# Patient Record
Sex: MALE | Race: WHITE | NOT HISPANIC OR LATINO | URBAN - METROPOLITAN AREA
[De-identification: names, ages, dates, MRNs, and addresses within clinical notes are randomized per-mention and may not be internally consistent; named-entity substitution may affect disease eponyms.]

---

## 2017-12-11 ENCOUNTER — EMERGENCY (EMERGENCY)
Facility: HOSPITAL | Age: 82
LOS: 0 days | Discharge: HOME | End: 2017-12-11
Admitting: INTERNAL MEDICINE

## 2017-12-11 DIAGNOSIS — I10 ESSENTIAL (PRIMARY) HYPERTENSION: ICD-10-CM

## 2017-12-11 DIAGNOSIS — I48.91 UNSPECIFIED ATRIAL FIBRILLATION: ICD-10-CM

## 2017-12-11 DIAGNOSIS — L03.90 CELLULITIS, UNSPECIFIED: ICD-10-CM

## 2017-12-11 DIAGNOSIS — F79 UNSPECIFIED INTELLECTUAL DISABILITIES: ICD-10-CM

## 2017-12-11 DIAGNOSIS — E03.9 HYPOTHYROIDISM, UNSPECIFIED: ICD-10-CM

## 2017-12-14 ENCOUNTER — EMERGENCY (EMERGENCY)
Facility: HOSPITAL | Age: 82
LOS: 0 days | Discharge: HOME | End: 2017-12-14
Admitting: INTERNAL MEDICINE

## 2017-12-14 DIAGNOSIS — F79 UNSPECIFIED INTELLECTUAL DISABILITIES: ICD-10-CM
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
## 2017-12-14 DIAGNOSIS — Z79.899 OTHER LONG TERM (CURRENT) DRUG THERAPY: ICD-10-CM
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
## 2017-12-14 DIAGNOSIS — I10 ESSENTIAL (PRIMARY) HYPERTENSION: ICD-10-CM

## 2017-12-14 DIAGNOSIS — Z79.01 LONG TERM (CURRENT) USE OF ANTICOAGULANTS: ICD-10-CM

## 2017-12-14 DIAGNOSIS — E03.9 HYPOTHYROIDISM, UNSPECIFIED: ICD-10-CM

## 2017-12-14 DIAGNOSIS — S62.184A: ICD-10-CM

## 2017-12-14 DIAGNOSIS — I48.91 UNSPECIFIED ATRIAL FIBRILLATION: ICD-10-CM

## 2017-12-14 DIAGNOSIS — L03.90 CELLULITIS, UNSPECIFIED: ICD-10-CM

## 2017-12-14 DIAGNOSIS — M25.531 PAIN IN RIGHT WRIST: ICD-10-CM
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
## 2017-12-14 DIAGNOSIS — Z88.5 ALLERGY STATUS TO NARCOTIC AGENT: ICD-10-CM

## 2017-12-29 PROBLEM — Z00.00 ENCOUNTER FOR PREVENTIVE HEALTH EXAMINATION: Status: ACTIVE | Noted: 2017-12-29

## 2019-12-19 ENCOUNTER — INPATIENT (INPATIENT)
Facility: HOSPITAL | Age: 84
LOS: 11 days | Discharge: NURSING, REHAB OR LONG-TERM CARE | End: 2019-12-31
Attending: HOSPITALIST | Admitting: HOSPITALIST
Payer: MEDICARE

## 2019-12-19 VITALS — HEART RATE: 33 BPM | TEMPERATURE: 98 F | OXYGEN SATURATION: 96 %

## 2019-12-19 LAB
ALBUMIN SERPL ELPH-MCNC: 4.2 G/DL — SIGNIFICANT CHANGE UP (ref 3.5–5.2)
ALP SERPL-CCNC: 102 U/L — SIGNIFICANT CHANGE UP (ref 30–115)
ALT FLD-CCNC: 27 U/L — SIGNIFICANT CHANGE UP (ref 0–41)
ANION GAP SERPL CALC-SCNC: 16 MMOL/L — HIGH (ref 7–14)
APTT BLD: 28.2 SEC — SIGNIFICANT CHANGE UP (ref 27–39.2)
AST SERPL-CCNC: 24 U/L — SIGNIFICANT CHANGE UP (ref 0–41)
BASOPHILS # BLD AUTO: 0.05 K/UL — SIGNIFICANT CHANGE UP (ref 0–0.2)
BASOPHILS NFR BLD AUTO: 0.5 % — SIGNIFICANT CHANGE UP (ref 0–1)
BILIRUB SERPL-MCNC: 0.6 MG/DL — SIGNIFICANT CHANGE UP (ref 0.2–1.2)
BLD GP AB SCN SERPL QL: SIGNIFICANT CHANGE UP
BUN SERPL-MCNC: 22 MG/DL — HIGH (ref 10–20)
CALCIUM SERPL-MCNC: 8.6 MG/DL — SIGNIFICANT CHANGE UP (ref 8.5–10.1)
CHLORIDE SERPL-SCNC: 104 MMOL/L — SIGNIFICANT CHANGE UP (ref 98–110)
CK MB CFR SERPL CALC: 5.8 NG/ML — SIGNIFICANT CHANGE UP (ref 0.6–6.3)
CK SERPL-CCNC: 151 U/L — SIGNIFICANT CHANGE UP (ref 0–225)
CK SERPL-CCNC: 158 U/L — SIGNIFICANT CHANGE UP (ref 0–225)
CO2 SERPL-SCNC: 22 MMOL/L — SIGNIFICANT CHANGE UP (ref 17–32)
CREAT SERPL-MCNC: 1.1 MG/DL — SIGNIFICANT CHANGE UP (ref 0.7–1.5)
EOSINOPHIL # BLD AUTO: 0.37 K/UL — SIGNIFICANT CHANGE UP (ref 0–0.7)
EOSINOPHIL NFR BLD AUTO: 3.7 % — SIGNIFICANT CHANGE UP (ref 0–8)
ETHANOL SERPL-MCNC: <10 MG/DL — SIGNIFICANT CHANGE UP
GLUCOSE BLDC GLUCOMTR-MCNC: 108 MG/DL — HIGH (ref 70–99)
GLUCOSE SERPL-MCNC: 151 MG/DL — HIGH (ref 70–99)
HCT VFR BLD CALC: 39.5 % — LOW (ref 42–52)
HGB BLD-MCNC: 12.9 G/DL — LOW (ref 14–18)
IMM GRANULOCYTES NFR BLD AUTO: 0.6 % — HIGH (ref 0.1–0.3)
INR BLD: 1.23 RATIO — SIGNIFICANT CHANGE UP (ref 0.65–1.3)
LACTATE SERPL-SCNC: 3.2 MMOL/L — HIGH (ref 0.7–2)
LIDOCAIN IGE QN: 16 U/L — SIGNIFICANT CHANGE UP (ref 7–60)
LYMPHOCYTES # BLD AUTO: 3.18 K/UL — SIGNIFICANT CHANGE UP (ref 1.2–3.4)
LYMPHOCYTES # BLD AUTO: 31.8 % — SIGNIFICANT CHANGE UP (ref 20.5–51.1)
MAGNESIUM SERPL-MCNC: 1.9 MG/DL — SIGNIFICANT CHANGE UP (ref 1.8–2.4)
MCHC RBC-ENTMCNC: 28.1 PG — SIGNIFICANT CHANGE UP (ref 27–31)
MCHC RBC-ENTMCNC: 32.7 G/DL — SIGNIFICANT CHANGE UP (ref 32–37)
MCV RBC AUTO: 86.1 FL — SIGNIFICANT CHANGE UP (ref 80–94)
MONOCYTES # BLD AUTO: 0.98 K/UL — HIGH (ref 0.1–0.6)
MONOCYTES NFR BLD AUTO: 9.8 % — HIGH (ref 1.7–9.3)
NEUTROPHILS # BLD AUTO: 5.35 K/UL — SIGNIFICANT CHANGE UP (ref 1.4–6.5)
NEUTROPHILS NFR BLD AUTO: 53.6 % — SIGNIFICANT CHANGE UP (ref 42.2–75.2)
NRBC # BLD: 0 /100 WBCS — SIGNIFICANT CHANGE UP (ref 0–0)
PLATELET # BLD AUTO: 214 K/UL — SIGNIFICANT CHANGE UP (ref 130–400)
POTASSIUM SERPL-MCNC: 3.6 MMOL/L — SIGNIFICANT CHANGE UP (ref 3.5–5)
POTASSIUM SERPL-SCNC: 3.6 MMOL/L — SIGNIFICANT CHANGE UP (ref 3.5–5)
PROT SERPL-MCNC: 6.9 G/DL — SIGNIFICANT CHANGE UP (ref 6–8)
PROTHROM AB SERPL-ACNC: 14.1 SEC — HIGH (ref 9.95–12.87)
RBC # BLD: 4.59 M/UL — LOW (ref 4.7–6.1)
RBC # FLD: 13.9 % — SIGNIFICANT CHANGE UP (ref 11.5–14.5)
SODIUM SERPL-SCNC: 142 MMOL/L — SIGNIFICANT CHANGE UP (ref 135–146)
TROPONIN T SERPL-MCNC: 0.02 NG/ML — HIGH
TROPONIN T SERPL-MCNC: 0.03 NG/ML — CRITICAL HIGH
WBC # BLD: 9.99 K/UL — SIGNIFICANT CHANGE UP (ref 4.8–10.8)
WBC # FLD AUTO: 9.99 K/UL — SIGNIFICANT CHANGE UP (ref 4.8–10.8)

## 2019-12-19 PROCEDURE — 12013 RPR F/E/E/N/L/M 2.6-5.0 CM: CPT | Mod: 59

## 2019-12-19 PROCEDURE — 93010 ELECTROCARDIOGRAM REPORT: CPT

## 2019-12-19 PROCEDURE — 70486 CT MAXILLOFACIAL W/O DYE: CPT | Mod: 26

## 2019-12-19 PROCEDURE — 72125 CT NECK SPINE W/O DYE: CPT | Mod: 26

## 2019-12-19 PROCEDURE — 71045 X-RAY EXAM CHEST 1 VIEW: CPT | Mod: 26

## 2019-12-19 PROCEDURE — 99291 CRITICAL CARE FIRST HOUR: CPT | Mod: 25

## 2019-12-19 PROCEDURE — 74177 CT ABD & PELVIS W/CONTRAST: CPT | Mod: 26

## 2019-12-19 PROCEDURE — 99221 1ST HOSP IP/OBS SF/LOW 40: CPT

## 2019-12-19 PROCEDURE — 72170 X-RAY EXAM OF PELVIS: CPT | Mod: 26

## 2019-12-19 PROCEDURE — 71260 CT THORAX DX C+: CPT | Mod: 26

## 2019-12-19 PROCEDURE — 36556 INSERT NON-TUNNEL CV CATH: CPT

## 2019-12-19 PROCEDURE — 99222 1ST HOSP IP/OBS MODERATE 55: CPT

## 2019-12-19 PROCEDURE — 70450 CT HEAD/BRAIN W/O DYE: CPT | Mod: 26

## 2019-12-19 RX ORDER — INFLUENZA VIRUS VACCINE 15; 15; 15; 15 UG/.5ML; UG/.5ML; UG/.5ML; UG/.5ML
0.5 SUSPENSION INTRAMUSCULAR ONCE
Refills: 0 | Status: DISCONTINUED | OUTPATIENT
Start: 2019-12-19 | End: 2019-12-31

## 2019-12-19 RX ORDER — PANTOPRAZOLE SODIUM 20 MG/1
40 TABLET, DELAYED RELEASE ORAL
Refills: 0 | Status: DISCONTINUED | OUTPATIENT
Start: 2019-12-19 | End: 2019-12-20

## 2019-12-19 RX ORDER — AMLODIPINE BESYLATE 2.5 MG/1
10 TABLET ORAL DAILY
Refills: 0 | Status: DISCONTINUED | OUTPATIENT
Start: 2019-12-19 | End: 2019-12-20

## 2019-12-19 RX ORDER — MORPHINE SULFATE 50 MG/1
4 CAPSULE, EXTENDED RELEASE ORAL ONCE
Refills: 0 | Status: DISCONTINUED | OUTPATIENT
Start: 2019-12-19 | End: 2019-12-19

## 2019-12-19 RX ORDER — CEFAZOLIN SODIUM 1 G
2000 VIAL (EA) INJECTION ONCE
Refills: 0 | Status: COMPLETED | OUTPATIENT
Start: 2019-12-19 | End: 2019-12-19

## 2019-12-19 RX ORDER — ACETAMINOPHEN 500 MG
650 TABLET ORAL EVERY 6 HOURS
Refills: 0 | Status: DISCONTINUED | OUTPATIENT
Start: 2019-12-19 | End: 2019-12-20

## 2019-12-19 RX ORDER — LEVOTHYROXINE SODIUM 125 MCG
25 TABLET ORAL DAILY
Refills: 0 | Status: DISCONTINUED | OUTPATIENT
Start: 2019-12-19 | End: 2019-12-20

## 2019-12-19 RX ORDER — CHLORHEXIDINE GLUCONATE 213 G/1000ML
1 SOLUTION TOPICAL
Refills: 0 | Status: DISCONTINUED | OUTPATIENT
Start: 2019-12-19 | End: 2019-12-20

## 2019-12-19 RX ADMIN — MORPHINE SULFATE 4 MILLIGRAM(S): 50 CAPSULE, EXTENDED RELEASE ORAL at 15:59

## 2019-12-19 RX ADMIN — Medication 100 MILLIGRAM(S): at 17:24

## 2019-12-19 NOTE — ED PROVIDER NOTE - PROGRESS NOTE DETAILS
BI: pt cleared from trauma perspective for CCU. Primary Dr. Menard. BI: Per cardio fellow, Dr. Frank, who was at bedside initially, admit to CCU and have EP see pt for PPM. Place EP consult. Signed out to [facility] ICU resident.

## 2019-12-19 NOTE — ED PROCEDURE NOTE - NS ED PROCEDURE ASSISTED BY
The procedure was performed independently
Supervision was available

## 2019-12-19 NOTE — H&P ADULT - NSHPPHYSICALEXAM_GEN_ALL_CORE
Vital Signs Last 24 Hrs  T(C): 36.4 (19 Dec 2019 13:46), Max: 36.4 (19 Dec 2019 13:46)  T(F): 97.6 (19 Dec 2019 13:46), Max: 97.6 (19 Dec 2019 13:46)  HR: 59 (19 Dec 2019 15:55) (26 - 100)  BP: 160/68 (19 Dec 2019 15:55) (120/60 - 169/84)  BP(mean): --  RR: 20 (19 Dec 2019 15:55) (18 - 20)  SpO2: 97% (19 Dec 2019 15:55) (96% - 100%)    POCT Blood Glucose.: 128 mg/dL (19 Dec 2019 14:10)    Physical Exam:    -     General :     -      HEENT:    -      Cardiac:    -      Pulm:    -      GI:    -      Musculoskeletal:    -      Neuro: Vital Signs Last 24 Hrs  T(C): 36.4 (19 Dec 2019 13:46), Max: 36.4 (19 Dec 2019 13:46)  T(F): 97.6 (19 Dec 2019 13:46), Max: 97.6 (19 Dec 2019 13:46)  HR: 59 (19 Dec 2019 15:55) (26 - 100)  BP: 160/68 (19 Dec 2019 15:55) (120/60 - 169/84)  BP(mean): --  RR: 20 (19 Dec 2019 15:55) (18 - 20)  SpO2: 97% (19 Dec 2019 15:55) (96% - 100%)    POCT Blood Glucose.: 128 mg/dL (19 Dec 2019 14:10)    Physical Exam:    -     General : alert, awake and in no acute distress    -      HEENT: depression over nasal bridge    -      Cardiac: RRR    -      Pulm: CTA B/L    -      GI: abdomen soft, non-tender    -      Musculoskeletal: no lower extremity edema    -      Neuro: no focal deficits

## 2019-12-19 NOTE — CONSULT NOTE ADULT - ASSESSMENT
83 YO M with PMH of HTN, hypothyroidism, h/o DVT, ?Paroxysmal A.Fib on Eliquis, hypercoagulable state, intellectual disability, brought in by EMS after an episode of syncope.    Intermittent Complete heart block  Paroxysmal A.Fib/Hypercoagulable state    -s/p transvenous PM placement in the ER  -currently back in NSR with intermittent RV pacing  -avoid all AV rajat blocking drugs  -obtain 2d echo, hold Eliquis  -NPO past midnight for possible PPM implantation tomorrow  -monitor in CCU  -will discuss with Attending 83 YO M with PMH of HTN, hypothyroidism, h/o DVT, ?Paroxysmal A.Fib on Eliquis, hypercoagulable state, intellectual disability, brought in by EMS after an episode of syncope.    Intermittent Complete heart block  Paroxysmal A.Fib/Hypercoagulable state    -s/p transvenous PM placement in the ER  -currently back in NSR with intermittent RV pacing  -avoid all AV rajat blocking drugs  -obtain 2d echo, hold Eliquis   PPM DDD scheduled for today  -monitor in CCU till  pt gets PPM  then transfer to telemetry  -

## 2019-12-19 NOTE — H&P ADULT - ASSESSMENT
A 84-year-old male with a PMH of HTN, DVT/atrial fibrillation? (on Eliquis), hypothyroidism, and intellectual disability who presented after a syncopal event today.    1. Complete heart block  - s/p TVP  - cardiology and EP consults pending  - check TSH and repeat cardiac enzymes  - keep NPO after midnight for possible procedure in AM    2. HTN  - continue Amlodipine    3. Hypothyroidism  - TSH from 9/2019 was 3  - check repeat TSH level  - continue Synthroid 25 mcg daily    4. History of DVT/A-Fib?  - unclear why patient is on Eliquis; please f/u with patient's outpatient PMD for further history  - will hold Eliquis for now in anticipation of possible PPM procedure    5. Right 7th rib fracture  - pain control as tolerated  - encourage use of incentive spirometry    6. Nasal bone fracture  - pain control as tolerated    7. Disposition  - admit to CCU A 84-year-old male with a PMH of HTN, DVT/atrial fibrillation? (on Eliquis), hypothyroidism, and intellectual disability who presented after a syncopal event today.    1. Complete heart block  - s/p TVP  - cardiology and EP consults pending  - check TSH and repeat cardiac enzymes  - case discussed with cardiology fellow; keep NPO after midnight for possible procedure in AM    2. HTN  - continue Amlodipine 10 mg daily    3. Hypothyroidism  - TSH from 9/2019 was 3  - check repeat TSH level  - continue Synthroid 25 mcg daily    4. History of DVT/A-Fib?  - unclear why patient is on Eliquis; please f/u with patient's outpatient PMD for further history  - will hold Eliquis for now in anticipation of possible PPM procedure    5. Right 7th rib fracture  - pain control as tolerated  - encourage use of incentive spirometry    6. Nasal bone fracture  - pain control as tolerated    7. DVT prophylaxis  - bilateral SCDs for now    8. Disposition  - admit to CCU

## 2019-12-19 NOTE — ED PROVIDER NOTE - CLINICAL SUMMARY MEDICAL DECISION MAKING FREE TEXT BOX
72yo M presents after syncope, found to be in 3rd degree heart block. S/p TVP placement in ED. Traumatic workup reveals nasal bone fx, overlying laceration, covered with ABx. Will require EP eval, PPM placement. Case d/w cardiology fellow, accepted to CCU.

## 2019-12-19 NOTE — ED PROVIDER NOTE - CRITICAL CARE PROVIDED
direct patient care (not related to procedure)/interpretation of diagnostic studies/consult w/ pt's family directly relating to pts condition/documentation/consultation with other physicians/additional history taking

## 2019-12-19 NOTE — ED ADULT NURSE NOTE - OBJECTIVE STATEMENT
Pt BIBA for witnessed syncopal episode at a [facility]. Pt has lac to bridge of nose and abrasion to right knee. Trauma alert called. Pt HR 33 upon arrival. Transvenous pacer in progress.

## 2019-12-19 NOTE — H&P ADULT - HISTORY OF PRESENT ILLNESS
The patient is a 84-year-old male The patient is a 84-year-old male with a PMH of HTN, DVT/atrial fibrillation? (on Eliquis), hypothyroidism, and intellectual disability who presented after a syncopal event today.  The patient is a poor historian so further history was obtained from his sister at bedside.  The patient was reportedly walking to the store when he lost consciousness.  He is unable to provide further details about his syncopal event.    In the ED, the patient was found to be bradycardic and with complete heart block on ECG.  A transvenous pacemaker was placed and his HR subsequently improved.

## 2019-12-19 NOTE — CONSULT NOTE ADULT - SUBJECTIVE AND OBJECTIVE BOX
TRAUMA ACTIVATION LEVEL:  TRAUMA ALERT    MECHANISM OF INJURY:      [] Blunt  	[] MVC	[X] Fall	[] Pedestrian Struck	[] Motorcycle   [] Assault   [] Bicycle collision  [] Sports injury     [] Penetrating  	[] Gun Shot Wound 		[] Stab Wound    GCS: 15 	E: 4	V: 5	M: 6    HPI:  71M, unknown PMHx, presents to ED s/p fall. Pt states he fainted, and fell face forward. Pt states he is on Eliquis. Pt presents w/ nasal bridge laceration, and dried blood on b/l hands. Otherwise, no HA, no CP, no SOB, no abdominal pain, no musculoskeletal pain.     PAST MEDICAL & SURGICAL HISTORY:      Allergies    No Known Allergies    Intolerances        Home Medications:  Eliquis     ROS: 10-system review is otherwise negative except HPI above.      Primary Survey:    A - airway intact  B - bilateral breath sounds and good chest rise  C - palpable pulses in all extremities  D - GCS 15 on arrival, BUCIO  Exposure obtained    Vital Signs Last 24 Hrs  T(C): 36.4 (19 Dec 2019 13:46), Max: 36.4 (19 Dec 2019 13:46)  T(F): 97.6 (19 Dec 2019 13:46), Max: 97.6 (19 Dec 2019 13:46)  HR: 26 (19 Dec 2019 14:22) (26 - 33)  BP: 120/60 (19 Dec 2019 14:12) (120/60 - 120/60)  BP(mean): --  RR: 20 (19 Dec 2019 14:22) (20 - 20)  SpO2: 100% (19 Dec 2019 14:22) (96% - 100%)    Secondary Survey:   General: NAD  HEENT: Nasal bridge laceration. EOMI, PEERLA. no scalp lacerations   Neck: Soft, midline trachea. no cspine tenderness  Chest: No chest wall tenderness. or subq  emphysema   Cardiac: S1, S2, RRR  Respiratory: Bilateral breath sounds, clear and equal bilaterally  Abdomen: Soft, non-distended, non-tender, no rebound,   Groin: Normal appearing, pelvis stable   Ext: palp radial b/l UE, b/l DP palp in Lower Extrem.   Back: no TTP, no palpable runoff/stepoff/deformity      FAST    Procedures:    LABS:  Labs:  CAPILLARY BLOOD GLUCOSE      POCT Blood Glucose.: 128 mg/dL (19 Dec 2019 14:10)              LFTs:         Coags:          RADIOLOGY & ADDITIONAL STUDIES:    PENDING     --------------------------------------------------------------------------------------- TRAUMA ACTIVATION LEVEL:  TRAUMA ALERT    MECHANISM OF INJURY:      [] Blunt  	[] MVC	[X] Fall	[] Pedestrian Struck	[] Motorcycle   [] Assault   [] Bicycle collision  [] Sports injury     [] Penetrating  	[] Gun Shot Wound 		[] Stab Wound    GCS: 15 	E: 4	V: 5	M: 6    HPI:  71M, unknown PMHx, presents to ED s/p fall on eliquis. Pt states he fainted, and fell face forward. Pt states he is on Eliquis. Pt presents w/ nasal bridge laceration, and dried blood on b/l hands. Otherwise, no HA, no CP, no SOB, no abdominal pain, no musculoskeletal pain. While in ED patient was profoundly bradycardic - pacer wires placed by ED at bedside    PAST MEDICAL & SURGICAL HISTORY:  Unknown - states "ask my sister when she arrives"    Allergies    No Known Allergies    Intolerances        Home Medications:  Eliquis     ROS: 10-system review is otherwise negative except HPI above.      Primary Survey:    A - airway intact  B - bilateral breath sounds and good chest rise  C - palpable pulses in all extremities  D - GCS 15 on arrival, BUCIO  Exposure obtained    Vital Signs Last 24 Hrs  T(C): 36.4 (19 Dec 2019 13:46), Max: 36.4 (19 Dec 2019 13:46)  T(F): 97.6 (19 Dec 2019 13:46), Max: 97.6 (19 Dec 2019 13:46)  HR: 26 (19 Dec 2019 14:22) (26 - 33)  BP: 120/60 (19 Dec 2019 14:12) (120/60 - 120/60)  BP(mean): --  RR: 20 (19 Dec 2019 14:22) (20 - 20)  SpO2: 100% (19 Dec 2019 14:22) (96% - 100%)    Secondary Survey:   General: NAD  HEENT: Nasal bridge laceration. EOMI, PEERLA. no scalp lacerations   Neck: Soft, midline trachea. no cspine tenderness  Chest: No chest wall tenderness. or subq  emphysema   Cardiac: S1, S2, RRR  Respiratory: Bilateral breath sounds, clear and equal bilaterally  Abdomen: Soft, non-distended, non-tender, no rebound,   Groin: Normal appearing, pelvis stable   Ext: palp radial b/l UE, b/l DP palp in Lower Extrem.   Back: no TTP, no palpable runoff/stepoff/deformity      FAST    Procedures:    LABS:             12.9   9.99  )-----------( 214      ( 12-19 @ 14:32 )             39.5                    142   |  104   |  22                 Ca: 8.6    BMP:   ----------------------------< 151    Mg: x     (12-19-19 @ 14:32)             3.6    |  22    | 1.1                Ph: x        LFT:     TPro: 6.9 / Alb: 4.2 / TBili: 0.6 / DBili: x / AST: 24 / ALT: 27 / AlkPhos: 102   (12-19-19 @ 14:32)          PT/INR - ( 19 Dec 2019 14:32 )   PT: 14.10 sec;   INR: 1.23 ratio         PTT - ( 19 Dec 2019 14:32 )  PTT:28.2 sec    CARDIAC MARKERS ( 19 Dec 2019 14:32 )  x     / 0.02 ng/mL / 158 U/L / x     / x          Radiology:  < from: CT Head No Cont (12.19.19 @ 15:40) >  Fracture of the nasal bones.    No evidence of acute maxillo-facial, orbital or mandibular fracture.    Parenchymal atrophy compatible with the patient's age  No evidence of acute intracranial hemorrhage or mass effect.    < end of copied text >      < from: CT Maxillofacial No Cont (12.19.19 @ 15:46) >  Fracture of the nasal bones.    No evidence of acute maxillo-facial, orbital or mandibular fracture.    Parenchymal atrophy compatible with the patient's age  No evidence of acute intracranial hemorrhage or mass effect.      < end of copied text >      < from: CT Cervical Spine No Cont (12.19.19 @ 15:44) >  Degenerative changes as described above. No evidence of fracture.    < end of copied text >      < from: CT Chest w/ IV Cont (12.19.19 @ 15:54) >    Minimally displaced fracture of the anterior right seventh rib, age indeterminate.    No evidence of solid injury in the thorax, abdomen or pelvis.    Right lower lobe atelectasis. However, underlying nodule cannot be excluded. Recommend follow-up CT in 3 months to document resolution.    < end of copied text >

## 2019-12-19 NOTE — CONSULT NOTE ADULT - ASSESSMENT
83 YO M with PMH of HTN, hypothyroidism, h/o DVT, ?Paroxysmal A.Fib on Eliquis, hypercoagulable state, intellectual disability, brought in by EMS after an episode of syncope.    Intermittent Complete heart block  Paroxysmal A.Fib/Hypercoagulable state    -s/p transvenous PM placement in the ER  -currently back in NSR with intermittent RV pacing  -avoid all AV rajat blocking drugs  -obtain 2d echo, hold Eliquis  -NPO past midnight for possible PPM implantation tomorrow  -monitor in CCU  -will discuss with Attending 83 YO M with PMH of HTN, hypothyroidism, h/o DVT, ?Paroxysmal A.Fib on Eliquis, hypercoagulable state, intellectual disability, brought in by EMS after an episode of syncope.    Intermittent Complete heart block  Paroxysmal A.Fib/Hypercoagulable state    -s/p transvenous PM placement in the ER  -currently back in NSR with intermittent RV pacing  -avoid all AV rajat blocking drugs, check tsh  -obtain 2d echo, hold Eliquis  -NPO past midnight for possible PPM implantation tomorrow  -monitor in CCU  -will discuss with Attending

## 2019-12-19 NOTE — CONSULT NOTE ADULT - ASSESSMENT
ASSESSMENT/PLAN:   71M, s/p syncope, +HT, +LOC, +Eliquis, nasal bridge laceration.   - Full PAN scan w/ Max/fac   - full set of labs   - reassess ASSESSMENT/PLAN:   71M, s/p syncope, +HT, +LOC, +Eliquis, nasal bridge laceration.       - Full PAN scan w/ Max/fac - nasal bone fracture and age indeterminate rib fx (no corresponding chest wall tenderness   - full set of labs with elevation in lacate and mild elevation in trops   - At this time medical issues outweigh burden of traumatic injury (bradycardia requiring emergent pacing vs nasal bone fracture), agree with ED plan for admission to medical service for workup    D/w Dr. Fitzgerald

## 2019-12-19 NOTE — ED PROCEDURE NOTE - CPROC ED INFORMED CONSENT1
This was an emergent procedure and consent was implied.
This was an emergent procedure and consent was implied.
Benefits, risks, and possible complications of procedure explained to patient/caregiver who verbalized understanding and gave verbal consent.

## 2019-12-19 NOTE — ED PROVIDER NOTE - OBJECTIVE STATEMENT
71 y.o M w/ unknown PMHx syncopized and fell in front of a deli, found down by pedestrians. Pt was initially altered but can now tell us that he felt dizzy prior to falling. Pt unable to convey any other history.

## 2019-12-19 NOTE — H&P ADULT - ATTENDING COMMENTS
I saw and examined the patient independently. I agree with above history, physical exam and plan of care which I have reviewed and edited where appropriate with following additions.    patient seen in CCU after PPM placement/ reports doing ok/ denies being in pain. asking for nurse help with bedpan for BM.     syncope episode due to complete heart block sp PPM placement:   patient had TVP placed in ED yesterday.   sp PPM placement today  continue telemonitoring in CCU.   TTE noted: normal EF with grade 1 diastolic dysfunction.     HTN:   BP on higher side  avoiding rajat blocking agents.  c/w norvasc.    h/o A-FIB / ?DVT:   on eliquis/ can restart if no further procedures planned .     nasal bone fractures:  denies pain for now  c/w tylenol prn.   ENT eval within 7 days once stable     dvt ppx has SCD's now.

## 2019-12-19 NOTE — ED PROVIDER NOTE - ATTENDING CONTRIBUTION TO CARE
70yo M with PMHx HTN, hypothyroid, on Eliquis for unknown reason, BIBEMS after fall at deli, found down by pedestrians. Pt states lightheaded prior to falling. EMS HR in 20s. Pt is denying pain. Unable to provide any other history/ROS.    Vital Signs: I have reviewed the initial vital signs.  Constitutional: NAD  HEAD: No signs of basilar skull fracture.  Integumentary: Laceration to bridge of nose.   EYES: No periorbial swelling/ecchymoses. PERRL, EOM intact.   ENT: MMM. No septal hematoma. No mastoid ecchymoses.  NECK: Supple, non-tender, no spinous tenderness to neck. No palpable shelves or step-offs.  BACK: No spinous tenderness. No palpable shelves or step-offs.  Cardiovascular: Bradycardic. Radial pulses and pedal pulses 2+ and symmetric. No pain to palpation to chest wall.  Respiratory: BS present b/l, ctabl, no wheezing or crackles, good air exchange, good resp effort and excursion, no accessory muscle use, no stridor.  Gastrointestinal: Soft, nd, nt no rebound tenderness or guarding, no cvat.  Musculoskeletal: No hip pain to palpation. No short leg. No internal or external rotation of LE.  Neurologic: AAOx2. GCS 15. Speech clear and coherent. Answering questions appropriately. Moving all extremities. No gross FND.

## 2019-12-19 NOTE — ED PROVIDER NOTE - PHYSICAL EXAMINATION
CONSTITUTIONAL: in NAD, GCS 15  SKIN: Warm dry, normal skin turgor, lac to bridge of nose.  HEAD: NC  EYES: EOMI, PERRLA, no scleral icterus, conjunctiva pink  ENT: no epistaxis or blood in the oropharynx  NECK: Supple; non tender. Full ROM. trachea midline.  CARD: leticia, no murmurs.  THORAX: no chest wall tenderness.  RESP: b/l breath sounds CTABL  ABD: soft, non-tender, non-distended, no rebound or guarding.  EXT: Full ROM, no bony tenderness, no pedal edema, no calf tenderness, stable pelvis, no spinal back tenderness. peripheral pulses intact and symmetrical.  NEURO: moving all extremities equally.   PSYCH: Cooperative, appropriate. A&O x2.

## 2019-12-19 NOTE — CONSULT NOTE ADULT - SUBJECTIVE AND OBJECTIVE BOX
Date of Admission: 12/19    CHIEF COMPLAINT: syncope    HISTORY OF PRESENT ILLNESS:   83 YO M with PMH of HTN, hypothyroidism, h/o DVT, ?Paroxysmal A.Fib on Eliquis, hypercoagulable state, intellectual disability, brought in by EMS after an episode of syncope. History was provided by the niece as well, who was at the bedside. Pt. as per the family was walking back from the grocery store when he syncopized.     EKG upon arrival in the ER showed complete heart block, with ventricular escape rhythm. TVP was placed by ER. Of note pt. had one prior episode of syncope about 1 year ago.    Pt. denies any chest pain, sob, palpitations, orthopnea, pnd.    PAST MEDICAL & SURGICAL HISTORY:  Hypothyroidism  Hypertension  A.Fib  No significant past surgical history      FAMILY HISTORY:  [x] no pertinent family history of premature cardiovascular disease in first degree relatives.  Mother:   Father:   Siblings:     SOCIAL HISTORY:    [x] Non-smoker  [ ] Smoker  [-] Alcohol    Allergies    No Known Allergies    Intolerances    	    REVIEW OF SYSTEMS:  CONSTITUTIONAL: No fever, weight loss, or fatigue  CARDIOLOGY: denies chest pain, shortness of breath   RESPIRATORY: denies shortness of breath, wheezing   NEUROLOGICAL: No weakness, no focal deficits to report.  ENDOCRINOLOGICAL: no recent change in diabetic medications.   GI: no BRBPR, no N,V,diarrhea.    PSYCHIATRY: normal mood and affect  HEENT: no nasal discharge, no ecchymosis  SKIN: no ecchymosis, no breakdown  MUSCULOSKELETAL: Full range of motion x4.     PHYSICAL EXAM:  T(C): 36.4 (12-19-19 @ 18:10), Max: 36.4 (12-19-19 @ 13:46)  HR: 74 (12-19-19 @ 18:10) (26 - 100)  BP: 141/73 (12-19-19 @ 18:10) (120/60 - 169/84)  RR: 16 (12-19-19 @ 18:10) (16 - 20)  SpO2: 97% (12-19-19 @ 18:10) (96% - 100%)  Wt(kg): --  I&O's Summary      General Appearance: well appearing, normal for age and gender. 	  Neck: normal JVP, no bruit.   Eyes: No xanthomalasia, Extra ocular muscles intact.   Cardiovascular: regular rate and rhythm S1 S2, No JVD, No murmurs, No edema  Respiratory: Lungs clear to auscultation	  Psychiatry: Alert and oriented x 3, Mood & affect appropriate  Gastrointestinal:  Soft, Non-tender  Skin/Integument: No rashes, No ecchymoses, No cyanosis. Laceration over the bridge of the nose  Neurologic: Non-focal  Musculoskeletal/ extremities: Normal range of motion, No clubbing, cyanosis or edema  Vascular: Peripheral pulses palpable 2+ bilaterally    LABS:	 	                          12.9   9.99  )-----------( 214      ( 19 Dec 2019 14:32 )             39.5     12-19    142  |  104  |  22<H>  ----------------------------<  151<H>  3.6   |  22  |  1.1    Ca    8.6      19 Dec 2019 14:32    TPro  6.9  /  Alb  4.2  /  TBili  0.6  /  DBili  x   /  AST  24  /  ALT  27  /  AlkPhos  102  12-19    CARDIAC MARKERS ( 19 Dec 2019 14:32 )  x     / 0.02 ng/mL / 158 U/L / x     / x          PT/INR - ( 19 Dec 2019 14:32 )   PT: 14.10 sec;   INR: 1.23 ratio         PTT - ( 19 Dec 2019 14:32 )  PTT:28.2 sec      TELEMETRY EVENTS: 	    none    ECG:  	  complete heart block with ventricular escape rhythm    RADIOLOGY:  CXR: no radiographic evidence of acute cardiopulmonary disease      PREVIOUS DIAGNOSTIC TESTING:    [ ] Echocardiogram:    [ ]  Catheterization:    [ ] Stress Test:  	  	    Home Medications:  amLODIPine 10 mg oral tablet: 1 tab(s) orally once a day (19 Dec 2019 18:44)  Eliquis:  (19 Dec 2019 18:44)  levothyroxine 25 mcg (0.025 mg) oral tablet: 1 tab(s) orally once a day (19 Dec 2019 18:44)    MEDICATIONS  (STANDING):  amLODIPine   Tablet 10 milliGRAM(s) Oral daily  chlorhexidine 4% Liquid 1 Application(s) Topical <User Schedule>  levothyroxine 25 MICROGram(s) Oral daily  pantoprazole    Tablet 40 milliGRAM(s) Oral before breakfast    MEDICATIONS  (PRN):  acetaminophen   Tablet .. 650 milliGRAM(s) Oral every 6 hours PRN Mild Pain (1 - 3)

## 2019-12-19 NOTE — H&P ADULT - NSHPLABSRESULTS_GEN_ALL_CORE
Labs:                        12.9   9.99  )-----------( 214      ( 19 Dec 2019 14:32 )             39.5             12-19    142  |  104  |  22<H>  ----------------------------<  151<H>  3.6   |  22  |  1.1    Ca    8.6      19 Dec 2019 14:32    TPro  6.9  /  Alb  4.2  /  TBili  0.6  /  DBili  x   /  AST  24  /  ALT  27  /  AlkPhos  102  12-19    LIVER FUNCTIONS - ( 19 Dec 2019 14:32 )  Alb: 4.2 g/dL / Pro: 6.9 g/dL / ALK PHOS: 102 U/L / ALT: 27 U/L / AST: 24 U/L / GGT: x         PT/INR - ( 19 Dec 2019 14:32 )   PT: 14.10 sec;   INR: 1.23 ratio     PTT - ( 19 Dec 2019 14:32 )  PTT:28.2 sec  CARDIAC MARKERS ( 19 Dec 2019 14:32 )  x     / 0.02 ng/mL / 158 U/L / x     / x Labs:                        12.9   9.99  )-----------( 214      ( 19 Dec 2019 14:32 )             39.5             12-19    142  |  104  |  22<H>  ----------------------------<  151<H>  3.6   |  22  |  1.1    Ca    8.6      19 Dec 2019 14:32    TPro  6.9  /  Alb  4.2  /  TBili  0.6  /  DBili  x   /  AST  24  /  ALT  27  /  AlkPhos  102  12-19    LIVER FUNCTIONS - ( 19 Dec 2019 14:32 )  Alb: 4.2 g/dL / Pro: 6.9 g/dL / ALK PHOS: 102 U/L / ALT: 27 U/L / AST: 24 U/L / GGT: x         PT/INR - ( 19 Dec 2019 14:32 )   PT: 14.10 sec;   INR: 1.23 ratio     PTT - ( 19 Dec 2019 14:32 )  PTT:28.2 sec  CARDIAC MARKERS ( 19 Dec 2019 14:32 )  x     / 0.02 ng/mL / 158 U/L / x     / x    < from: 12 Lead ECG (12.19.19 @ 13:51) >  Diagnosis Line Sinus rhythm with complete heart block  Non-specific intra-ventricular conduction delay  Abnormal ECG  < end of copied text >    < from: CT Head No Cont (12.19.19 @ 15:40) >  IMPRESSION:  Fracture of the nasal bones.  No evidence of acute maxillo-facial, orbital or mandibular fracture.  Parenchymal atrophy compatible with the patient's age  No evidence of acute intracranial hemorrhage or mass effect.  < end of copied text >

## 2019-12-19 NOTE — ED PROVIDER NOTE - CARE PLAN
Principal Discharge DX:	Complete heart block Principal Discharge DX:	Complete heart block  Secondary Diagnosis:	Nasal fracture  Secondary Diagnosis:	Fall

## 2019-12-19 NOTE — H&P ADULT - NSICDXPASTMEDICALHX_GEN_ALL_CORE_FT
PAST MEDICAL HISTORY:  No pertinent past medical history PAST MEDICAL HISTORY:  Hypertension     Hypothyroidism

## 2019-12-19 NOTE — ED PROCEDURE NOTE - CPROC ED TRANSVE PACE DETAIL1
The vein was accessed using an introducer. A pacing catheter was advanced.  The positive and negative electrodes were connected and demand pacing was initiated.  The rate and milliamp output were set.

## 2019-12-20 LAB
ALBUMIN SERPL ELPH-MCNC: 3.8 G/DL — SIGNIFICANT CHANGE UP (ref 3.5–5.2)
ALP SERPL-CCNC: 94 U/L — SIGNIFICANT CHANGE UP (ref 30–115)
ALT FLD-CCNC: 29 U/L — SIGNIFICANT CHANGE UP (ref 0–41)
ANION GAP SERPL CALC-SCNC: 16 MMOL/L — HIGH (ref 7–14)
AST SERPL-CCNC: 28 U/L — SIGNIFICANT CHANGE UP (ref 0–41)
BASOPHILS # BLD AUTO: 0.03 K/UL — SIGNIFICANT CHANGE UP (ref 0–0.2)
BASOPHILS NFR BLD AUTO: 0.4 % — SIGNIFICANT CHANGE UP (ref 0–1)
BILIRUB SERPL-MCNC: 0.7 MG/DL — SIGNIFICANT CHANGE UP (ref 0.2–1.2)
BUN SERPL-MCNC: 17 MG/DL — SIGNIFICANT CHANGE UP (ref 10–20)
CALCIUM SERPL-MCNC: 8.7 MG/DL — SIGNIFICANT CHANGE UP (ref 8.5–10.1)
CHLORIDE SERPL-SCNC: 103 MMOL/L — SIGNIFICANT CHANGE UP (ref 98–110)
CO2 SERPL-SCNC: 21 MMOL/L — SIGNIFICANT CHANGE UP (ref 17–32)
CREAT SERPL-MCNC: 0.8 MG/DL — SIGNIFICANT CHANGE UP (ref 0.7–1.5)
EOSINOPHIL # BLD AUTO: 0.26 K/UL — SIGNIFICANT CHANGE UP (ref 0–0.7)
EOSINOPHIL NFR BLD AUTO: 3.4 % — SIGNIFICANT CHANGE UP (ref 0–8)
GLUCOSE SERPL-MCNC: 111 MG/DL — HIGH (ref 70–99)
HCT VFR BLD CALC: 36.9 % — LOW (ref 42–52)
HGB BLD-MCNC: 12.1 G/DL — LOW (ref 14–18)
IMM GRANULOCYTES NFR BLD AUTO: 0.7 % — HIGH (ref 0.1–0.3)
LACTATE SERPL-SCNC: 1 MMOL/L — SIGNIFICANT CHANGE UP (ref 0.7–2)
LYMPHOCYTES # BLD AUTO: 1.45 K/UL — SIGNIFICANT CHANGE UP (ref 1.2–3.4)
LYMPHOCYTES # BLD AUTO: 19.1 % — LOW (ref 20.5–51.1)
MAGNESIUM SERPL-MCNC: 1.8 MG/DL — SIGNIFICANT CHANGE UP (ref 1.8–2.4)
MCHC RBC-ENTMCNC: 28 PG — SIGNIFICANT CHANGE UP (ref 27–31)
MCHC RBC-ENTMCNC: 32.8 G/DL — SIGNIFICANT CHANGE UP (ref 32–37)
MCV RBC AUTO: 85.4 FL — SIGNIFICANT CHANGE UP (ref 80–94)
MONOCYTES # BLD AUTO: 0.77 K/UL — HIGH (ref 0.1–0.6)
MONOCYTES NFR BLD AUTO: 10.1 % — HIGH (ref 1.7–9.3)
NEUTROPHILS # BLD AUTO: 5.04 K/UL — SIGNIFICANT CHANGE UP (ref 1.4–6.5)
NEUTROPHILS NFR BLD AUTO: 66.3 % — SIGNIFICANT CHANGE UP (ref 42.2–75.2)
NRBC # BLD: 0 /100 WBCS — SIGNIFICANT CHANGE UP (ref 0–0)
PLATELET # BLD AUTO: 168 K/UL — SIGNIFICANT CHANGE UP (ref 130–400)
POTASSIUM SERPL-MCNC: 4.2 MMOL/L — SIGNIFICANT CHANGE UP (ref 3.5–5)
POTASSIUM SERPL-SCNC: 4.2 MMOL/L — SIGNIFICANT CHANGE UP (ref 3.5–5)
PROT SERPL-MCNC: 6.6 G/DL — SIGNIFICANT CHANGE UP (ref 6–8)
RBC # BLD: 4.32 M/UL — LOW (ref 4.7–6.1)
RBC # FLD: 13.8 % — SIGNIFICANT CHANGE UP (ref 11.5–14.5)
SODIUM SERPL-SCNC: 140 MMOL/L — SIGNIFICANT CHANGE UP (ref 135–146)
TSH SERPL-MCNC: 2.4 UIU/ML — SIGNIFICANT CHANGE UP (ref 0.27–4.2)
WBC # BLD: 7.6 K/UL — SIGNIFICANT CHANGE UP (ref 4.8–10.8)
WBC # FLD AUTO: 7.6 K/UL — SIGNIFICANT CHANGE UP (ref 4.8–10.8)

## 2019-12-20 PROCEDURE — 93306 TTE W/DOPPLER COMPLETE: CPT | Mod: 26

## 2019-12-20 PROCEDURE — 71045 X-RAY EXAM CHEST 1 VIEW: CPT | Mod: 26

## 2019-12-20 PROCEDURE — 93289 INTERROG DEVICE EVAL HEART: CPT | Mod: 26

## 2019-12-20 PROCEDURE — 93010 ELECTROCARDIOGRAM REPORT: CPT

## 2019-12-20 PROCEDURE — 99222 1ST HOSP IP/OBS MODERATE 55: CPT

## 2019-12-20 PROCEDURE — 99232 SBSQ HOSP IP/OBS MODERATE 35: CPT | Mod: 25

## 2019-12-20 PROCEDURE — 99233 SBSQ HOSP IP/OBS HIGH 50: CPT

## 2019-12-20 PROCEDURE — 99223 1ST HOSP IP/OBS HIGH 75: CPT

## 2019-12-20 PROCEDURE — 33208 INSRT HEART PM ATRIAL & VENT: CPT | Mod: KX

## 2019-12-20 RX ORDER — HEPARIN SODIUM 5000 [USP'U]/ML
5000 INJECTION INTRAVENOUS; SUBCUTANEOUS EVERY 8 HOURS
Refills: 0 | Status: DISCONTINUED | OUTPATIENT
Start: 2019-12-21 | End: 2019-12-22

## 2019-12-20 RX ORDER — PANTOPRAZOLE SODIUM 20 MG/1
40 TABLET, DELAYED RELEASE ORAL
Refills: 0 | Status: DISCONTINUED | OUTPATIENT
Start: 2019-12-20 | End: 2019-12-31

## 2019-12-20 RX ORDER — CHLORHEXIDINE GLUCONATE 213 G/1000ML
1 SOLUTION TOPICAL
Refills: 0 | Status: DISCONTINUED | OUTPATIENT
Start: 2019-12-20 | End: 2019-12-31

## 2019-12-20 RX ORDER — CEFAZOLIN SODIUM 1 G
1000 VIAL (EA) INJECTION EVERY 8 HOURS
Refills: 0 | Status: COMPLETED | OUTPATIENT
Start: 2019-12-20 | End: 2019-12-21

## 2019-12-20 RX ORDER — AMLODIPINE BESYLATE 2.5 MG/1
10 TABLET ORAL DAILY
Refills: 0 | Status: DISCONTINUED | OUTPATIENT
Start: 2019-12-20 | End: 2019-12-31

## 2019-12-20 RX ORDER — ACETAMINOPHEN 500 MG
650 TABLET ORAL EVERY 6 HOURS
Refills: 0 | Status: DISCONTINUED | OUTPATIENT
Start: 2019-12-20 | End: 2019-12-29

## 2019-12-20 RX ORDER — LEVOTHYROXINE SODIUM 125 MCG
25 TABLET ORAL DAILY
Refills: 0 | Status: DISCONTINUED | OUTPATIENT
Start: 2019-12-20 | End: 2019-12-31

## 2019-12-20 RX ADMIN — Medication 25 MICROGRAM(S): at 05:15

## 2019-12-20 RX ADMIN — AMLODIPINE BESYLATE 10 MILLIGRAM(S): 2.5 TABLET ORAL at 15:34

## 2019-12-20 RX ADMIN — Medication 100 MILLIGRAM(S): at 17:05

## 2019-12-20 RX ADMIN — PANTOPRAZOLE SODIUM 40 MILLIGRAM(S): 20 TABLET, DELAYED RELEASE ORAL at 15:34

## 2019-12-20 RX ADMIN — AMLODIPINE BESYLATE 10 MILLIGRAM(S): 2.5 TABLET ORAL at 05:15

## 2019-12-20 RX ADMIN — Medication 25 MICROGRAM(S): at 15:34

## 2019-12-20 RX ADMIN — CHLORHEXIDINE GLUCONATE 1 APPLICATION(S): 213 SOLUTION TOPICAL at 05:15

## 2019-12-20 RX ADMIN — PANTOPRAZOLE SODIUM 40 MILLIGRAM(S): 20 TABLET, DELAYED RELEASE ORAL at 05:15

## 2019-12-20 NOTE — PRE-ANESTHESIA EVALUATION ADULT - NSANTHPMHFT_GEN_ALL_CORE
84 M with PMH of HTN (on amlodipine), paroxysmal Afib (on eliquis), hypercoagulable state (history of DVT), hypothyroidism (on levothyroxine), and intellectual disability, is brought to ED after a syncopal episode.  EKG showed complete heart block, and plan is for pacemaker placement.

## 2019-12-20 NOTE — CONSULT NOTE ADULT - SUBJECTIVE AND OBJECTIVE BOX
Surgeon: /Faizan/ Sally    Consult requesting by: Walter    HISTORY OF PRESENT ILLNESS:  83 YO M with PMH of HTN, hypothyroidism, h/o DVT, ?Paroxysmal A.Fib on Eliquis, hypercoagulable state, intellectual disability, brought in by EMS after an episode of syncope. History was provided by the niece as well, who was at the bedside. Pt. as per the family was walking back from the grocery store when he syncopized.     EKG upon arrival in the ER showed complete heart block, with ventricular escape rhythm. TVP was placed by ER. Of note pt. had one prior episode of syncope about 1 year ago. patient seen by electrophysiologist Dr. Watson who recommeds PPM for symptomatic intermittent heart block. CTS consulted for implant    PAST MEDICAL & SURGICAL HISTORY:  Hypothyroidism  Hypertension  Hypercoagulable state  atrial fibrillation  DVT  No significant past surgical history      MEDICATIONS  (STANDING):  amLODIPine   Tablet 10 milliGRAM(s) Oral daily  chlorhexidine 4% Liquid 1 Application(s) Topical <User Schedule>  influenza   Vaccine 0.5 milliLiter(s) IntraMuscular once  levothyroxine 25 MICROGram(s) Oral daily  pantoprazole    Tablet 40 milliGRAM(s) Oral before breakfast    MEDICATIONS  (PRN):  acetaminophen   Tablet .. 650 milliGRAM(s) Oral every 6 hours PRN Mild Pain (1 - 3)    Antiplatelet therapy:       stopped                    Last dose/amt:    Allergies    No Known Allergies    Intolerances        SOCIAL HISTORY:  no alcohol, drug or tobacco abuse  Occupation: retired  Lives with: family  Assisted device use: none    FAMILY HISTORY: non contributory       Review of Systems  CONSTITUTIONAL: No fever, weight loss, or fatigue  CARDIOLOGY: denies chest pain, shortness of breath   RESPIRATORY: denies shortness of breath, wheezing   NEUROLOGICAL: No weakness, no focal deficits to report.  ENDOCRINOLOGICAL: no recent change in diabetic medications.   GI: no BRBPR, no N,V,diarrhea.    PSYCHIATRY: normal mood and affect  HEENT: no nasal discharge, no ecchymosis  SKIN: no ecchymosis, no breakdown  MUSCULOSKELETAL: Full range of motion x4.                                                                                                                                                                                              PHYSICAL EXAM  Vital Signs Last 24 Hrs  T(C): 35.8 (20 Dec 2019 07:55), Max: 36.7 (19 Dec 2019 20:00)  T(F): 96.5 (20 Dec 2019 07:55), Max: 98 (19 Dec 2019 20:00)  HR: 62 (20 Dec 2019 10:00) (26 - 100)  BP: 121/66 (20 Dec 2019 10:00) (109/58 - 169/84)  BP(mean): 84 (20 Dec 2019 10:00) (74 - 112)  RR: 16 (20 Dec 2019 10:00) (16 - 23)  SpO2: 96% (20 Dec 2019 06:18) (95% - 100%)  Right arm bp: Left arm bp;    General-awake and alert in NAD  HEENT-PERRLA, EOMI, no nasal or ear drainage, oral mucosa moist no lesions, no JVD or goiter or nodes, good ROM  Chest-bilateral breath sounds, decreased bs at bases  Coronary- regular rate and rhythme, no significant murmur appreciated  Abd- soft nontender, nondistended  Extremities: no edema, full ROM,   Neuro: Alert and oriented, sensation and muscle tone intact all extremities                                                            LABS:                        12.1   7.60  )-----------( 168      ( 20 Dec 2019 04:07 )             36.9     12-20    140  |  103  |  17  ----------------------------<  111<H>  4.2   |  21  |  0.8    Ca    8.7      20 Dec 2019 04:07  Mg     1.8     12-20    TPro  6.6  /  Alb  3.8  /  TBili  0.7  /  DBili  x   /  AST  28  /  ALT  29  /  AlkPhos  94  12-20    PT/INR - ( 19 Dec 2019 14:32 )   PT: 14.10 sec;   INR: 1.23 ratio         PTT - ( 19 Dec 2019 14:32 )  PTT:28.2 sec    CARDIAC MARKERS ( 19 Dec 2019 21:09 )  x     / 0.03 ng/mL / 151 U/L / x     / 5.8 ng/mL  CARDIAC MARKERS ( 19 Dec 2019 14:32 )  x     / 0.02 ng/mL / 158 U/L / x     / x         12 Lead ECG (12.19.19 @ 13:51) >  Ventricular Rate 21 BPM    Atrial Rate 98 BPM    QRS Duration 170 ms    Q-T Interval 602 ms    QTC Calculation(Bezet) 355 ms    P Axis 75 degrees    R Axis 85 degrees    T Axis -72 degrees    Diagnosis Line Sinus rhythm with complete heart block  Non-specific intra-ventricular conduction delay          Assessment/ Plan:    83 yo male symptomatic for intermittent complete heart block will schedule for PPM today.  keep NPO Surgeon: /Faizan/ Sally    Consult requesting by: Walter    HISTORY OF PRESENT ILLNESS:  83 YO M with PMH of HTN, hypothyroidism, h/o DVT, ?Paroxysmal A.Fib on Eliquis, hypercoagulable state, intellectual disability, brought in by EMS after an episode of syncope. History was provided by the niece as well, who was at the bedside. Pt. as per the family was walking back from the grocery store when he syncopized.     EKG upon arrival in the ER showed complete heart block, with ventricular escape rhythm. TVP was placed by ER. Of note pt. had one prior episode of syncope about 1 year ago. patient seen by electrophysiologist Dr. Watson who recommeds PPM for symptomatic intermittent heart block. CTS consulted for implant    PAST MEDICAL & SURGICAL HISTORY:  Hypothyroidism  Hypertension  Hypercoagulable state  atrial fibrillation  DVT      MEDICATIONS  (STANDING):  amLODIPine   Tablet 10 milliGRAM(s) Oral daily  chlorhexidine 4% Liquid 1 Application(s) Topical <User Schedule>  influenza   Vaccine 0.5 milliLiter(s) IntraMuscular once  levothyroxine 25 MICROGram(s) Oral daily  pantoprazole    Tablet 40 milliGRAM(s) Oral before breakfast    MEDICATIONS  (PRN):  acetaminophen   Tablet .. 650 milliGRAM(s) Oral every 6 hours PRN Mild Pain (1 - 3)    Antiplatelet therapy:       stopped                    Last dose/amt:    Allergies    No Known Allergies    Intolerances        SOCIAL HISTORY:  no alcohol, drug or tobacco abuse  Occupation: retired  Lives with: family  Assisted device use: none    FAMILY HISTORY: non contributory       Review of Systems  CONSTITUTIONAL: No fever, weight loss, or fatigue  CARDIOLOGY: denies chest pain, shortness of breath   RESPIRATORY: denies shortness of breath, wheezing   NEUROLOGICAL: No weakness, no focal deficits to report.  ENDOCRINOLOGICAL: no recent change in diabetic medications.   GI: no BRBPR, no N,V,diarrhea.    PSYCHIATRY: normal mood and affect  HEENT: no nasal discharge, no ecchymosis  SKIN: no ecchymosis, no breakdown  MUSCULOSKELETAL: Full range of motion x4.                                                                                                                                                                                              PHYSICAL EXAM  Vital Signs Last 24 Hrs  T(C): 35.8 (20 Dec 2019 07:55), Max: 36.7 (19 Dec 2019 20:00)  T(F): 96.5 (20 Dec 2019 07:55), Max: 98 (19 Dec 2019 20:00)  HR: 62 (20 Dec 2019 10:00) (26 - 100)  BP: 121/66 (20 Dec 2019 10:00) (109/58 - 169/84)  BP(mean): 84 (20 Dec 2019 10:00) (74 - 112)  RR: 16 (20 Dec 2019 10:00) (16 - 23)  SpO2: 96% (20 Dec 2019 06:18) (95% - 100%)  Right arm bp: Left arm bp;    General-awake and alert in NAD  HEENT-PERRLA, EOMI, + laceration on bridge of nose with surrounding ecchymosis and tenderness, no JVD or goiter or nodes, good ROM  Chest-bilateral breath sounds, decreased bs at bases  Coronary- regular rate and rhythme, no significant murmur appreciated  Abd- soft nontender, nondistended, + lower midline surgical scar  Extremities: no edema, full ROM,   Neuro: Alert and oriented, sensation and muscle tone intact all extremities                                                          LABS:                        12.1   7.60  )-----------( 168      ( 20 Dec 2019 04:07 )             36.9     12-20    140  |  103  |  17  ----------------------------<  111<H>  4.2   |  21  |  0.8    Ca    8.7      20 Dec 2019 04:07  Mg     1.8     12-20    TPro  6.6  /  Alb  3.8  /  TBili  0.7  /  DBili  x   /  AST  28  /  ALT  29  /  AlkPhos  94  12-20    PT/INR - ( 19 Dec 2019 14:32 )   PT: 14.10 sec;   INR: 1.23 ratio         PTT - ( 19 Dec 2019 14:32 )  PTT:28.2 sec    CARDIAC MARKERS ( 19 Dec 2019 21:09 )  x     / 0.03 ng/mL / 151 U/L / x     / 5.8 ng/mL  CARDIAC MARKERS ( 19 Dec 2019 14:32 )  x     / 0.02 ng/mL / 158 U/L / x     / x         12 Lead ECG (12.19.19 @ 13:51) >  Ventricular Rate 21 BPM    Atrial Rate 98 BPM    QRS Duration 170 ms    Q-T Interval 602 ms    QTC Calculation(Bezet) 355 ms    P Axis 75 degrees    R Axis 85 degrees    T Axis -72 degrees    Diagnosis Line Sinus rhythm with complete heart block  Non-specific intra-ventricular conduction delay          Assessment/ Plan:    85 yo male symptomatic for intermittent complete heart block will schedule for PPM today.  keep NPO

## 2019-12-20 NOTE — CHART NOTE - NSCHARTNOTEFT_GEN_A_CORE
TERTIARY TRAUMA SURVEY  ------------------------------------------------------------------------------------------    Date/Time: 12-20-19 @ 11:15  Admit date:   Trauma activation:   Admit GCS:  	E-  4	V-  5	M-  6    HPI:  The patient is a 84-year-old male with a PMH of HTN, DVT/atrial fibrillation? (on Eliquis), hypothyroidism, and intellectual disability who presented after a syncopal event today.  The patient is a poor historian so further history was obtained from his sister at bedside.  The patient was reportedly walking to the store when he lost consciousness.  He is unable to provide further details about his syncopal event.    In the ED, the patient was found to be bradycardic and with complete heart block on ECG.  A transvenous pacemaker was placed and his HR subsequently improved. (19 Dec 2019 17:53)      INTERVAL EVENTS: ***    PAST MEDICAL & SURGICAL HISTORY:  Hypothyroidism  Hypertension  No significant past surgical history    FAMILY HISTORY:    SOCIAL HISTORY:     ALLERGIES: No Known Allergies      HOME MEDICATIONS:     CURRENT MEDICATIONS:   MEDICATIONS (STANDING): amLODIPine   Tablet 10 milliGRAM(s) Oral daily  influenza   Vaccine 0.5 milliLiter(s) IntraMuscular once  levothyroxine 25 MICROGram(s) Oral daily  pantoprazole    Tablet 40 milliGRAM(s) Oral before breakfast    MEDICATIONS (PRN):acetaminophen   Tablet .. 650 milliGRAM(s) Oral every 6 hours PRN Mild Pain (1 - 3)    ------------------------------------------------------------------------------------------    VITAL SIGNS  T(C): 35.8 (12-20-19 @ 11:01), Max: 36.7 (12-19-19 @ 20:00)  HR: 62 (12-20-19 @ 11:01) (26 - 100)  BP: 121/66 (12-20-19 @ 11:01) (109/58 - 169/84)  RR: 16 (12-20-19 @ 11:01) (16 - 23)  SpO2: 96% (12-20-19 @ 06:18) (95% - 100%)  CAPILLARY BLOOD GLUCOSE      POCT Blood Glucose.: 108 mg/dL (19 Dec 2019 22:17)  POCT Blood Glucose.: 128 mg/dL (19 Dec 2019 14:10)      INS/OUTS:    12-19 @ 07:01  -  12-20 @ 07:00  --------------------------------------------------------  IN:  Total IN: 0 mL    OUT:    Voided: 200 mL  Total OUT: 200 mL    Total NET: -200 mL      12-20 @ 07:01  -  12-20 @ 11:15  --------------------------------------------------------  IN:  Total IN: 0 mL    OUT:    Voided: 350 mL  Total OUT: 350 mL    Total NET: -350 mL        Drug Dosing Weight  Height (cm): 162.56 (20 Dec 2019 11:01)  Weight (kg): 76.4 (20 Dec 2019 11:01)  BMI (kg/m2): 28.9 (20 Dec 2019 11:01)  BSA (m2): 1.82 (20 Dec 2019 11:01)    PHYSICAL EXAM:    General: NAD  HEENT: Nasal bridge laceration. EOMI, PEERLA. no scalp lacerations   Neck: Soft, midline trachea. no cspine tenderness  Chest: No chest wall tenderness. or subq  emphysema   Cardiac: S1, S2, RRR  Respiratory: Bilateral breath sounds, clear and equal bilaterally  Abdomen: Soft, non-distended, non-tender, no rebound,   Groin: Normal appearing, pelvis stable   Ext: palp radial b/l UE, b/l DP palp in Lower Extrem.   Back: no TTP, no palpable runoff/stepoff/deformity    ------------------------------------------------------------------------------------------    LABS  CBC (12-20 @ 04:07)                              12.1<L>                         7.60    )----------------(  168        66.3  % Neutrophils, 19.1<L>% Lymphocytes, ANC: 5.04                                36.9<L>  CBC (12-19 @ 14:32)                              12.9<L>                         9.99    )----------------(  214        53.6  % Neutrophils, 31.8  % Lymphocytes, ANC: 5.35                                39.5<L>    BMP (12-20 @ 04:07)             140     |  103     |  17    		Ca++ --      Ca 8.7                ---------------------------------( 111<H>		Mg 1.8                4.2     |  21      |  0.8   			Ph --      BMP (12-19 @ 21:09)             --      |  --      |  --    		Ca++ --      Ca --                 ---------------------------------( --    		Mg 1.9                --      |  --      |  --    			Ph --        LFTs (12-20 @ 04:07)      TPro 6.6 / Alb 3.8 / TBili 0.7 / DBili -- / AST 28 / ALT 29 / AlkPhos 94  LFTs (12-19 @ 14:32)      TPro 6.9 / Alb 4.2 / TBili 0.6 / DBili -- / AST 24 / ALT 27 / AlkPhos 102    Coags (12-19 @ 14:32)  aPTT 28.2 / INR 1.23 / PT 14.10<H>    Cardiac Markers (12-19 @ 21:09)     HSTrop: -- / CKMB: -- / CK: 151  Cardiac Markers (12-19 @ 14:32)     HSTrop: -- / CKMB: -- / CK: 158    ABG (12-20 @ 04:07)      /  /  /  /  / %     Lactate:  1.0  ABG (12-19 @ 14:32)      /  /  /  /  / %     Lactate:  3.2<H>        MICROBIOLOGY      ------------------------------------------------------------------------------------------    RADIOLOGICAL FINDINGS REVIEW:  ***  < from: CT Head No Cont (12.19.19 @ 15:40) >  Fracture of the nasal bones.    No evidence of acute maxillo-facial, orbital or mandibular fracture.    Parenchymal atrophy compatible with the patient's age  No evidence of acute intracranial hemorrhage or mass effect.    < end of copied text >      < from: CT Maxillofacial No Cont (12.19.19 @ 15:46) >  Fracture of the nasal bones.    No evidence of acute maxillo-facial, orbital or mandibular fracture.    Parenchymal atrophy compatible with the patient's age  No evidence of acute intracranial hemorrhage or mass effect.      < end of copied text >      < from: CT Cervical Spine No Cont (12.19.19 @ 15:44) >  Degenerative changes as described above. No evidence of fracture.    < end of copied text >      < from: CT Chest w/ IV Cont (12.19.19 @ 15:54) >    Minimally displaced fracture of the anterior right seventh rib, age indeterminate.    No evidence of solid injury in the thorax, abdomen or pelvis.    Right lower lobe atelectasis. However, underlying nodule cannot be excluded. Recommend follow-up CT in 3 months to document resolution.    < end of copied text >      List Injuries Identified to Date:    Complete heart block      List Operative and Interventional Radiological Procedures:        Consults (Date):  ***  [] Neurosurgery   [] Orthopedic Surgery  [] Spine Surgery  [] Plastic Surgery  [] ENT  [] Urology  [] PM&R  [] Social Work  [x] Cardiology  [x] Electrophysiology TERTIARY TRAUMA SURVEY  ------------------------------------------------------------------------------------------    Date/Time: 12-20-19 @ 11:15  Admit date:   Trauma activation:   Admit GCS:  	E-  4	V-  5	M-  6    HPI:  The patient is a 84-year-old male with a PMH of HTN, DVT/atrial fibrillation? (on Eliquis), hypothyroidism, and intellectual disability who presented after a syncopal event today.  The patient is a poor historian so further history was obtained from his sister at bedside.  The patient was reportedly walking to the store when he lost consciousness.  He is unable to provide further details about his syncopal event.    In the ED, the patient was found to be bradycardic and with complete heart block on ECG.  A transvenous pacemaker was placed and his HR subsequently improved. (19 Dec 2019 17:53)      INTERVAL EVENTS: no injuries    PAST MEDICAL & SURGICAL HISTORY:  Hypothyroidism  Hypertension  No significant past surgical history    FAMILY HISTORY:    SOCIAL HISTORY:     ALLERGIES: No Known Allergies      HOME MEDICATIONS:     CURRENT MEDICATIONS:   MEDICATIONS (STANDING): amLODIPine   Tablet 10 milliGRAM(s) Oral daily  influenza   Vaccine 0.5 milliLiter(s) IntraMuscular once  levothyroxine 25 MICROGram(s) Oral daily  pantoprazole    Tablet 40 milliGRAM(s) Oral before breakfast    MEDICATIONS (PRN):acetaminophen   Tablet .. 650 milliGRAM(s) Oral every 6 hours PRN Mild Pain (1 - 3)    ------------------------------------------------------------------------------------------    VITAL SIGNS  T(C): 35.8 (12-20-19 @ 11:01), Max: 36.7 (12-19-19 @ 20:00)  HR: 62 (12-20-19 @ 11:01) (26 - 100)  BP: 121/66 (12-20-19 @ 11:01) (109/58 - 169/84)  RR: 16 (12-20-19 @ 11:01) (16 - 23)  SpO2: 96% (12-20-19 @ 06:18) (95% - 100%)  CAPILLARY BLOOD GLUCOSE      POCT Blood Glucose.: 108 mg/dL (19 Dec 2019 22:17)  POCT Blood Glucose.: 128 mg/dL (19 Dec 2019 14:10)      INS/OUTS:    12-19 @ 07:01  -  12-20 @ 07:00  --------------------------------------------------------  IN:  Total IN: 0 mL    OUT:    Voided: 200 mL  Total OUT: 200 mL    Total NET: -200 mL      12-20 @ 07:01  -  12-20 @ 11:15  --------------------------------------------------------  IN:  Total IN: 0 mL    OUT:    Voided: 350 mL  Total OUT: 350 mL    Total NET: -350 mL        Drug Dosing Weight  Height (cm): 162.56 (20 Dec 2019 11:01)  Weight (kg): 76.4 (20 Dec 2019 11:01)  BMI (kg/m2): 28.9 (20 Dec 2019 11:01)  BSA (m2): 1.82 (20 Dec 2019 11:01)    PHYSICAL EXAM:    General: NAD  HEENT: Nasal bridge laceration. EOMI, PEERLA. no scalp lacerations   Neck: Soft, midline trachea. no cspine tenderness  Chest: No chest wall tenderness. or subq  emphysema   Cardiac: S1, S2, RRR  Respiratory: Bilateral breath sounds, clear and equal bilaterally  Abdomen: Soft, non-distended, non-tender, no rebound,   Groin: Normal appearing, pelvis stable   Ext: palp radial b/l UE, b/l DP palp in Lower Extrem.   Back: no TTP, no palpable runoff/stepoff/deformity    ------------------------------------------------------------------------------------------    LABS  CBC (12-20 @ 04:07)                              12.1<L>                         7.60    )----------------(  168        66.3  % Neutrophils, 19.1<L>% Lymphocytes, ANC: 5.04                                36.9<L>  CBC (12-19 @ 14:32)                              12.9<L>                         9.99    )----------------(  214        53.6  % Neutrophils, 31.8  % Lymphocytes, ANC: 5.35                                39.5<L>    BMP (12-20 @ 04:07)             140     |  103     |  17    		Ca++ --      Ca 8.7                ---------------------------------( 111<H>		Mg 1.8                4.2     |  21      |  0.8   			Ph --      BMP (12-19 @ 21:09)             --      |  --      |  --    		Ca++ --      Ca --                 ---------------------------------( --    		Mg 1.9                --      |  --      |  --    			Ph --        LFTs (12-20 @ 04:07)      TPro 6.6 / Alb 3.8 / TBili 0.7 / DBili -- / AST 28 / ALT 29 / AlkPhos 94  LFTs (12-19 @ 14:32)      TPro 6.9 / Alb 4.2 / TBili 0.6 / DBili -- / AST 24 / ALT 27 / AlkPhos 102    Coags (12-19 @ 14:32)  aPTT 28.2 / INR 1.23 / PT 14.10<H>    Cardiac Markers (12-19 @ 21:09)     HSTrop: -- / CKMB: -- / CK: 151  Cardiac Markers (12-19 @ 14:32)     HSTrop: -- / CKMB: -- / CK: 158    ABG (12-20 @ 04:07)      /  /  /  /  / %     Lactate:  1.0  ABG (12-19 @ 14:32)      /  /  /  /  / %     Lactate:  3.2<H>        MICROBIOLOGY      ------------------------------------------------------------------------------------------    RADIOLOGICAL FINDINGS REVIEW:  ***  < from: CT Head No Cont (12.19.19 @ 15:40) >  Fracture of the nasal bones.    No evidence of acute maxillo-facial, orbital or mandibular fracture.    Parenchymal atrophy compatible with the patient's age  No evidence of acute intracranial hemorrhage or mass effect.    < end of copied text >      < from: CT Maxillofacial No Cont (12.19.19 @ 15:46) >  Fracture of the nasal bones.    No evidence of acute maxillo-facial, orbital or mandibular fracture.    Parenchymal atrophy compatible with the patient's age  No evidence of acute intracranial hemorrhage or mass effect.      < end of copied text >      < from: CT Cervical Spine No Cont (12.19.19 @ 15:44) >  Degenerative changes as described above. No evidence of fracture.    < end of copied text >      < from: CT Chest w/ IV Cont (12.19.19 @ 15:54) >    Minimally displaced fracture of the anterior right seventh rib, age indeterminate.    No evidence of solid injury in the thorax, abdomen or pelvis.    Right lower lobe atelectasis. However, underlying nodule cannot be excluded. Recommend follow-up CT in 3 months to document resolution.    < end of copied text >      List Injuries Identified to Date:    Complete heart block      List Operative and Interventional Radiological Procedures:        Consults (Date):  ***  [] Neurosurgery   [] Orthopedic Surgery  [] Spine Surgery  [] Plastic Surgery  [] ENT  [] Urology  [] PM&R  [] Social Work  [x] Cardiology  [x] Electrophysiology

## 2019-12-20 NOTE — CONSULT NOTE ADULT - ASSESSMENT
CTS [place]    Patient interviewed and examined.  Sister interviewed by telephone, as she is the HCP.  Patient referred for DDD PPM.  Jacyure explained in detail including risks, benefits and alternatives, and patient agreed to proceed with surgery today,

## 2019-12-20 NOTE — PROGRESS NOTE ADULT - SUBJECTIVE AND OBJECTIVE BOX
Patient is a 84y old  Male who presents with a chief complaint of Complete heart block (20 Dec 2019 10:50)    INTERVAL HPI/OVERNIGHT EVENTS:  ICU Vital Signs Last 24 Hrs  T(C): 35.8 (20 Dec 2019 11:01), Max: 36.7 (19 Dec 2019 20:00)  T(F): 96.5 (20 Dec 2019 07:55), Max: 98 (19 Dec 2019 20:00)  HR: 62 (20 Dec 2019 11:01) (26 - 100)  BP: 121/66 (20 Dec 2019 11:01) (109/58 - 169/84)  BP(mean): 84 (20 Dec 2019 11:01) (74 - 112)  RR: 16 (20 Dec 2019 11:01) (16 - 23)  SpO2: 96% (20 Dec 2019 06:18) (95% - 100%)    I&O's Summary    19 Dec 2019 07:01  -  20 Dec 2019 07:00  --------------------------------------------------------  IN: 0 mL / OUT: 200 mL / NET: -200 mL    20 Dec 2019 07:01  -  20 Dec 2019 11:11  --------------------------------------------------------  IN: 0 mL / OUT: 350 mL / NET: -350 mL          LABS:                        12.1   7.60  )-----------( 168      ( 20 Dec 2019 04:07 )             36.9     12-20    140  |  103  |  17  ----------------------------<  111<H>  4.2   |  21  |  0.8    Ca    8.7      20 Dec 2019 04:07  Mg     1.8     12-20    TPro  6.6  /  Alb  3.8  /  TBili  0.7  /  DBili  x   /  AST  28  /  ALT  29  /  AlkPhos  94  12-20    PT/INR - ( 19 Dec 2019 14:32 )   PT: 14.10 sec;   INR: 1.23 ratio         PTT - ( 19 Dec 2019 14:32 )  PTT:28.2 sec    CAPILLARY BLOOD GLUCOSE      POCT Blood Glucose.: 108 mg/dL (19 Dec 2019 22:17)  POCT Blood Glucose.: 128 mg/dL (19 Dec 2019 14:10)        RADIOLOGY & ADDITIONAL TESTS:    Consultant(s) Notes Reviewed:  [x ] YES  [ ] NO    MEDICATIONS  (STANDING):  amLODIPine   Tablet 10 milliGRAM(s) Oral daily  chlorhexidine 4% Liquid 1 Application(s) Topical <User Schedule>  influenza   Vaccine 0.5 milliLiter(s) IntraMuscular once  levothyroxine 25 MICROGram(s) Oral daily  pantoprazole    Tablet 40 milliGRAM(s) Oral before breakfast    MEDICATIONS  (PRN):  acetaminophen   Tablet .. 650 milliGRAM(s) Oral every 6 hours PRN Mild Pain (1 - 3)    Physical Exam:   CONSTITUTIONAL: NAD  SKIN: warm, dry  HEAD: Normocephalic; atraumatic.  EYES: no conjunctival injection. EOMI.   ENT: No nasal discharge; airway clear.  NECK: Supple; non tender.  CARD: S1, S2 normal; no murmurs, gallops, or rubs. Transvenous paced.  RESP: No wheezes, rales or rhonchi.  ABD: soft ntnd.   EXT: Normal ROM.  No LE edema.   NEURO: No FND.     Assessment & Plan   85 YO M with PMH of HTN, hypothyroidism, h/o DVT, ?Paroxysmal A.Fib on Eliquis, hypercoagulable state, intellectual disability, brought in by EMS after an episode of syncope.     #Complete heart block  -s/p transvenous PM placement in the ER  -currently back in NSR with intermittent RV pacing  -avoid all AV rajat blocking drugs  - F/u TSH  - F/u ECHO   - hold Eliquis  - NPO past midnight for possible PPM implantation 12/20     #Paroxysmal A.Fib/Hypercoagulable state Patient is a 84y old  Male who presents with a chief complaint of Complete heart block (20 Dec 2019 10:50)    INTERVAL HPI/OVERNIGHT EVENTS:  ICU Vital Signs Last 24 Hrs  T(C): 35.8 (20 Dec 2019 11:01), Max: 36.7 (19 Dec 2019 20:00)  T(F): 96.5 (20 Dec 2019 07:55), Max: 98 (19 Dec 2019 20:00)  HR: 62 (20 Dec 2019 11:01) (26 - 100)  BP: 121/66 (20 Dec 2019 11:01) (109/58 - 169/84)  BP(mean): 84 (20 Dec 2019 11:01) (74 - 112)  RR: 16 (20 Dec 2019 11:01) (16 - 23)  SpO2: 96% (20 Dec 2019 06:18) (95% - 100%)    I&O's Summary    19 Dec 2019 07:01  -  20 Dec 2019 07:00  --------------------------------------------------------  IN: 0 mL / OUT: 200 mL / NET: -200 mL    20 Dec 2019 07:01  -  20 Dec 2019 11:11  --------------------------------------------------------  IN: 0 mL / OUT: 350 mL / NET: -350 mL          LABS:                        12.1   7.60  )-----------( 168      ( 20 Dec 2019 04:07 )             36.9     12-20    140  |  103  |  17  ----------------------------<  111<H>  4.2   |  21  |  0.8    Ca    8.7      20 Dec 2019 04:07  Mg     1.8     12-20    TPro  6.6  /  Alb  3.8  /  TBili  0.7  /  DBili  x   /  AST  28  /  ALT  29  /  AlkPhos  94  12-20    PT/INR - ( 19 Dec 2019 14:32 )   PT: 14.10 sec;   INR: 1.23 ratio         PTT - ( 19 Dec 2019 14:32 )  PTT:28.2 sec    CAPILLARY BLOOD GLUCOSE      POCT Blood Glucose.: 108 mg/dL (19 Dec 2019 22:17)  POCT Blood Glucose.: 128 mg/dL (19 Dec 2019 14:10)        RADIOLOGY & ADDITIONAL TESTS:    Consultant(s) Notes Reviewed:  [x ] YES  [ ] NO    MEDICATIONS  (STANDING):  amLODIPine   Tablet 10 milliGRAM(s) Oral daily  chlorhexidine 4% Liquid 1 Application(s) Topical <User Schedule>  influenza   Vaccine 0.5 milliLiter(s) IntraMuscular once  levothyroxine 25 MICROGram(s) Oral daily  pantoprazole    Tablet 40 milliGRAM(s) Oral before breakfast    MEDICATIONS  (PRN):  acetaminophen   Tablet .. 650 milliGRAM(s) Oral every 6 hours PRN Mild Pain (1 - 3)    Physical Exam:   CONSTITUTIONAL: NAD  SKIN: warm, dry  HEAD: Normocephalic; atraumatic.  EYES: no conjunctival injection. EOMI.   ENT: No nasal discharge; airway clear.  NECK: Supple; non tender.  CARD: S1, S2 normal; no murmurs, gallops, or rubs. Transvenous paced.  RESP: No wheezes, rales or rhonchi.  ABD: soft ntnd.   EXT: Normal ROM.  No LE edema.   NEURO: No FND.     Assessment & Plan   85 YO M with PMH of HTN, hypothyroidism, h/o DVT, ?Paroxysmal A.Fib on Eliquis, hypercoagulable state, intellectual disability, brought in by EMS after an episode of syncope.     #Complete heart block  - s/p transvenous PM placement in the ER  - currently back in NSR with intermittent RV pacing  - avoid all AV rajat blocking drugs  - F/u TSH  - F/u ECHO   - hold Eliquis  - f/u EP, PPM implantation 12/20

## 2019-12-20 NOTE — PROGRESS NOTE ADULT - SUBJECTIVE AND OBJECTIVE BOX
Patient is a 84y old  Male who presents with a chief complaint of Complete heart block (20 Dec 2019 10:41)      Subjective:  Patient seen and examined at bedside.        Review Of Systems: No chest pain, shortness of breath, or palpitations            Medications:  acetaminophen   Tablet .. 650 milliGRAM(s) Oral every 6 hours PRN  amLODIPine   Tablet 10 milliGRAM(s) Oral daily  chlorhexidine 4% Liquid 1 Application(s) Topical <User Schedule>  influenza   Vaccine 0.5 milliLiter(s) IntraMuscular once  levothyroxine 25 MICROGram(s) Oral daily  pantoprazole    Tablet 40 milliGRAM(s) Oral before breakfast      PAST MEDICAL & SURGICAL HISTORY:  Hypothyroidism  Hypertension  No significant past surgical history      Objective:  Vitals:  T(F): 96.5 (12-20), Max: 98 (12-19)  HR: 62 (12-20) (26 - 100)  BP: 121/66 (12-20) (109/58 - 169/84)  RR: 16 (12-20)  SpO2: 96% (12-20)  I&O's Summary    19 Dec 2019 07:01  -  20 Dec 2019 07:00  --------------------------------------------------------  IN: 0 mL / OUT: 200 mL / NET: -200 mL    20 Dec 2019 07:01  -  20 Dec 2019 10:50  --------------------------------------------------------  IN: 0 mL / OUT: 350 mL / NET: -350 mL        Physical Exam:  Appearance: No acute distress; well appearing  Eyes: PERRL, EOMI, pink conjunctiva  HENT: nasal ridge trauma  Cardiovascular: RRR, S1, S2, no murmurs, rubs, or gallops; no edema; no JVD  Respiratory: Clear to auscultation bilaterally  Gastrointestinal: soft, non-tender, non-distended with normal bowel sounds  Musculoskeletal: No clubbing; no joint deformity   Neurologic: Non-focal  Lymphatic: No lymphadenopathy  Psychiatry: AAOx3, mood & affect appropriate  Skin: No rashes, ecchymoses, or cyanosis                          12.1   7.60  )-----------( 168      ( 20 Dec 2019 04:07 )             36.9     12-20    140  |  103  |  17  ----------------------------<  111<H>  4.2   |  21  |  0.8    Ca    8.7      20 Dec 2019 04:07  Mg     1.8     12-20    TPro  6.6  /  Alb  3.8  /  TBili  0.7  /  DBili  x   /  AST  28  /  ALT  29  /  AlkPhos  94  12-20    PT/INR - ( 19 Dec 2019 14:32 )   PT: 14.10 sec;   INR: 1.23 ratio         PTT - ( 19 Dec 2019 14:32 )  PTT:28.2 sec  CARDIAC MARKERS ( 19 Dec 2019 21:09 )  x     / 0.03 ng/mL / 151 U/L / x     / 5.8 ng/mL  CARDIAC MARKERS ( 19 Dec 2019 14:32 )  x     / 0.02 ng/mL / 158 U/L / x     / x                  New ECG(s): Personally reviewed      Interpretation of Telemetry: Sinus rhythm with intermittent pacing

## 2019-12-20 NOTE — PROGRESS NOTE ADULT - ASSESSMENT
Patient is a 84y old  Male who presents with a chief complaint of Complete heart block (20 Dec 2019 10:41)    Hypothyroidism  Hypertension  Complete heart block    Recommendations:   PPM today   Keep NPO  f/u echo Patient is a 84y old  Male who presents with a chief complaint of Complete heart block (20 Dec 2019 10:41)    Hypothyroidism  Hypertension  Complete heart block  with severe bradycardia at  escape rate of 30/min requiring temporary pacing e     Recommendations:   PPM today WILL SCHEDU;E FOR  P PM  DDD    Keep NPO  f/u echo

## 2019-12-21 LAB
ALBUMIN SERPL ELPH-MCNC: 4.1 G/DL — SIGNIFICANT CHANGE UP (ref 3.5–5.2)
ALP SERPL-CCNC: 101 U/L — SIGNIFICANT CHANGE UP (ref 30–115)
ALT FLD-CCNC: 20 U/L — SIGNIFICANT CHANGE UP (ref 0–41)
ANION GAP SERPL CALC-SCNC: 18 MMOL/L — HIGH (ref 7–14)
APTT BLD: 30.1 SEC — SIGNIFICANT CHANGE UP (ref 27–39.2)
AST SERPL-CCNC: 20 U/L — SIGNIFICANT CHANGE UP (ref 0–41)
BASOPHILS # BLD AUTO: 0.03 K/UL — SIGNIFICANT CHANGE UP (ref 0–0.2)
BASOPHILS NFR BLD AUTO: 0.3 % — SIGNIFICANT CHANGE UP (ref 0–1)
BILIRUB SERPL-MCNC: 0.7 MG/DL — SIGNIFICANT CHANGE UP (ref 0.2–1.2)
BUN SERPL-MCNC: 27 MG/DL — HIGH (ref 10–20)
CALCIUM SERPL-MCNC: 8.7 MG/DL — SIGNIFICANT CHANGE UP (ref 8.5–10.1)
CHLORIDE SERPL-SCNC: 100 MMOL/L — SIGNIFICANT CHANGE UP (ref 98–110)
CO2 SERPL-SCNC: 21 MMOL/L — SIGNIFICANT CHANGE UP (ref 17–32)
CREAT SERPL-MCNC: 2.3 MG/DL — HIGH (ref 0.7–1.5)
EOSINOPHIL # BLD AUTO: 0.05 K/UL — SIGNIFICANT CHANGE UP (ref 0–0.7)
EOSINOPHIL NFR BLD AUTO: 0.4 % — SIGNIFICANT CHANGE UP (ref 0–8)
GLUCOSE SERPL-MCNC: 135 MG/DL — HIGH (ref 70–99)
HCT VFR BLD CALC: 40.5 % — LOW (ref 42–52)
HGB BLD-MCNC: 13.7 G/DL — LOW (ref 14–18)
IMM GRANULOCYTES NFR BLD AUTO: 0.4 % — HIGH (ref 0.1–0.3)
INR BLD: 1.31 RATIO — HIGH (ref 0.65–1.3)
LYMPHOCYTES # BLD AUTO: 1.14 K/UL — LOW (ref 1.2–3.4)
LYMPHOCYTES # BLD AUTO: 10.2 % — LOW (ref 20.5–51.1)
MAGNESIUM SERPL-MCNC: 1.8 MG/DL — SIGNIFICANT CHANGE UP (ref 1.8–2.4)
MCHC RBC-ENTMCNC: 28.3 PG — SIGNIFICANT CHANGE UP (ref 27–31)
MCHC RBC-ENTMCNC: 33.8 G/DL — SIGNIFICANT CHANGE UP (ref 32–37)
MCV RBC AUTO: 83.7 FL — SIGNIFICANT CHANGE UP (ref 80–94)
MONOCYTES # BLD AUTO: 1.25 K/UL — HIGH (ref 0.1–0.6)
MONOCYTES NFR BLD AUTO: 11.1 % — HIGH (ref 1.7–9.3)
NEUTROPHILS # BLD AUTO: 8.72 K/UL — HIGH (ref 1.4–6.5)
NEUTROPHILS NFR BLD AUTO: 77.6 % — HIGH (ref 42.2–75.2)
NRBC # BLD: 0 /100 WBCS — SIGNIFICANT CHANGE UP (ref 0–0)
PHOSPHATE SERPL-MCNC: 4.8 MG/DL — SIGNIFICANT CHANGE UP (ref 2.1–4.9)
PLATELET # BLD AUTO: 204 K/UL — SIGNIFICANT CHANGE UP (ref 130–400)
POTASSIUM SERPL-MCNC: 4.3 MMOL/L — SIGNIFICANT CHANGE UP (ref 3.5–5)
POTASSIUM SERPL-SCNC: 4.3 MMOL/L — SIGNIFICANT CHANGE UP (ref 3.5–5)
PROT SERPL-MCNC: 7.2 G/DL — SIGNIFICANT CHANGE UP (ref 6–8)
PROTHROM AB SERPL-ACNC: 15 SEC — HIGH (ref 9.95–12.87)
RBC # BLD: 4.84 M/UL — SIGNIFICANT CHANGE UP (ref 4.7–6.1)
RBC # FLD: 13.6 % — SIGNIFICANT CHANGE UP (ref 11.5–14.5)
SODIUM SERPL-SCNC: 139 MMOL/L — SIGNIFICANT CHANGE UP (ref 135–146)
WBC # BLD: 11.23 K/UL — HIGH (ref 4.8–10.8)
WBC # FLD AUTO: 11.23 K/UL — HIGH (ref 4.8–10.8)

## 2019-12-21 PROCEDURE — 71045 X-RAY EXAM CHEST 1 VIEW: CPT | Mod: 26

## 2019-12-21 PROCEDURE — 93010 ELECTROCARDIOGRAM REPORT: CPT

## 2019-12-21 PROCEDURE — 93288 INTERROG EVL PM/LDLS PM IP: CPT | Mod: 26

## 2019-12-21 PROCEDURE — 99233 SBSQ HOSP IP/OBS HIGH 50: CPT

## 2019-12-21 RX ORDER — HYDROCHLOROTHIAZIDE 25 MG
25 TABLET ORAL DAILY
Refills: 0 | Status: DISCONTINUED | OUTPATIENT
Start: 2019-12-21 | End: 2019-12-23

## 2019-12-21 RX ORDER — ACETAMINOPHEN 500 MG
650 TABLET ORAL EVERY 6 HOURS
Refills: 0 | Status: DISCONTINUED | OUTPATIENT
Start: 2019-12-21 | End: 2019-12-31

## 2019-12-21 RX ADMIN — HEPARIN SODIUM 5000 UNIT(S): 5000 INJECTION INTRAVENOUS; SUBCUTANEOUS at 21:41

## 2019-12-21 RX ADMIN — CHLORHEXIDINE GLUCONATE 1 APPLICATION(S): 213 SOLUTION TOPICAL at 05:43

## 2019-12-21 RX ADMIN — Medication 650 MILLIGRAM(S): at 18:40

## 2019-12-21 RX ADMIN — Medication 100 MILLIGRAM(S): at 07:13

## 2019-12-21 RX ADMIN — PANTOPRAZOLE SODIUM 40 MILLIGRAM(S): 20 TABLET, DELAYED RELEASE ORAL at 05:42

## 2019-12-21 RX ADMIN — AMLODIPINE BESYLATE 10 MILLIGRAM(S): 2.5 TABLET ORAL at 05:43

## 2019-12-21 RX ADMIN — Medication 30 MILLILITER(S): at 16:00

## 2019-12-21 RX ADMIN — Medication 100 MILLIGRAM(S): at 00:13

## 2019-12-21 RX ADMIN — Medication 25 MICROGRAM(S): at 05:42

## 2019-12-21 RX ADMIN — Medication 650 MILLIGRAM(S): at 19:10

## 2019-12-21 RX ADMIN — HEPARIN SODIUM 5000 UNIT(S): 5000 INJECTION INTRAVENOUS; SUBCUTANEOUS at 14:02

## 2019-12-21 RX ADMIN — Medication 25 MILLIGRAM(S): at 17:30

## 2019-12-21 NOTE — PROGRESS NOTE ADULT - SUBJECTIVE AND OBJECTIVE BOX
Progress Note:  Provider Speciality                            Hospitalist      KARLY DO MRN-[id number] 84y Male     CHIEF PRESENTING COMPLAINT:  Patient is a 84y old  Male who presents with a chief complaint of Complete heart block (21 Dec 2019 12:40)        SUBJECTIVE:  Patient was seen and examined at bedside.   sitting up in chair/ co bloating earlier today/ had BM yesterday/ denies pain/ sister at bedside.   No significant overnight events reported.     HISTORY OF PRESENTING ILLNESS:  HPI:  The patient is a 84-year-old male with a PMH of HTN, DVT/atrial fibrillation? (on Eliquis), hypothyroidism, and intellectual disability who presented after a syncopal event today.  The patient is a poor historian so further history was obtained from his sister at bedside.  The patient was reportedly walking to the store when he lost consciousness.  He is unable to provide further details about his syncopal event.    In the ED, the patient was found to be bradycardic and with complete heart block on ECG.  A transvenous pacemaker was placed and his HR subsequently improved. (19 Dec 2019 17:53)        REVIEW OF SYSTEMS:    At least 10 systems were reviewed in ROS. All systems reviewed  are within normal limits except for the complaints as described in Subjective.    PAST MEDICAL & SURGICAL HISTORY:  PAST MEDICAL & SURGICAL HISTORY:  Hypothyroidism  Hypertension  No significant past surgical history          VITAL SIGNS:  Vital Signs Last 24 Hrs  T(C): 35.9 (21 Dec 2019 14:00), Max: 36.1 (20 Dec 2019 20:00)  T(F): 96.6 (21 Dec 2019 14:00), Max: 96.9 (20 Dec 2019 20:00)  HR: 87 (21 Dec 2019 14:00) (77 - 96)  BP: 165/75 (21 Dec 2019 14:00) (145/78 - 175/87)  BP(mean): 99 (21 Dec 2019 14:00) (91 - 117)  RR: 27 (21 Dec 2019 14:00) (18 - 27)  SpO2: 95% (21 Dec 2019 14:00) (95% - 98%)          PHYSICAL EXAMINATION:    General: Awake, alert, Not in acute distress  HEENT:   EOMI, NO JVD, nose bridge with laceration with stitches in place with dried up blood  Heart: S1+S2 audible, no murmur  Lungs: bilateral  fair air entry, no wheezing, no crepitations.  Abdomen: Soft, non-tender, mildly distended. +ve BS  CNS: AAO  , no focal deficits.  Extremities:  No edema            CONSULTS:  Consultant(s) Notes Reviewed by me.   Care Discussed with Consultants/Other Providers where required.        MEDICATIONS:  MEDICATIONS  (STANDING):  amLODIPine   Tablet 10 milliGRAM(s) Oral daily  chlorhexidine 4% Liquid 1 Application(s) Topical <User Schedule>  heparin  Injectable 5000 Unit(s) SubCutaneous every 8 hours  influenza   Vaccine 0.5 milliLiter(s) IntraMuscular once  levothyroxine 25 MICROGram(s) Oral daily  pantoprazole    Tablet 40 milliGRAM(s) Oral before breakfast    MEDICATIONS  (PRN):  acetaminophen   Tablet .. 650 milliGRAM(s) Oral every 6 hours PRN Mild Pain (1 - 3)  aluminum hydroxide/magnesium hydroxide/simethicone Suspension 30 milliLiter(s) Oral every 6 hours PRN Dyspepsia      [facility]/I & O's:                        13.7   11.23 )-----------( 204      ( 21 Dec 2019 05:21 )             40.5     12-21    139  |  100  |  27<H>  ----------------------------<  135<H>  4.3   |  21  |  2.3<H>    Ca    8.7      21 Dec 2019 05:21  Phos  4.8     12-21  Mg     1.8     12-21    TPro  7.2  /  Alb  4.1  /  TBili  0.7  /  DBili  x   /  AST  20  /  ALT  20  /  AlkPhos  101  12-21    PT/INR - ( 21 Dec 2019 05:21 )   PT: 15.00 sec;   INR: 1.31 ratio         PTT - ( 21 Dec 2019 05:21 )  PTT:30.1 sec    CAPILLARY BLOOD GLUCOSE                    12-20-19 @ 07:01  -  12-21-19 @ 07:00  --------------------------------------------------------  IN: 350 mL / OUT: 660 mL / NET: -310 mL              ASSESSMENT/Plan:    83 YO M with PMH of HTN, hypothyroidism, h/o DVT, ?Paroxysmal A.Fib on Eliquis, hypercoagulable state, intellectual disability, brought in by EMS after an episode of syncope.     syncope episode due to complete heart block sp PPM placement:   sp PPM placement.  CTS fu appreciated.   EP fu appreciated- suggest patient does not need telemonitoring anymore/ok to restart eliquis 48 hours after PPM placement.   as per EP, patient to wear sling to left arm to not move L arm above shoulder level as patient participates with PT to avoid leads displacement.  start eliquis tomorrow evening dose.   TTE noted: normal EF with grade 1 diastolic dysfunction.   can downgrade patient to regular floor as per EP.     HTN:   BP on higher side  c/w norvasc/ add hctz 25mg daily, first dose now.     h/o A-FIB / ?DVT:   restart eliquis tomorrow evening dose post PPM placement.    nasal bone fractures:  denies pain for now  c/w tylenol prn.   ENT evaluation     Right 7th rib fracture  does not complaint of pain/ tylenol prn.   encourage use of incentive spirometry    PT/ physiatry consult - unsteady with gait      Progress note handoff:   pending: improvement of BP/ PT/physiatry/ ENT eval   disposition: unknown at this time  discussion: plan of care with patient and sister at bedside/ satisfied.

## 2019-12-21 NOTE — PROGRESS NOTE ADULT - SUBJECTIVE AND OBJECTIVE BOX
HPI:  The patient is a 84-year-old male with a PMH of HTN, DVT/atrial fibrillation? (on Eliquis), hypothyroidism, and intellectual disability who presented after a syncopal event today.  The patient is a poor historian so further history was obtained from his sister at bedside.  The patient was reportedly walking to the store when he lost consciousness.  He is unable to provide further details about his syncopal event.    In the ED, the patient was found to be bradycardic and with complete heart block on ECG.  A transvenous pacemaker was placed and his HR subsequently improved. (19 Dec 2019 17:53). Patient now POD #1 from DC PPM ( [facility]), no immediate postop complications noted.                            13.7   11.23 )-----------( 204      ( 21 Dec 2019 05:21 )             40.5    12-21    139  |  100  |  27<H>  ----------------------------<  135<H>  4.3   |  21  |  2.3<H>    Ca    8.7      21 Dec 2019 05:21  Phos  4.8     12-21  Mg     1.8     12-21    TPro  7.2  /  Alb  4.1  /  TBili  0.7  /  DBili  x   /  AST  20  /  ALT  20  /  AlkPhos  101  12-21      PT/INR - ( 21 Dec 2019 05:21 )   PT: 15.00 sec;   INR: 1.31 ratio         PTT - ( 21 Dec 2019 05:21 )  PTT:30.1 sec CARDIAC MARKERS ( 19 Dec 2019 21:09 )  x     / 0.03 ng/mL / 151 U/L / x     / 5.8 ng/mL  CARDIAC MARKERS ( 19 Dec 2019 14:32 )  x     / 0.02 ng/mL / 158 U/L / x     / x          RADIOLOGY  < from: Xray Chest 1 View AP/PA (12.20.19 @ 13:18) >  INTERPRETATION:  Clinical History / Reason for exam: Cardiac dysrhythmia.    Comparison : Chest radiograph prior day.    Technique/Positioning: Frontal portable, low lung volumes.    Findings:    Support devices: Dual-chamber left-sided pacemaker    Cardiac/mediastinum/hilum: Heart is enlarged.    Lung parenchyma/Pleura: Central pulmonary enlargement.    Skeleton/soft tissues: Without difference.    Impression:      Cardiomegaly with pulmonary venous hypertension, mild.    Interval pacemaker placement.    < end of copied text >    MEDICATIONS  (STANDING):  amLODIPine   Tablet 10 milliGRAM(s) Oral daily  chlorhexidine 4% Liquid 1 Application(s) Topical <User Schedule>  heparin  Injectable 5000 Unit(s) SubCutaneous every 8 hours  influenza   Vaccine 0.5 milliLiter(s) IntraMuscular once  levothyroxine 25 MICROGram(s) Oral daily  pantoprazole    Tablet 40 milliGRAM(s) Oral before breakfast    MEDICATIONS  (PRN):  acetaminophen   Tablet .. 650 milliGRAM(s) Oral every 6 hours PRN Mild Pain (1 - 3)      Vital Signs Last 24 Hrs  T(C): 35.9 (21 Dec 2019 08:00), Max: 36.1 (20 Dec 2019 20:00)  T(F): 96.6 (21 Dec 2019 08:00), Max: 96.9 (20 Dec 2019 20:00)  HR: 90 (21 Dec 2019 10:00) (62 - 90)  BP: 160/73 (21 Dec 2019 10:00) (121/66 - 175/87)  BP(mean): 105 (21 Dec 2019 10:00) (84 - 120)  RR: 25 (21 Dec 2019 10:00) (16 - 27)  SpO2: 97% (21 Dec 2019 10:00) (96% - 98%)    PHYSICAL EXAM:      Constitutional: Elderly man, NAD  Respiratory: CTA b/l. No rales, wheezing or rhonchi auscultated  Cardiovascular: S1S2, RRR. No rubs, murmurs, or gallops  Chest: Surgical dressing c/d/i. No drainage or hematoma noted.  Gastrointestinal: Soft, non-tender, non-distended  Extremities: pulses palpable. No clubbing, cyanosis or edema  Neurological: AO x2 (name and place)

## 2019-12-21 NOTE — CONSULT NOTE ADULT - ASSESSMENT
Pt is an 84 y.o male admitted after syncopal episode, found to be in complete heart block in ED - called to eval for nasal bone fracture, no septal hematoma or symptoms.    ·	bacitracin to wound on nasal bridge  ·	no acute ENT intervention  ·	w/d with attng

## 2019-12-21 NOTE — PROGRESS NOTE ADULT - SUBJECTIVE AND OBJECTIVE BOX
OPERATIVE PROCEDURE(s):    s/p ppm            POD #  1                     SURGEON(s): TITUS Zavala    HPI:  The patient is a 84-year-old male with a PMH of HTN, DVT/atrial fibrillation? (on Eliquis), hypothyroidism, and intellectual disability who presented after a syncopal event today.  The patient is a poor historian so further history was obtained from his sister at bedside.  The patient was reportedly walking to the store when he lost consciousness.  He is unable to provide further details about his syncopal event.    In the ED, the patient was found to be bradycardic and with complete heart block on ECG.  A transvenous pacemaker was placed and his HR subsequently improved. (19 Dec 2019 17:53). Patient now POD #1 from DC PPM ( [facility]), no immediate postop complications noted.    SUBJECTIVE ASSESSMENT: 84y Male patient seen and examined at bedside. Patient denies any acute complaints at this time.     Vital Signs Last 24 Hrs  T(F): 96.3 (21 Dec 2019 12:00), Max: 96.9 (20 Dec 2019 20:00)  HR: 96 (21 Dec 2019 12:00) (66 - 96)  BP: 151/78 (21 Dec 2019 12:00) (145/78 - 175/87)  BP(mean): 91 (21 Dec 2019 12:00) (91 - 120)  RR: 23 (21 Dec 2019 12:00) (18 - 27)  SpO2: 96% (21 Dec 2019 12:00) (96% - 98%)      Physical Exam:  General: NAD; A&Ox3  Cardiac: S1/S2, RRR, no murmur, no rubs  Lungs: unlabored respirations, CTA b/l, no wheeze, no rales, no crackles  Abdomen: Soft/NT/ND; positive bowel sounds x 4  Incisions: Incisions clean/dry/intact  Extremities: No edema b/l lower extremities; good capillary refill; no cyanosis; palpable 1+ pedal pulses b/l        LABS:                        13.7<L>  11.23<H> )-----------( 204      ( 21 Dec 2019 05:21 )             40.5<L>                        12.1<L>  7.60  )-----------( 168      ( 20 Dec 2019 04:07 )             36.9<L>    12-21    139  |  100  |  27<H>  ----------------------------<  135<H>  4.3   |  21  |  2.3<H>  12-20    140  |  103  |  17  ----------------------------<  111<H>  4.2   |  21  |  0.8    Ca    8.7      21 Dec 2019 05:21  Phos  4.8     12-21  Mg     1.8     12-21    TPro  7.2 [6.0 - 8.0]  /  Alb  4.1 [3.5 - 5.2]  /  TBili  0.7 [0.2 - 1.2]  /  DBili  x   /  AST  20 [0 - 41]  /  ALT  20 [0 - 41]  /  AlkPhos  101 [30 - 115]  12-21    PT/INR - ( 21 Dec 2019 05:21 )   PT: ;   INR: 1.31 ratio       PT/INR - ( 19 Dec 2019 14:32 )   PT: ;   INR: 1.23 ratio       PTT - ( 21 Dec 2019 05:21 )  PTT:30.1 sec, PTT - ( 19 Dec 2019 14:32 )  PTT:28.2 sec      Allergies  No Known Allergies  Intolerances      MEDICATIONS  (STANDING):  amLODIPine   Tablet 10 milliGRAM(s) Oral daily  chlorhexidine 4% Liquid 1 Application(s) Topical <User Schedule>  heparin  Injectable 5000 Unit(s) SubCutaneous every 8 hours  influenza   Vaccine 0.5 milliLiter(s) IntraMuscular once  levothyroxine 25 MICROGram(s) Oral daily  pantoprazole    Tablet 40 milliGRAM(s) Oral before breakfast    MEDICATIONS  (PRN):  acetaminophen   Tablet .. 650 milliGRAM(s) Oral every 6 hours PRN Mild Pain (1 - 3)      Assessment/Plan:  The patient is a 84-year-old male with a PMH of HTN, DVT/atrial fibrillation? (on Eliquis), hypothyroidism, and intellectual disability who presented after a syncopal event today.  Patient was found to be bradycardic and with complete heart block on ECG.  A transvenous pacemaker was placed and his HR subsequently improved. (19 Dec 2019 17:53). Patient now POD #1 from DC PPM ( [facility]), no immediate postop complications noted.  - Case and plan discussed with CTS - Dr. Zavala  - Continue supportive care.   - Continue DVT/GI prophylaxis  - Incentive Spirometry 10 times an hour  - Continue to advance physical activity as tolerated and continue PT/OT as directed  - EP: Interrogation complete, device functioning well. No events a sper EP. Please f/u with Dr. Watson in 4-6 weeks.  - Post-op PPM: No Heavy lifting >10 lbs and do not raise your arm above shoulder level for 4-6 weeks. Keep occlusive dressing intact, ok to shower with occlusive dressing, No submerging in water for 1 month. Occlusive dressing will be removed in cardiac surgery clinic by Dr. Zavala.   - Please follow up with Dr. Zavala at [address] [place] [address], 2nd floor [address] in 1 week.

## 2019-12-21 NOTE — PROGRESS NOTE ADULT - ASSESSMENT
The patient is a 84-year-old male with a PMH of HTN, DVT/atrial fibrillation? (on Eliquis), hypothyroidism, and intellectual disability who presented after a syncopal event today.  Patient was found to be bradycardic and with complete heart block on ECG.  A transvenous pacemaker was placed and his HR subsequently improved. (19 Dec 2019 17:53). Patient now POD #1 from DC PPM ( [facility]), no immediate postop complications noted.    Plan:  - Interrogation complete, device functioning well. No events  - Ok to downgrade  - Please f/u with Dr. Watson in 4-6 weeks    Discharge Instructions:  - Wear sling for 6 weeks on LUE  - May resume Eliquis starting tomorrow evening  - No heavy lifting >5 lbs. for 4-6 weeks  - Do not raise your arm above shoulder level for 4-6 weeks.   - Wound care per CT surgery  - No submerging in water for 1 month.  - Leave steri-strips in place, they will fall off on their own.  - No driving for 2 weeks.

## 2019-12-21 NOTE — CONSULT NOTE ADULT - SUBJECTIVE AND OBJECTIVE BOX
Pt is an 84 y.o male admitted after syncopal episode, found to be in complete heart block in ED - called to eval for nasal bone fracture. Pt seen and examined at bedside. Pt denies any active bleeding from the nose, no difficulty breathing through the nose, no pain.     PAST MEDICAL & SURGICAL HISTORY:  Hypothyroidism  Hypertension  No significant past surgical history  MEDICATIONS  (STANDING):  amLODIPine   Tablet 10 milliGRAM(s) Oral daily  chlorhexidine 4% Liquid 1 Application(s) Topical <User Schedule>  heparin  Injectable 5000 Unit(s) SubCutaneous every 8 hours  hydrochlorothiazide 25 milliGRAM(s) Oral daily  influenza   Vaccine 0.5 milliLiter(s) IntraMuscular once  levothyroxine 25 MICROGram(s) Oral daily  pantoprazole    Tablet 40 milliGRAM(s) Oral before breakfast  Allergies    No Known Allergies    Intolerances    Vital Signs Last 24 Hrs  T(C): 35.9 (21 Dec 2019 14:00), Max: 36.1 (20 Dec 2019 20:00)  T(F): 96.6 (21 Dec 2019 14:00), Max: 96.9 (20 Dec 2019 20:00)  HR: 87 (21 Dec 2019 14:00) (82 - 96)  BP: 165/75 (21 Dec 2019 14:00) (145/78 - 175/87)  BP(mean): 99 (21 Dec 2019 14:00) (91 - 117)  RR: 27 (21 Dec 2019 14:00) (20 - 27)  SpO2: 95% (21 Dec 2019 14:00) (95% - 98%)    GEN: NAD, awake and alert.  HEENT: + bruising and laceration noted over bridge of the nose. + mild TTP over area. no septal hematoma noted B/L, no active bleeding or clots noted. oral mucosa pink, no erythema/edema to post pharynx, uvula midline. neck suppple.                          13.7   11.23 )-----------( 204      ( 21 Dec 2019 05:21 )             40.5       < from: CT Maxillofacial No Cont (12.19.19 @ 15:46) >  EXAM:  CT MAXILLOFACIAL        EXAM:  CT BRAIN          PROCEDURE DATE:  12/19/2019    INTERPRETATION:  CLINICAL HISTORY/REASON FOR EXAM: Trauma    Technique: Noncontrast CT scan of the head and maxillofacial region and orbits Contiguous unenhanced CT axial images of the head from the base to the vertex and of the maxillofacial region down to the level of the mandible were obtained. Reformatted coronal and sagittal images were obtained.    Comparison: None      FINDINGS / HEAD CT:    There is prominence of the ventricles, cortical sulci, and basal cisterns consistent with parenchymal atrophy compatible with the patient's age.    Gray-white differentiation is maintained.    Calcifications are noted in the region of the basal ganglia    Patchy areas of hypodensity in the periventricular and subcortical white matter, likely consistent with chronic microvascular ischemic disease.     No evidence of acute intracranial hemorrhage, territorial infarct, or significant space-occupying lesion.     Beam hardening artifact is noted overlying the brain stem and posterior fossa which is inherent to CT in this location. Aside from mild atrophy, the posterior fossa is otherwise unremarkable.    No CT evidence of acute calvarial fracture, intracranial hemorrhage, midline shift,   or mass effect.    The mastoids are well-aerated. The temporal bone structures are symmetric and normal in appearance with no evidence of clouding or fluid collection.    FINDINGS / MAXILLO-FACIAL / ORBITAL:     There is no evidence of acute fracture or dislocation. The walls and floor of the orbits, paranasal sinuses, zygomatic arches, maxilla and mandible are intact.    The globes and orbital contents are unremarkable.    Fractures are noted of the anterior aspect  and the right and left nasal bones. A soft tissue defect is seen ventral to the site of the fracture.    The paranasal sinuses demonstrate mild mucosal thickening within the maxillary antra and opacification of one of the superior left ethmoid air cells. The remainder of the paranasal sinuses are clear.    No soft tissue swelling is noted.    Degenerative changes of the cervical spine are noted    IMPRESSION:    Fracture of the nasal bones.    No evidence of acute maxillo-facial, orbital or mandibular fracture.    Parenchymal atrophy compatible with the patient's age  No evidence of acute intracranial hemorrhage or mass effect.      JEAN MARIE MRAR M.D., ATTENDING RADIOLOGIST

## 2019-12-21 NOTE — CHART NOTE - NSCHARTNOTEFT_GEN_A_CORE
The patient is a 84-year-old male with a PMH of HTN, DVT/atrial fibrillation? (on Eliquis), hypothyroidism, and intellectual disability who presented after a syncopal event today.  The patient is a poor historian so further history was obtained from his sister at bedside.  The patient was reportedly walking to the store when he lost consciousness.  He is unable to provide further details about his syncopal event.    In the ED, the patient was found to be bradycardic and with complete heart block on ECG.  A transvenous pacemaker was placed and his HR subsequently improved.    CCU events: Patient was followed up by EP, PPM was placed on 12/20. D/w attending Dr. Damon. Patient ready to be downgraded to medical floor, doesn't need telemetry monitoring as per EP. PT and physiatry consults placed. Left arm sling placed to immobilize arm to prevent interference with PPM as pt tends to move arm excessively. Will restart Eliquis tomorrow 12/22.   ENT c/s for possible nasal bone fracture.    T(C): 35.9 (12-21-19 @ 14:00), Max: 36.1 (12-20-19 @ 20:00)  HR: 87 (12-21-19 @ 14:00) (77 - 96)  BP: 165/75 (12-21-19 @ 14:00) (145/78 - 175/87)  RR: 27 (12-21-19 @ 14:00) (18 - 27)  SpO2: 95% (12-21-19 @ 14:00) (95% - 98%)    General: Awake, alert, Not in acute distress  HEENT:   EOMI, NO JVD, nose bridge with laceration with stitches in place with dried up blood  Heart: S1+S2 audible, no murmur  Lungs: bilateral  fair air entry, no wheezing, no crepitations.  Abdomen: Soft, non-tender, mildly distended. +ve BS  CNS: AAO  , no focal deficits.  Extremities:  No edema        (12-21 @ 05:21)                      13.7  11.23 )-----------( 204                 40.5    Neutrophils = 8.72 (77.6%)  Lymphocytes = 1.14 (10.2%)  Eosinophils = 0.05 (0.4%)  Basophils = 0.03 (0.3%)  Monocytes = 1.25 (11.1%)  Bands = --%    12-21    139  |  100  |  27<H>  ----------------------------<  135<H>  4.3   |  21  |  2.3<H>    Ca    8.7      21 Dec 2019 05:21  Phos  4.8     12-21  Mg     1.8     12-21    TPro  7.2  /  Alb  4.1  /  TBili  0.7  /  DBili  x   /  AST  20  /  ALT  20  /  AlkPhos  101  12-21    ( 21 Dec 2019 05:21 )   PT: 15.00 sec;   INR: 1.31 ratio;       PTT:30.1 sec  CARDIAC MARKERS ( 19 Dec 2019 21:09 )  Trop 0.03 ng/mL<HH> /  U/L / CKMB x       < from: Xray Chest 1 View AP/PA (12.20.19 @ 13:18) >    Impression:      Cardiomegaly with pulmonary venous hypertension, mild.    Interval pacemaker placement.    < end of copied text >    85 YO M with PMH of HTN, hypothyroidism, h/o DVT, ?Paroxysmal A.Fib on Eliquis, hypercoagulable state, intellectual disability, brought in by EMS after an episode of syncope.     #syncope episode due to complete heart block sp PPM placement:   sp PPM placement.  CTS fu appreciated.   EP fu appreciated- suggest patient does not need telemonitoring anymore/ok to restart eliquis 48 hours after PPM placement.   as per EP, patient to wear sling to left arm to not move L arm above shoulder level as patient participates with PT to avoid leads displacement.  start eliquis tomorrow evening dose.   TTE noted: normal EF with grade 1 diastolic dysfunction.   can downgrade patient to regular floor as per EP.     #HTN:   BP on higher side  c/w norvasc/ add hctz 25mg daily, first dose now.     #h/o A-FIB / ?DVT:   restart eliquis tomorrow evening dose post PPM placement.    #nasal bone fractures:  denies pain for now  c/w tylenol prn.   ENT evaluation     #Right 7th rib fracture  does not complaint of pain/ tylenol prn.   encourage use of incentive spirometry    #PT/ physiatry consult - unsteady with gait    Diet: DASH  Dispo: medical floor  Activity: IAT  Fullcode

## 2019-12-22 LAB
ALBUMIN SERPL ELPH-MCNC: 4 G/DL — SIGNIFICANT CHANGE UP (ref 3.5–5.2)
ALP SERPL-CCNC: 104 U/L — SIGNIFICANT CHANGE UP (ref 30–115)
ALT FLD-CCNC: <5 U/L — SIGNIFICANT CHANGE UP (ref 0–41)
ANION GAP SERPL CALC-SCNC: 22 MMOL/L — HIGH (ref 7–14)
AST SERPL-CCNC: 17 U/L — SIGNIFICANT CHANGE UP (ref 0–41)
BASOPHILS # BLD AUTO: 0.04 K/UL — SIGNIFICANT CHANGE UP (ref 0–0.2)
BASOPHILS NFR BLD AUTO: 0.2 % — SIGNIFICANT CHANGE UP (ref 0–1)
BILIRUB SERPL-MCNC: 0.7 MG/DL — SIGNIFICANT CHANGE UP (ref 0.2–1.2)
BUN SERPL-MCNC: 55 MG/DL — HIGH (ref 10–20)
CALCIUM SERPL-MCNC: 8.4 MG/DL — LOW (ref 8.5–10.1)
CHLORIDE SERPL-SCNC: 97 MMOL/L — LOW (ref 98–110)
CO2 SERPL-SCNC: 19 MMOL/L — SIGNIFICANT CHANGE UP (ref 17–32)
CREAT SERPL-MCNC: 4.2 MG/DL — CRITICAL HIGH (ref 0.7–1.5)
EOSINOPHIL # BLD AUTO: 0.03 K/UL — SIGNIFICANT CHANGE UP (ref 0–0.7)
EOSINOPHIL NFR BLD AUTO: 0.2 % — SIGNIFICANT CHANGE UP (ref 0–8)
GLUCOSE SERPL-MCNC: 132 MG/DL — HIGH (ref 70–99)
HCT VFR BLD CALC: 41.7 % — LOW (ref 42–52)
HGB BLD-MCNC: 13.8 G/DL — LOW (ref 14–18)
IMM GRANULOCYTES NFR BLD AUTO: 0.5 % — HIGH (ref 0.1–0.3)
LYMPHOCYTES # BLD AUTO: 1.35 K/UL — SIGNIFICANT CHANGE UP (ref 1.2–3.4)
LYMPHOCYTES # BLD AUTO: 8.1 % — LOW (ref 20.5–51.1)
MCHC RBC-ENTMCNC: 27.6 PG — SIGNIFICANT CHANGE UP (ref 27–31)
MCHC RBC-ENTMCNC: 33.1 G/DL — SIGNIFICANT CHANGE UP (ref 32–37)
MCV RBC AUTO: 83.4 FL — SIGNIFICANT CHANGE UP (ref 80–94)
MONOCYTES # BLD AUTO: 1.68 K/UL — HIGH (ref 0.1–0.6)
MONOCYTES NFR BLD AUTO: 10.1 % — HIGH (ref 1.7–9.3)
NEUTROPHILS # BLD AUTO: 13.44 K/UL — HIGH (ref 1.4–6.5)
NEUTROPHILS NFR BLD AUTO: 80.9 % — HIGH (ref 42.2–75.2)
NRBC # BLD: 0 /100 WBCS — SIGNIFICANT CHANGE UP (ref 0–0)
PLATELET # BLD AUTO: 205 K/UL — SIGNIFICANT CHANGE UP (ref 130–400)
POTASSIUM SERPL-MCNC: 4.3 MMOL/L — SIGNIFICANT CHANGE UP (ref 3.5–5)
POTASSIUM SERPL-SCNC: 4.3 MMOL/L — SIGNIFICANT CHANGE UP (ref 3.5–5)
PROT SERPL-MCNC: 7.5 G/DL — SIGNIFICANT CHANGE UP (ref 6–8)
RBC # BLD: 5 M/UL — SIGNIFICANT CHANGE UP (ref 4.7–6.1)
RBC # FLD: 14 % — SIGNIFICANT CHANGE UP (ref 11.5–14.5)
SODIUM SERPL-SCNC: 138 MMOL/L — SIGNIFICANT CHANGE UP (ref 135–146)
WBC # BLD: 16.63 K/UL — HIGH (ref 4.8–10.8)
WBC # FLD AUTO: 16.63 K/UL — HIGH (ref 4.8–10.8)

## 2019-12-22 PROCEDURE — 74019 RADEX ABDOMEN 2 VIEWS: CPT | Mod: 26

## 2019-12-22 PROCEDURE — 99233 SBSQ HOSP IP/OBS HIGH 50: CPT

## 2019-12-22 RX ORDER — APIXABAN 2.5 MG/1
2.5 TABLET, FILM COATED ORAL
Refills: 0 | Status: DISCONTINUED | OUTPATIENT
Start: 2019-12-22 | End: 2019-12-23

## 2019-12-22 RX ADMIN — Medication 25 MILLIGRAM(S): at 05:11

## 2019-12-22 RX ADMIN — Medication 25 MICROGRAM(S): at 05:11

## 2019-12-22 RX ADMIN — AMLODIPINE BESYLATE 10 MILLIGRAM(S): 2.5 TABLET ORAL at 05:11

## 2019-12-22 RX ADMIN — HEPARIN SODIUM 5000 UNIT(S): 5000 INJECTION INTRAVENOUS; SUBCUTANEOUS at 13:29

## 2019-12-22 RX ADMIN — HEPARIN SODIUM 5000 UNIT(S): 5000 INJECTION INTRAVENOUS; SUBCUTANEOUS at 05:11

## 2019-12-22 RX ADMIN — PANTOPRAZOLE SODIUM 40 MILLIGRAM(S): 20 TABLET, DELAYED RELEASE ORAL at 05:12

## 2019-12-22 RX ADMIN — APIXABAN 2.5 MILLIGRAM(S): 2.5 TABLET, FILM COATED ORAL at 17:30

## 2019-12-22 RX ADMIN — CHLORHEXIDINE GLUCONATE 1 APPLICATION(S): 213 SOLUTION TOPICAL at 05:10

## 2019-12-22 NOTE — PROGRESS NOTE ADULT - SUBJECTIVE AND OBJECTIVE BOX
Progress Note:  Provider Speciality                            Hospitalist      KARLY DO MRN-[id number] 84y Male     CHIEF PRESENTING COMPLAINT:  Patient is a 84y old  Male who presents with a chief complaint of Complete heart block (21 Dec 2019 12:40)        SUBJECTIVE:  Patient was seen and examined at bedside.   resting comfortably in bed/ denies any complaints/ abdomen is distended but unable to tell about BM or if passing gas.   No significant overnight events reported.     HISTORY OF PRESENTING ILLNESS:  HPI:  The patient is a 84-year-old male with a PMH of HTN, DVT/atrial fibrillation? (on Eliquis), hypothyroidism, and intellectual disability who presented after a syncopal event today.  The patient is a poor historian so further history was obtained from his sister at bedside.  The patient was reportedly walking to the store when he lost consciousness.  He is unable to provide further details about his syncopal event.    In the ED, the patient was found to be bradycardic and with complete heart block on ECG.  A transvenous pacemaker was placed and his HR subsequently improved. (19 Dec 2019 17:53)        REVIEW OF SYSTEMS:    At least 10 systems were reviewed in ROS. All systems reviewed  are within normal limits except for the complaints as described in Subjective.    PAST MEDICAL & SURGICAL HISTORY:  PAST MEDICAL & SURGICAL HISTORY:  Hypothyroidism  Hypertension  No significant past surgical history          VITAL SIGNS:  Vital Signs Last 24 Hrs  T(C): 36.4 (22 Dec 2019 13:50), Max: 38.8 (21 Dec 2019 18:27)  T(F): 97.6 (22 Dec 2019 13:50), Max: 101.9 (21 Dec 2019 18:27)  HR: 92 (22 Dec 2019 13:50) (86 - 94)  BP: 146/70 (22 Dec 2019 13:50) (126/67 - 169/83)  BP(mean): 106 (22 Dec 2019 05:00) (99 - 123)  RR: 18 (22 Dec 2019 13:50) (18 - 27)  SpO2: 93% (21 Dec 2019 20:17) (93% - 97%)          PHYSICAL EXAMINATION:    General: Awake, alert, Not in acute distress  HEENT:   EOMI, NO JVD, nose bridge with laceration with stitches in place with dried up blood  Heart: S1+S2 audible, no murmur  Lungs: bilateral  fair air entry, no wheezing, no crepitations.  Abdomen: Soft, non-tender,  distended. sluggish BS  CNS: AAO  , no focal deficits.  Extremities:  No edema            CONSULTS:  Consultant(s) Notes Reviewed by me.   Care Discussed with Consultants/Other Providers where required.        MEDICATIONS:  MEDICATIONS  (STANDING):  amLODIPine   Tablet 10 milliGRAM(s) Oral daily  chlorhexidine 4% Liquid 1 Application(s) Topical <User Schedule>  heparin  Injectable 5000 Unit(s) SubCutaneous every 8 hours  influenza   Vaccine 0.5 milliLiter(s) IntraMuscular once  levothyroxine 25 MICROGram(s) Oral daily  pantoprazole    Tablet 40 milliGRAM(s) Oral before breakfast    MEDICATIONS  (PRN):  acetaminophen   Tablet .. 650 milliGRAM(s) Oral every 6 hours PRN Mild Pain (1 - 3)  aluminum hydroxide/magnesium hydroxide/simethicone Suspension 30 milliLiter(s) Oral every 6 hours PRN Dyspepsia      LABOROTORY DATA/MICROBIOLOGY/I & O's:                        13.7   11.23 )-----------( 204      ( 21 Dec 2019 05:21 )             40.5     12-21    139  |  100  |  27<H>  ----------------------------<  135<H>  4.3   |  21  |  2.3<H>    Ca    8.7      21 Dec 2019 05:21  Phos  4.8     12-21  Mg     1.8     12-21    TPro  7.2  /  Alb  4.1  /  TBili  0.7  /  DBili  x   /  AST  20  /  ALT  20  /  AlkPhos  101  12-21    PT/INR - ( 21 Dec 2019 05:21 )   PT: 15.00 sec;   INR: 1.31 ratio         PTT - ( 21 Dec 2019 05:21 )  PTT:30.1 sec    CAPILLARY BLOOD GLUCOSE                    12-20-19 @ 07:01  -  12-21-19 @ 07:00  --------------------------------------------------------  IN: 350 mL / OUT: 660 mL / NET: -310 mL              ASSESSMENT/Plan:    83 YO M with PMH of HTN, hypothyroidism, h/o DVT, ?Paroxysmal A.Fib on Eliquis, hypercoagulable state, intellectual disability, brought in by EMS after an episode of syncope.     syncope episode due to complete heart block sp PPM placement:   sp PPM placement.  CTS signed off.    EP followed up and signed off.   as per EP, patient to wear sling to left arm to not move L arm above shoulder level as patient participates with PT to avoid leads displacement.  start eliquis tonight.   TTE noted: normal EF with grade 1 diastolic dysfunction.       HTN:   BP better controlled with HCTZ.  c/w norvasc/ HCTZ    h/o A-FIB / ?DVT:   restart eliquis tonight.     nasal bone fractures:  denies pain for now  c/w tylenol prn.   ENT evaluation appreciated- no intervention.    Right 7th rib fracture  does not complaint of pain/ tylenol prn.   encourage use of incentive spirometry    abdominal distension:   unclear report of BM  abdominal x ray for stool burden/ rule out obstruction.    PT/ physiatry consult - unsteady with gait      Progress note handoff:   pending: PT/physiatry  disposition: unknown at this time/ might require STR.  discussion: plan of care with patient / satisfied.

## 2019-12-22 NOTE — PROGRESS NOTE ADULT - ASSESSMENT
83 YO M with PMH of HTN, hypothyroidism, h/o DVT, ?Paroxysmal A.Fib on Eliquis, hypercoagulable state, intellectual disability, brought in by EMS after an episode of syncope.     #Syncope episode due to complete heart block  - S/p PPM placement 12/20  - Echo 12/20 LVEF 60%, Grade I Diastolic Dysfunction  - CTS following, no heavy lifting >10lbs or raising arm above shoulder level for 4-6 weeks, no submerging in water for 1 month, keep occlusive dressing intact- will be removed in cardiac surgery clinic by Dr. Zavala in 1 week  - EP following, no events on interrogation, follow up with Dr. Watson in 4-6 weeks    #HTN  - Continue Norvasc 10mg PO Daily  - Continue HCTZ 25mg PO Daily    #Afib  - Restart Eliquis tonight (48hr s/p PPM placement)    #Nasal bone fractures  #Right 7th rib fracture  - Per ENT, no acute intervention, continue bacitracin to wound on nasal bridge  - Continue incentive spirometry  - Continue Tylenol PRN pain    #Diet: DASH/TLC  #GI Prophylaxis: Protonix 40mg PO Daily  #DVT Prophylaxis: Eliquis 2.5mg BID  #Activity: Ambulate as tolerated  #Code Status: Full Code 83 YO M with PMH of HTN, hypothyroidism, h/o DVT, ?Paroxysmal A.Fib on Eliquis, hypercoagulable state, intellectual disability, brought in by EMS after an episode of syncope.     #Syncope episode due to complete heart block  - S/p PPM placement 12/20  - Echo 12/20 LVEF 60%, Grade I Diastolic Dysfunction  - CTS following, no heavy lifting >10lbs or raising arm above shoulder level for 4-6 weeks, no submerging in water for 1 month, keep occlusive dressing intact- will be removed in cardiac surgery clinic by Dr. Zavala in 1 week  - EP following, no events on interrogation, follow up with Dr. Watson in 4-6 weeks  - Follow up PT/Phys eval    #HTN  - Continue Norvasc 10mg PO Daily  - Continue HCTZ 25mg PO Daily    #Afib  - Restart Eliquis tonight (48hr s/p PPM placement)    #Nasal bone fractures  #Right 7th rib fracture  - Per ENT, no acute intervention, continue bacitracin to wound on nasal bridge  - Continue incentive spirometry  - Continue Tylenol PRN pain    #Diet: DASH/TLC  #GI Prophylaxis: Protonix 40mg PO Daily  #DVT Prophylaxis: Eliquis 2.5mg BID  #Activity: Ambulate as tolerated  #Code Status: Full Code

## 2019-12-22 NOTE — PROGRESS NOTE ADULT - SUBJECTIVE AND OBJECTIVE BOX
Patient Information:  KARLY DO / 84y / Male / MRN#:[id number]    Hospital Day: 3d    HPI:  The patient is a 84-year-old male with a PMH of HTN, DVT/atrial fibrillation? (on Eliquis), hypothyroidism, and intellectual disability who presented after a syncopal event today.  The patient is a poor historian so further history was obtained from his sister at bedside.  The patient was reportedly walking to the store when he lost consciousness.  He is unable to provide further details about his syncopal event.    In the ED, the patient was found to be bradycardic and with complete heart block on ECG.  A transvenous pacemaker was placed and his HR subsequently improved.    Interval History:  Patient seen and examined at bedside. No acute events overnight.    Past Medical History:  Hypothyroidism  Hypertension  No pertinent past medical history    Past Surgical History:  No significant past surgical history    Allergies:  No Known Allergies    Medications:  PRN:  acetaminophen   Tablet .. 650 milliGRAM(s) Oral every 6 hours PRN Mild Pain (1 - 3)  acetaminophen  Suppository .. 650 milliGRAM(s) Rectal every 6 hours PRN Temp greater or equal to 38.5C (101.3F)  aluminum hydroxide/magnesium hydroxide/simethicone Suspension 30 milliLiter(s) Oral every 6 hours PRN Dyspepsia    Standing:  amLODIPine   Tablet 10 milliGRAM(s) Oral daily  apixaban 2.5 milliGRAM(s) Oral two times a day  chlorhexidine 4% Liquid 1 Application(s) Topical <User Schedule>  hydrochlorothiazide 25 milliGRAM(s) Oral daily  influenza   Vaccine 0.5 milliLiter(s) IntraMuscular once  levothyroxine 25 MICROGram(s) Oral daily  pantoprazole    Tablet 40 milliGRAM(s) Oral before breakfast    Home:  amLODIPine 10 mg oral tablet: 1 tab(s) orally once a day  Eliquis:   levothyroxine 25 mcg (0.025 mg) oral tablet: 1 tab(s) orally once a day    Vitals:  T(C): 36.4, Max: 38.8 (12-21-19 @ 18:27)  T(F): 97.6, Max: 101.9 (12-21-19 @ 18:27)  HR: 92 (86 - 94)  BP: 146/70 (126/67 - 169/83)  RR: 18 (18 - 25)  SpO2: 93% (93% - 97%)    Physical Exam:  General: Awake, Alert. Not in acute distress.  Heart: Regular rate and rhythm; S1, S2; No murmurs.  Lungs: Clear to auscultation bilaterally.  Abdomen: Soft, nontender, nondistended.  Extremities: No edema in upper or lower extremities.  Neuro: AAOx3, No focal deficits.    Labs:  CBC (12-22 @ 06:22)                        Hgb: 13.8   WBC: 16.63 )-----------------( Plts: 205                              Hct: 41.7     Chem (12-22 @ 06:22)  Na: 138  |     Cl: 97     |  BUN: 55  -----------------------------------------< Gluc: 132    K: 4.3   |    HCO3: 19  |  Cr: 4.2    Ca 8.4 (12-22 @ 06:22)  Phos 4.8 (12-21 @ 05:21)  Mg 1.8 (12-21 @ 05:21)    LFTs (12-22 @ 06:22)  TPro 7.5  /  Alb 4.0  TBili 0.7  /  DBili     AST 17  /  ALT <5  /  AlkPhos 104    PT/INR (12-21 @ 05:21)  PT: 15.00; INR: 1.31   PTT: 30.1 Patient Information:  KARLY DO / 84y / Male / MRN#:[id number]    Hospital Day: 3d    HPI:  The patient is a 84-year-old male with a PMH of HTN, DVT/atrial fibrillation? (on Eliquis), hypothyroidism, and intellectual disability who presented after a syncopal event today.  The patient is a poor historian so further history was obtained from his sister at bedside.  The patient was reportedly walking to the store when he lost consciousness.  He is unable to provide further details about his syncopal event.    In the ED, the patient was found to be bradycardic and with complete heart block on ECG.  A transvenous pacemaker was placed and his HR subsequently improved.    Interval History:  Patient seen and examined at bedside. No acute events overnight. No complaints this morning.    Past Medical History:  Hypothyroidism  Hypertension  No pertinent past medical history    Past Surgical History:  No significant past surgical history    Allergies:  No Known Allergies    Medications:  PRN:  acetaminophen   Tablet .. 650 milliGRAM(s) Oral every 6 hours PRN Mild Pain (1 - 3)  acetaminophen  Suppository .. 650 milliGRAM(s) Rectal every 6 hours PRN Temp greater or equal to 38.5C (101.3F)  aluminum hydroxide/magnesium hydroxide/simethicone Suspension 30 milliLiter(s) Oral every 6 hours PRN Dyspepsia    Standing:  amLODIPine   Tablet 10 milliGRAM(s) Oral daily  apixaban 2.5 milliGRAM(s) Oral two times a day  chlorhexidine 4% Liquid 1 Application(s) Topical <User Schedule>  hydrochlorothiazide 25 milliGRAM(s) Oral daily  influenza   Vaccine 0.5 milliLiter(s) IntraMuscular once  levothyroxine 25 MICROGram(s) Oral daily  pantoprazole    Tablet 40 milliGRAM(s) Oral before breakfast    Home:  amLODIPine 10 mg oral tablet: 1 tab(s) orally once a day  Eliquis:   levothyroxine 25 mcg (0.025 mg) oral tablet: 1 tab(s) orally once a day    Vitals:  T(C): 36.4, Max: 38.8 (12-21-19 @ 18:27)  T(F): 97.6, Max: 101.9 (12-21-19 @ 18:27)  HR: 92 (86 - 94)  BP: 146/70 (126/67 - 169/83)  RR: 18 (18 - 25)  SpO2: 93% (93% - 97%)    Physical Exam:  General: Awake, Alert. Not in acute distress.  Heart: Regular rate and rhythm; S1, S2; No murmurs.  Lungs: Clear to auscultation bilaterally.  Abdomen: Soft, nontender, nondistended.  Extremities: No edema in upper or lower extremities.  Neuro: AAOx3, No focal deficits.    Labs:  CBC (12-22 @ 06:22)                        Hgb: 13.8   WBC: 16.63 )-----------------( Plts: 205                              Hct: 41.7     Chem (12-22 @ 06:22)  Na: 138  |     Cl: 97     |  BUN: 55  -----------------------------------------< Gluc: 132    K: 4.3   |    HCO3: 19  |  Cr: 4.2    Ca 8.4 (12-22 @ 06:22)  Phos 4.8 (12-21 @ 05:21)  Mg 1.8 (12-21 @ 05:21)    LFTs (12-22 @ 06:22)  TPro 7.5  /  Alb 4.0  TBili 0.7  /  DBili     AST 17  /  ALT <5  /  AlkPhos 104    PT/INR (12-21 @ 05:21)  PT: 15.00; INR: 1.31   PTT: 30.1

## 2019-12-23 LAB
ALBUMIN SERPL ELPH-MCNC: 3.3 G/DL — LOW (ref 3.5–5.2)
ALBUMIN SERPL ELPH-MCNC: 3.8 G/DL — SIGNIFICANT CHANGE UP (ref 3.5–5.2)
ALP SERPL-CCNC: 82 U/L — SIGNIFICANT CHANGE UP (ref 30–115)
ALP SERPL-CCNC: 94 U/L — SIGNIFICANT CHANGE UP (ref 30–115)
ALT FLD-CCNC: <5 U/L — SIGNIFICANT CHANGE UP (ref 0–41)
ALT FLD-CCNC: <5 U/L — SIGNIFICANT CHANGE UP (ref 0–41)
ANION GAP SERPL CALC-SCNC: 20 MMOL/L — HIGH (ref 7–14)
ANION GAP SERPL CALC-SCNC: 24 MMOL/L — HIGH (ref 7–14)
AST SERPL-CCNC: 11 U/L — SIGNIFICANT CHANGE UP (ref 0–41)
AST SERPL-CCNC: 14 U/L — SIGNIFICANT CHANGE UP (ref 0–41)
BASOPHILS # BLD AUTO: 0.04 K/UL — SIGNIFICANT CHANGE UP (ref 0–0.2)
BASOPHILS NFR BLD AUTO: 0.3 % — SIGNIFICANT CHANGE UP (ref 0–1)
BILIRUB SERPL-MCNC: 0.6 MG/DL — SIGNIFICANT CHANGE UP (ref 0.2–1.2)
BILIRUB SERPL-MCNC: 0.6 MG/DL — SIGNIFICANT CHANGE UP (ref 0.2–1.2)
BUN SERPL-MCNC: 88 MG/DL — CRITICAL HIGH (ref 10–20)
CALCIUM SERPL-MCNC: 7.9 MG/DL — LOW (ref 8.5–10.1)
CALCIUM SERPL-MCNC: 8.1 MG/DL — LOW (ref 8.5–10.1)
CHLORIDE SERPL-SCNC: 94 MMOL/L — LOW (ref 98–110)
CHLORIDE SERPL-SCNC: 94 MMOL/L — LOW (ref 98–110)
CO2 SERPL-SCNC: 17 MMOL/L — SIGNIFICANT CHANGE UP (ref 17–32)
CO2 SERPL-SCNC: 18 MMOL/L — SIGNIFICANT CHANGE UP (ref 17–32)
CREAT SERPL-MCNC: 3.5 MG/DL — HIGH (ref 0.7–1.5)
CREAT SERPL-MCNC: 5.5 MG/DL — CRITICAL HIGH (ref 0.7–1.5)
EOSINOPHIL # BLD AUTO: 0.02 K/UL — SIGNIFICANT CHANGE UP (ref 0–0.7)
EOSINOPHIL NFR BLD AUTO: 0.1 % — SIGNIFICANT CHANGE UP (ref 0–8)
GLUCOSE SERPL-MCNC: 115 MG/DL — HIGH (ref 70–99)
GLUCOSE SERPL-MCNC: 124 MG/DL — HIGH (ref 70–99)
HCT VFR BLD CALC: 39.6 % — LOW (ref 42–52)
HGB BLD-MCNC: 13.1 G/DL — LOW (ref 14–18)
IMM GRANULOCYTES NFR BLD AUTO: 0.9 % — HIGH (ref 0.1–0.3)
LYMPHOCYTES # BLD AUTO: 1.52 K/UL — SIGNIFICANT CHANGE UP (ref 1.2–3.4)
LYMPHOCYTES # BLD AUTO: 10.2 % — LOW (ref 20.5–51.1)
MAGNESIUM SERPL-MCNC: 2.5 MG/DL — HIGH (ref 1.8–2.4)
MCHC RBC-ENTMCNC: 27.7 PG — SIGNIFICANT CHANGE UP (ref 27–31)
MCHC RBC-ENTMCNC: 33.1 G/DL — SIGNIFICANT CHANGE UP (ref 32–37)
MCV RBC AUTO: 83.7 FL — SIGNIFICANT CHANGE UP (ref 80–94)
MONOCYTES # BLD AUTO: 1.74 K/UL — HIGH (ref 0.1–0.6)
MONOCYTES NFR BLD AUTO: 11.6 % — HIGH (ref 1.7–9.3)
NEUTROPHILS # BLD AUTO: 11.48 K/UL — HIGH (ref 1.4–6.5)
NEUTROPHILS NFR BLD AUTO: 76.9 % — HIGH (ref 42.2–75.2)
NRBC # BLD: 0 /100 WBCS — SIGNIFICANT CHANGE UP (ref 0–0)
PLATELET # BLD AUTO: 189 K/UL — SIGNIFICANT CHANGE UP (ref 130–400)
POTASSIUM SERPL-MCNC: 4.2 MMOL/L — SIGNIFICANT CHANGE UP (ref 3.5–5)
POTASSIUM SERPL-MCNC: 4.8 MMOL/L — SIGNIFICANT CHANGE UP (ref 3.5–5)
POTASSIUM SERPL-SCNC: 4.2 MMOL/L — SIGNIFICANT CHANGE UP (ref 3.5–5)
POTASSIUM SERPL-SCNC: 4.8 MMOL/L — SIGNIFICANT CHANGE UP (ref 3.5–5)
PROT SERPL-MCNC: 6.5 G/DL — SIGNIFICANT CHANGE UP (ref 6–8)
PROT SERPL-MCNC: 7.4 G/DL — SIGNIFICANT CHANGE UP (ref 6–8)
RBC # BLD: 4.73 M/UL — SIGNIFICANT CHANGE UP (ref 4.7–6.1)
RBC # FLD: 14 % — SIGNIFICANT CHANGE UP (ref 11.5–14.5)
SODIUM SERPL-SCNC: 132 MMOL/L — LOW (ref 135–146)
SODIUM SERPL-SCNC: 135 MMOL/L — SIGNIFICANT CHANGE UP (ref 135–146)
WBC # BLD: 14.94 K/UL — HIGH (ref 4.8–10.8)
WBC # FLD AUTO: 14.94 K/UL — HIGH (ref 4.8–10.8)

## 2019-12-23 PROCEDURE — 99223 1ST HOSP IP/OBS HIGH 75: CPT

## 2019-12-23 PROCEDURE — 74176 CT ABD & PELVIS W/O CONTRAST: CPT | Mod: 26

## 2019-12-23 PROCEDURE — 93970 EXTREMITY STUDY: CPT | Mod: 26

## 2019-12-23 PROCEDURE — 76770 US EXAM ABDO BACK WALL COMP: CPT | Mod: 26

## 2019-12-23 PROCEDURE — 99233 SBSQ HOSP IP/OBS HIGH 50: CPT

## 2019-12-23 RX ORDER — LACTULOSE 10 G/15ML
200 SOLUTION ORAL ONCE
Refills: 0 | Status: COMPLETED | OUTPATIENT
Start: 2019-12-23 | End: 2019-12-24

## 2019-12-23 RX ORDER — IOHEXOL 300 MG/ML
30 INJECTION, SOLUTION INTRAVENOUS ONCE
Refills: 0 | Status: COMPLETED | OUTPATIENT
Start: 2019-12-23 | End: 2019-12-23

## 2019-12-23 RX ORDER — SODIUM CHLORIDE 9 MG/ML
1000 INJECTION, SOLUTION INTRAVENOUS
Refills: 0 | Status: DISCONTINUED | OUTPATIENT
Start: 2019-12-23 | End: 2019-12-25

## 2019-12-23 RX ORDER — HEPARIN SODIUM 5000 [USP'U]/ML
5000 INJECTION INTRAVENOUS; SUBCUTANEOUS EVERY 8 HOURS
Refills: 0 | Status: DISCONTINUED | OUTPATIENT
Start: 2019-12-23 | End: 2019-12-25

## 2019-12-23 RX ADMIN — IOHEXOL 30 MILLILITER(S): 300 INJECTION, SOLUTION INTRAVENOUS at 18:29

## 2019-12-23 RX ADMIN — Medication 25 MILLIGRAM(S): at 05:04

## 2019-12-23 RX ADMIN — PANTOPRAZOLE SODIUM 40 MILLIGRAM(S): 20 TABLET, DELAYED RELEASE ORAL at 05:05

## 2019-12-23 RX ADMIN — Medication 25 MICROGRAM(S): at 05:04

## 2019-12-23 RX ADMIN — AMLODIPINE BESYLATE 10 MILLIGRAM(S): 2.5 TABLET ORAL at 05:04

## 2019-12-23 RX ADMIN — HEPARIN SODIUM 5000 UNIT(S): 5000 INJECTION INTRAVENOUS; SUBCUTANEOUS at 22:20

## 2019-12-23 RX ADMIN — APIXABAN 2.5 MILLIGRAM(S): 2.5 TABLET, FILM COATED ORAL at 05:04

## 2019-12-23 RX ADMIN — HEPARIN SODIUM 5000 UNIT(S): 5000 INJECTION INTRAVENOUS; SUBCUTANEOUS at 14:11

## 2019-12-23 RX ADMIN — CHLORHEXIDINE GLUCONATE 1 APPLICATION(S): 213 SOLUTION TOPICAL at 05:05

## 2019-12-23 NOTE — PROGRESS NOTE ADULT - SUBJECTIVE AND OBJECTIVE BOX
Patient Information:  KARLY DO / 84y / Male / MRN#:[id number]    Hospital Day: 4    HPI:  The patient is a 84-year-old male with a PMH of HTN, DVT/atrial fibrillation? (on Eliquis), hypothyroidism, and intellectual disability who presented after a syncopal event today.  The patient is a poor historian so further history was obtained from his sister at bedside.  The patient was reportedly walking to the store when he lost consciousness.  He is unable to provide further details about his syncopal event.    In the ED, the patient was found to be bradycardic and with complete heart block on ECG.  A transvenous pacemaker was placed and his HR subsequently improved.    Interval History:  Patient seen and examined at bedside. Xray of abdomen shows possibility of obstruction vs ileus    Past Medical History:  Hypothyroidism  Hypertension  No pertinent past medical history    Past Surgical History:  No significant past surgical history    Allergies:  No Known Allergies    Medications:  PRN:  acetaminophen   Tablet .. 650 milliGRAM(s) Oral every 6 hours PRN Mild Pain (1 - 3)  acetaminophen  Suppository .. 650 milliGRAM(s) Rectal every 6 hours PRN Temp greater or equal to 38.5C (101.3F)  aluminum hydroxide/magnesium hydroxide/simethicone Suspension 30 milliLiter(s) Oral every 6 hours PRN Dyspepsia    Standing:  MEDICATIONS  (STANDING):  amLODIPine   Tablet 10 milliGRAM(s) Oral daily  chlorhexidine 4% Liquid 1 Application(s) Topical <User Schedule>  heparin  Injectable 5000 Unit(s) SubCutaneous every 8 hours  influenza   Vaccine 0.5 milliLiter(s) IntraMuscular once  lactated ringers. 1000 milliLiter(s) (100 mL/Hr) IV Continuous <Continuous>  lactulose Retention Enema 200 Gram(s) Rectal once  levothyroxine 25 MICROGram(s) Oral daily  pantoprazole    Tablet 40 milliGRAM(s) Oral before breakfast    MEDICATIONS  (PRN):  acetaminophen   Tablet .. 650 milliGRAM(s) Oral every 6 hours PRN Mild Pain (1 - 3)  acetaminophen  Suppository .. 650 milliGRAM(s) Rectal every 6 hours PRN Temp greater or equal to 38.5C (101.3F)  aluminum hydroxide/magnesium hydroxide/simethicone Suspension 30 milliLiter(s) Oral every 6 hours PRN Dyspepsia      Home:  amLODIPine 10 mg oral tablet: 1 tab(s) orally once a day  Eliquis:   levothyroxine 25 mcg (0.025 mg) oral tablet: 1 tab(s) orally once a day    Vitals:  Vital Signs Last 24 Hrs  T(C): 36.8 (23 Dec 2019 13:08), Max: 37.4 (22 Dec 2019 20:33)  T(F): 98.3 (23 Dec 2019 13:08), Max: 99.4 (22 Dec 2019 20:33)  HR: 84 (23 Dec 2019 13:08) (84 - 87)  BP: 142/67 (23 Dec 2019 13:08) (142/67 - 147/70)  BP(mean): --  RR: 18 (23 Dec 2019 13:08) (18 - 18)  SpO2: 95% (23 Dec 2019 08:07) (95% - 95%)  Physical Exam:  General: Awake, Alert. Not in acute distress.  Heart: Regular rate and rhythm; S1, S2; No murmurs.  Lungs: Clear to auscultation bilaterally.  Abdomen: hard, distended, nontender, BS+  Extremities: No edema in upper or lower extremities., Right upper ext: dressing present (PPM placed)  Neuro: AAOx2-3 No focal deficits.    Labs:                          13.1   14.94 )-----------( 189      ( 23 Dec 2019 05:23 )             39.6     CBC (12-22 @ 06:22)                        Hgb: 13.8   WBC: 16.63 )-----------------( Plts: 205                              Hct: 41.7   12-23    135  |  94<L>  |  88<HH>  ----------------------------<  124<H>  4.8   |  17  |  5.5<HH>    Ca    8.1<L>      23 Dec 2019 05:23  Mg     2.5     12-23    TPro  7.4  /  Alb  3.8  /  TBili  0.6  /  DBili  x   /  AST  14  /  ALT  <5  /  AlkPhos  94  12-23        Chem (12-22 @ 06:22)  Na: 138  |     Cl: 97     |  BUN: 55  -----------------------------------------< Gluc: 132    K: 4.3   |    HCO3: 19  |  Cr: 4.2    Ca 8.4 (12-22 @ 06:22)  Phos 4.8 (12-21 @ 05:21)  Mg 1.8 (12-21 @ 05:21)    LFTs (12-22 @ 06:22)  TPro 7.5  /  Alb 4.0  TBili 0.7  /  DBili     AST 17  /  ALT <5  /  AlkPhos 104    PT/INR (12-21 @ 05:21)  PT: 15.00; INR: 1.31   PTT: 30.1

## 2019-12-23 NOTE — CONSULT NOTE ADULT - SUBJECTIVE AND OBJECTIVE BOX
NEPHROLOGY CONSULTATION NOTE    84-year-old male with a PMH of HTN, DVT/atrial fibrillation? (on Eliquis), hypothyroidism, and intellectual disability who presented after a syncopal event on 12/20  The patient is a poor historian so further history was obtained from his sister at bedside.  The patient was reportedly walking to the store when he lost consciousness.  He is unable to provide further details about his syncopal event.    In the ED, the patient was found to be bradycardic and with complete heart block on ECG.  A transvenous pacemaker was placed and his HR subsequently improved. (19 Dec 2019 17:53)  + nasal bone fx and right 7th rib fx    PAST MEDICAL & SURGICAL HISTORY:  Hypothyroidism  Hypertension  No significant past surgical history    Allergies:  No Known Allergies    Home Medications Reviewed  Hospital Medications:   MEDICATIONS  (STANDING):  amLODIPine   Tablet 10 milliGRAM(s) Oral daily  chlorhexidine 4% Liquid 1 Application(s) Topical <User Schedule>  heparin  Injectable 5000 Unit(s) SubCutaneous every 8 hours  influenza   Vaccine 0.5 milliLiter(s) IntraMuscular once  lactated ringers. 1000 milliLiter(s) (100 mL/Hr) IV Continuous <Continuous>  lactulose Retention Enema 200 Gram(s) Rectal once  levothyroxine 25 MICROGram(s) Oral daily  pantoprazole    Tablet 40 milliGRAM(s) Oral before breakfast      SOCIAL HISTORY:  Denies ETOH,Smoking,   FAMILY HISTORY:        REVIEW OF SYSTEMS:  CONSTITUTIONAL: No weakness, fevers or chills  EYES/ENT: No visual changes;  No vertigo or throat pain   NECK: No pain or stiffness  RESPIRATORY: No cough, wheezing, hemoptysis; No shortness of breath  CARDIOVASCULAR: No chest pain or palpitations.  GASTROINTESTINAL: No abdominal or epigastric pain. No nausea, vomiting, or hematemesis; No diarrhea or constipation. No melena or hematochezia.  GENITOURINARY: No dysuria, frequency, foamy urine, urinary urgency, incontinence or hematuria  NEUROLOGICAL: No numbness or weakness  SKIN: No itching, burning, rashes, or lesions   VASCULAR: No bilateral lower extremity edema.   All other review of systems is negative unless indicated above.    VITALS:  T(F): 98.3 (12-23-19 @ 13:08), Max: 99.4 (12-22-19 @ 20:33)  HR: 84 (12-23-19 @ 13:08)  BP: 142/67 (12-23-19 @ 13:08)  RR: 18 (12-23-19 @ 13:08)  SpO2: 95% (12-23-19 @ 08:07)    12-22 @ 07:01  -  12-23 @ 07:00  --------------------------------------------------------  IN: 0 mL / OUT: 350 mL / NET: -350 mL    12-23 @ 07:01  -  12-23 @ 15:17  --------------------------------------------------------  IN: 0 mL / OUT: 1 mL / NET: -1 mL          12-22-19 @ 07:01  -  12-23-19 @ 07:00  --------------------------------------------------------  IN: 0 mL / OUT: 250 mL / NET: -250 mL      I&O's Detail    22 Dec 2019 07:01  -  23 Dec 2019 07:00  --------------------------------------------------------  IN:  Total IN: 0 mL    OUT:    Incontinent per Condom Catheter: 250 mL    Voided: 100 mL  Total OUT: 350 mL    Total NET: -350 mL      23 Dec 2019 07:01  -  23 Dec 2019 15:17  --------------------------------------------------------  IN:  Total IN: 0 mL    OUT:    Stool: 1 mL  Total OUT: 1 mL    Total NET: -1 mL            PHYSICAL EXAM:  Constitutional: NAD  HEENT: anicteric sclera, oropharynx clear, MMM  Neck: No JVD  Respiratory: CTAB, no wheezes, rales or rhonchi  Cardiovascular: S1, S2, RRR  Gastrointestinal: BS+, soft, NT/ND  Extremities: No cyanosis or clubbing. No peripheral edema  Neurological: A/O x 3, no focal deficits  Psychiatric: Normal mood, normal affect  : No CVA tenderness. No manzano.   Skin: No rashes  Vascular Access:    LABS:  12-23    135  |  94<L>  |  88<HH>  ----------------------------<  124<H>  4.8   |  17  |  5.5<HH>    Ca    8.1<L>      23 Dec 2019 05:23  Mg     2.5     12-23    TPro  7.4  /  Alb  3.8  /  TBili  0.6  /  DBili      /  AST  14  /  ALT  <5  /  AlkPhos  94  12-23    Creatinine Trend: 5.5 <--, 4.2 <--, 2.3 <--, 0.8 <--, 1.1 <--                        13.1   14.94 )-----------( 189      ( 23 Dec 2019 05:23 )             39.6     Urine Studies:              RADIOLOGY & ADDITIONAL STUDIES: NEPHROLOGY CONSULTATION NOTE    84-year-old male with a PMH of HTN, DVT/atrial fibrillation? (on Eliquis), hypothyroidism, and intellectual disability who presented after a syncopal event on 12/20  The patient is a poor historian so further history was obtained from records.  The patient was reportedly walking to the store when he lost consciousness.  He is unable to provide further details about his syncopal event.    In the ED, the patient was found to be bradycardic and with complete heart block on ECG.  A transvenous pacemaker was placed and his HR subsequently improved. (19 Dec 2019 17:53)  + nasal bone fx and right 7th rib fx    when presented Cr was 1.1 was 0.8 on 12/20  underwent Ct w/ IV contrast and has been rapidly rising since has UOP but unclear how much  renal US w/ Mild fullness of the right renal collecting system. 370 cc pt didnt (couldnt?) urinate    no UA available     PAST MEDICAL & SURGICAL HISTORY:  Hypothyroidism  Hypertension  No significant past surgical history    Allergies:  No Known Allergies    Home Medications Reviewed  Hospital Medications:   MEDICATIONS  (STANDING):  amLODIPine   Tablet 10 milliGRAM(s) Oral daily  chlorhexidine 4% Liquid 1 Application(s) Topical <User Schedule>  heparin  Injectable 5000 Unit(s) SubCutaneous every 8 hours  influenza   Vaccine 0.5 milliLiter(s) IntraMuscular once  lactated ringers. 1000 milliLiter(s) (100 mL/Hr) IV Continuous <Continuous>  lactulose Retention Enema 200 Gram(s) Rectal once  levothyroxine 25 MICROGram(s) Oral daily  pantoprazole    Tablet 40 milliGRAM(s) Oral before breakfast      SOCIAL HISTORY:  Denies ETOH,Smoking,   FAMILY HISTORY:        REVIEW OF SYSTEMS:  unabel t obtain (pt confused )     VITALS:  T(F): 98.3 (12-23-19 @ 13:08), Max: 99.4 (12-22-19 @ 20:33)  HR: 84 (12-23-19 @ 13:08)  BP: 142/67 (12-23-19 @ 13:08)  RR: 18 (12-23-19 @ 13:08)  SpO2: 95% (12-23-19 @ 08:07)    12-22 @ 07:01  -  12-23 @ 07:00  --------------------------------------------------------  IN: 0 mL / OUT: 350 mL / NET: -350 mL    12-23 @ 07:01 - 12-23 @ 15:17  --------------------------------------------------------  IN: 0 mL / OUT: 1 mL / NET: -1 mL          12-22-19 @ 07:01  -  12-23-19 @ 07:00  --------------------------------------------------------  IN: 0 mL / OUT: 250 mL / NET: -250 mL      I&O's Detail    22 Dec 2019 07:01  -  23 Dec 2019 07:00  --------------------------------------------------------  IN:  Total IN: 0 mL    OUT:    Incontinent per Condom Catheter: 250 mL    Voided: 100 mL  Total OUT: 350 mL    Total NET: -350 mL      23 Dec 2019 07:01  -  23 Dec 2019 15:17  --------------------------------------------------------  IN:  Total IN: 0 mL    OUT:    Stool: 1 mL  Total OUT: 1 mL    Total NET: -1 mL            PHYSICAL EXAM:  Constitutional: NAD  HEENT: anicteric sclera, oropharynx clear, MMM  Neck: No JVD  Respiratory: CTAB, no wheezes, rales or rhonchi  Cardiovascular: S1, S2, RRR  Gastrointestinal: BS+, soft, NT/ND  Extremities: No cyanosis or clubbing. No peripheral edema  Neurological: Awake alert   Psychiatric: Normal mood, normal affect  : No CVA tenderness. No manzano.   Skin: No rashes  Vascular Access:    LABS:  12-23    135  |  94<L>  |  88<HH>  ----------------------------<  124<H>  4.8   |  17  |  5.5<HH>    Ca    8.1<L>      23 Dec 2019 05:23  Mg     2.5     12-23    TPro  7.4  /  Alb  3.8  /  TBili  0.6  /  DBili      /  AST  14  /  ALT  <5  /  AlkPhos  94  12-23    Creatinine Trend: 5.5 <--, 4.2 <--, 2.3 <--, 0.8 <--, 1.1 <--                        13.1   14.94 )-----------( 189      ( 23 Dec 2019 05:23 )             39.6     Urine Studies:              RADIOLOGY & ADDITIONAL STUDIES:

## 2019-12-23 NOTE — CONSULT NOTE ADULT - ASSESSMENT
IMPRESSION: Rehab of gait dysfunction    PRECAUTIONS: [x  ] Cardiac  [  ] Respiratory  [  ] Seizures [  ] Contact Isolation  [  ] Droplet Isolation  [  ] Other    Weight Bearing Status:     RECOMMENDATION:    Out of Bed to Chair     DVT/Decubiti Prophylaxis    REHAB PLAN:     [ x  ] Bedside P/T 3-5 times a week   [   ]   Bedside O/T  2-3 times a week             [   ] No Rehab Therapy Indicated                   [   ]  Speech Therapy   Conditioning/ROM                                    ADL  Bed Mobility                                               Conditioning/ROM  Transfers                                                     Bed Mobility  Sitting /Standing Balance                         Transfers                                        Gait Training                                               Sitting/Standing Balance  Stair Training [   ]Applicable                    Home equipment Eval                                                                        Splinting  [   ] Only      GOALS:   ADL   [   ]   Independent                    Transfers  [ x  ] Independent                          Ambulation  [ x  ] Independent     [  x  ] With device                            [   ]  CG                                                         [   ]  CG                                                                  [   ] CG                            [    ] Min A                                                   [   ] Min A                                                              [   ] Min  A          DISCHARGE PLAN:   [   ]  Good candidate for Intensive Rehabilitation/Hospital based                                             Will tolerate 3hrs Intensive Rehab Daily                                       [  x  ]  Short Term Rehab in Skilled Nursing Facility                           vs            [  x  ]  Home with Outpatient or VN services                                         [    ]  Possible Candidate for Intensive Hospital based Rehab

## 2019-12-23 NOTE — CONSULT NOTE ADULT - SUBJECTIVE AND OBJECTIVE BOX
HPI:  The patient is a 84-year-old male with a PMH of HTN, DVT/atrial fibrillation? (on Eliquis), hypothyroidism, and intellectual disability who presented after a syncopal event today.  The patient is a poor historian so further history was obtained from his sister at bedside.  The patient was reportedly walking to the store when he lost consciousness.  He is unable to provide further details about his syncopal event.    In the ED, the patient was found to be bradycardic and with complete heart block on ECG.  A transvenous pacemaker was placed and his HR subsequently improved. (19 Dec 2019 17:53)  + nasal bone fx and right 7th rib fx    PAST MEDICAL & SURGICAL HISTORY:  Hypothyroidism  Hypertension  No significant past surgical history      Hospital Course: s/p ppm    TODAY'S SUBJECTIVE & REVIEW OF SYMPTOMS:     Constitutional WNL   Cardio WNL   Resp WNL   GI WNL  Heme WNL  Endo WNL  Skin WNL  MSK Weakness  Neuro WNL  Cognitive WNL  Psych WNL      MEDICATIONS  (STANDING):  amLODIPine   Tablet 10 milliGRAM(s) Oral daily  apixaban 2.5 milliGRAM(s) Oral two times a day  chlorhexidine 4% Liquid 1 Application(s) Topical <User Schedule>  influenza   Vaccine 0.5 milliLiter(s) IntraMuscular once  levothyroxine 25 MICROGram(s) Oral daily  pantoprazole    Tablet 40 milliGRAM(s) Oral before breakfast    MEDICATIONS  (PRN):  acetaminophen   Tablet .. 650 milliGRAM(s) Oral every 6 hours PRN Mild Pain (1 - 3)  acetaminophen  Suppository .. 650 milliGRAM(s) Rectal every 6 hours PRN Temp greater or equal to 38.5C (101.3F)  aluminum hydroxide/magnesium hydroxide/simethicone Suspension 30 milliLiter(s) Oral every 6 hours PRN Dyspepsia      FAMILY HISTORY:      Allergies    No Known Allergies    Intolerances        SOCIAL HISTORY:    [  ] Etoh  [  ] Smoking  [  ] Substance abuse     Home Environment:  [x  ] Home Alone  [  ] Lives with Family  [  ] Home Health Aid    Dwelling:  [  ] Apartment  [ x ] Private House  [  ] Adult Home  [  ] Skilled Nursing Facility      [  ] Short Term  [  ] Long Term  [x  ] Stairs       Elevator [  ]    FUNCTIONAL STATUS PTA: (Check all that apply)  Ambulation: [ x  ]Independent    [  ] Dependent     [  ] Non-Ambulatory  Assistive Device: [  ] SA Cane  [  ]  Q Cane  [  ] Walker  [  ]  Wheelchair  ADL : [x  ] Independent  [  ]  Dependent       Vital Signs Last 24 Hrs  T(C): 37.2 (23 Dec 2019 04:59), Max: 37.4 (22 Dec 2019 20:33)  T(F): 98.9 (23 Dec 2019 04:59), Max: 99.4 (22 Dec 2019 20:33)  HR: 87 (23 Dec 2019 04:59) (85 - 92)  BP: 143/67 (23 Dec 2019 04:59) (143/67 - 147/70)  BP(mean): --  RR: 18 (23 Dec 2019 08:07) (18 - 18)  SpO2: 95% (23 Dec 2019 08:07) (95% - 95%)      PHYSICAL EXAM: Alert & awake  GENERAL: NAD  HEAD:  ecchymosis over nose  CHEST/LUNG: Clear   HEART: S1S2+  ABDOMEN: Soft, Nontender  EXTREMITIES:  no calf tenderness    NERVOUS SYSTEM:  Cranial Nerves 2-12 intact [  ] Abnormal  [  ]  ROM: WFL all extremities [  ]  Abnormal [ x ]limited lue  Motor Strength: WFL all extremities  [  ]  Abnormal [ x ]limited lue  Sensation: intact to light touch [x  ] Abnormal [  ]  Reflexes: Symmetric [  ]  Abnormal [  ]    FUNCTIONAL STATUS:  Bed Mobility: Independent [  ]  Supervision [  ]  Needs Assistance [x  ]  N/A [  ]  Transfers: Independent [  ]  Supervision [  ]  Needs Assistance [x  ]  N/A [  ]   Ambulation: Independent [  ]  Supervision [  ]  Needs Assistance [  ]  N/A [  ]  ADL: Independent [  ] Requires Assistance [  ] N/A [  ]      LABS:                        13.1   14.94 )-----------( 189      ( 23 Dec 2019 05:23 )             39.6     12-23    135  |  94<L>  |  88<HH>  ----------------------------<  124<H>  4.8   |  17  |  5.5<HH>    Ca    8.1<L>      23 Dec 2019 05:23  Mg     2.5     12-23    TPro  7.4  /  Alb  3.8  /  TBili  0.6  /  DBili  x   /  AST  14  /  ALT  <5  /  AlkPhos  94  12-23          RADIOLOGY & ADDITIONAL STUDIES:    Assesment:

## 2019-12-23 NOTE — PROGRESS NOTE ADULT - ASSESSMENT
83 YO M with PMH of HTN, hypothyroidism, h/o DVT, ?Paroxysmal A.Fib on Eliquis, hypercoagulable state, intellectual disability, brought in by EMS after an episode of syncope.     Pending/update: CT of abdomen, has manzano now, PEDRO needs to improve, if contiously worsen will need urgent dialysis    #) SBO vs ileus  -< from: Xray Abdomen 2 Views (12.22.19 @ 16:09) >  Few mildly dilated loops of small bowel, with paucity of distal bowel gas, is nonspecific, possibly reflecting bowel obstruction versus ileus. No evidence of free intraperitoneal air. Osseous structures are stable.  - NPO   -ordered Ct of abdomen with oral contrast to evaluate if its an obstruction or ileus secondary to anesthesia (recently got PPM placed)  - Surgery consulted > for possible surgical management    #) PEDRO  - creatinine increased from 0.8 to 5.5  - < from: US Kidney and Bladder (12.23.19 @ 12:08) >  Mild fullness of the right renal collecting system.  Prevoid volume of the urinary bladder was 370 stones. The patient did not want to urinate. Bladder walls are not thickened. There is no mass or debris.  - Nephrology recommended manzano catheter >   - PEDRO could be due to contrast or post-obstructive   - monitor BMP daily  - c/w LR  - in an event of PEDRO holding Eliquis, starting heparin drip goals 60-90, q6h    #Syncope episode due to complete heart block  - S/p PPM placement 12/20  - Echo 12/20 LVEF 60%, Grade I Diastolic Dysfunction  - CTS following, no heavy lifting >10lbs or raising arm above shoulder level for 4-6 weeks, no submerging in water for 1 month, keep occlusive dressing intact- will be removed in cardiac surgery clinic by Dr. Zavala in 1 week  - EP following, no events on interrogation, follow up with Dr. Watson in 4-6 weeks  - Follow up PT/Phys eval    #HTN  - Continue Norvasc 10mg PO Daily  - Continue HCTZ 25mg PO Daily    #Afib  - Restart Eliquis tonight (48hr s/p PPM placement)    #Nasal bone fractures  #Right 7th rib fracture  - Per ENT, no acute intervention, continue bacitracin to wound on nasal bridge  - Continue incentive spirometry  - Continue Tylenol PRN pain    #Diet:npo  #GI Prophylaxis: Protonix 40mg PO Daily  #DVT Prophylaxis: Eliquis 2.5mg BID  #Activity: Ambulate as tolerated  #Code Status: Full Code

## 2019-12-23 NOTE — PROGRESS NOTE ADULT - SUBJECTIVE AND OBJECTIVE BOX
Progress Note:  Provider Speciality                            Hospitalist      KARLY DO MRN-[id number] 84y Male     CHIEF PRESENTING COMPLAINT:  Patient is a 84y old  Male who presents with a chief complaint of Complete heart block (21 Dec 2019 12:40)        SUBJECTIVE:  Patient was seen and examined at bedside.   patient awake, alert but looks tired/ patient nephew at bedside concerned that patient is not at his baseline mentation and has not bee eating or drinking anything since yesterday. no report on BM/ asked PCA. unsure whether passing gas.   No significant overnight events reported.     HISTORY OF PRESENTING ILLNESS:  HPI:  The patient is a 84-year-old male with a PMH of HTN, DVT/atrial fibrillation? (on Eliquis), hypothyroidism, and intellectual disability who presented after a syncopal event today.  The patient is a poor historian so further history was obtained from his sister at bedside.  The patient was reportedly walking to the store when he lost consciousness.  He is unable to provide further details about his syncopal event.    In the ED, the patient was found to be bradycardic and with complete heart block on ECG.  A transvenous pacemaker was placed and his HR subsequently improved. (19 Dec 2019 17:53)        REVIEW OF SYSTEMS:    At least 10 systems were reviewed in ROS. All systems reviewed  are within normal limits except for the complaints as described in Subjective.    PAST MEDICAL & SURGICAL HISTORY:  PAST MEDICAL & SURGICAL HISTORY:  Hypothyroidism  Hypertension  No significant past surgical history          VITAL SIGNS:  Vital Signs Last 24 Hrs  T(C): 37.2 (23 Dec 2019 04:59), Max: 37.4 (22 Dec 2019 20:33)  T(F): 98.9 (23 Dec 2019 04:59), Max: 99.4 (22 Dec 2019 20:33)  HR: 87 (23 Dec 2019 04:59) (85 - 92)  BP: 143/67 (23 Dec 2019 04:59) (143/67 - 147/70)  BP(mean): --  RR: 18 (23 Dec 2019 08:07) (18 - 18)  SpO2: 95% (23 Dec 2019 08:07) (95% - 95%)          PHYSICAL EXAMINATION:    General: Awake, alert, Not in acute distress  HEENT:   EOMI, NO JVD, nose bridge with laceration with stitches in place with dried up blood  Heart: S1+S2 audible, no murmur  Lungs: bilateral  fair air entry, no wheezing, no crepitations.  Abdomen: Soft, non-tender,  distended. sluggish BS  CNS: AAO  , no focal deficits.  Extremities:  No edema            CONSULTS:  Consultant(s) Notes Reviewed by me.   Care Discussed with Consultants/Other Providers where required.        MEDICATIONS:  MEDICATIONS  (STANDING):  amLODIPine   Tablet 10 milliGRAM(s) Oral daily  chlorhexidine 4% Liquid 1 Application(s) Topical <User Schedule>  heparin  Injectable 5000 Unit(s) SubCutaneous every 8 hours  influenza   Vaccine 0.5 milliLiter(s) IntraMuscular once  levothyroxine 25 MICROGram(s) Oral daily  pantoprazole    Tablet 40 milliGRAM(s) Oral before breakfast    MEDICATIONS  (PRN):  acetaminophen   Tablet .. 650 milliGRAM(s) Oral every 6 hours PRN Mild Pain (1 - 3)  aluminum hydroxide/magnesium hydroxide/simethicone Suspension 30 milliLiter(s) Oral every 6 hours PRN Dyspepsia      LABOROTORY DATA/MICROBIOLOGY/I & O's:                        13.7   11.23 )-----------( 204      ( 21 Dec 2019 05:21 )             40.5     12-21    139  |  100  |  27<H>  ----------------------------<  135<H>  4.3   |  21  |  2.3<H>    Ca    8.7      21 Dec 2019 05:21  Phos  4.8     12-21  Mg     1.8     12-21    TPro  7.2  /  Alb  4.1  /  TBili  0.7  /  DBili  x   /  AST  20  /  ALT  20  /  AlkPhos  101  12-21    PT/INR - ( 21 Dec 2019 05:21 )   PT: 15.00 sec;   INR: 1.31 ratio         PTT - ( 21 Dec 2019 05:21 )  PTT:30.1 sec    CAPILLARY BLOOD GLUCOSE                    12-20-19 @ 07:01  -  12-21-19 @ 07:00  --------------------------------------------------------  IN: 350 mL / OUT: 660 mL / NET: -310 mL              ASSESSMENT/Plan:    85 YO M with PMH of HTN, hypothyroidism, h/o DVT, ?Paroxysmal A.Fib on Eliquis, hypercoagulable state, intellectual disability, brought in by EMS after an episode of syncope.     syncope episode due to complete heart block sp PPM placement:   sp PPM placement.  CTS signed off.    EP followed up and signed off.   as per EP, patient to wear sling to left arm to not move L arm above shoulder level as patient participates with PT to avoid leads displacement.  start eliquis tonight.   TTE noted: normal EF with grade 1 diastolic dysfunction.     Acute renal failure possibly pre-renal due to poor oral intake vs contrast induced injury:   renal function worsening   send urine lytes.   start IVF hydration .   hold eliquis.   check UA.   monitor I&O's.  tole nurse to replace condom cath and monitor I&O's.   Dced HCTZ  us renal   nephrology consult.     abdominal distension possible bowel obstruction vs ileus:   unclear report of BM  abdominal x ray noted: dilated bowel loops reflecting bowel obstruction vs ileus  keep NPO for now/ IVF hydration.  surgery consult    leukocytosis unclear etiology:   wbc trending down slowly.  check UA/UCX  CXR   abdominal xray  with abdominal distension.   send LA     HTN:   BP stable  c/w norvasc/dced  HCTZ    h/o ?A-FIB / ?DVT with h/o hypercoagulable state:    hold eliquis for ARF.  repeat LE doppler   heparin drip for AC for now.     nasal bone fractures:  denies pain for now  c/w tylenol prn.   ENT evaluation appreciated- no intervention.    Right 7th rib fracture  does not complaint of pain/ tylenol prn.   encourage use of incentive spirometry        PT/ physiatry consult       Progress note handoff:   pending: acute/ improvement of renal failure/ abdominal obstruction/nephro consult.  disposition: unknown at this time  discussion: plan of care with patient nephew at bedside / satisfied.

## 2019-12-23 NOTE — PHYSICAL THERAPY INITIAL EVALUATION ADULT - GENERAL OBSERVATIONS, REHAB EVAL
As per the family member, pt hasn't eaten anything since yesterday and very weak right now. Family member declined any PT for the pt at this Time. PT will f/u when appropriate.

## 2019-12-23 NOTE — CONSULT NOTE ADULT - ASSESSMENT
85 YO M with PMH of HTN, hypothyroidism, h/o DVT, ?Paroxysmal A.Fib on Eliquis, hypercoagulable state, intellectual disability, brought in by EMS after an episode of syncope.     syncope episode due to complete heart block sp PPM placement:     now w/ severe PEDRO    timing of PEDRO is certainly consistent w/ [place]. however it is most unusual for IV contrast to result in essentially complete renal failure in someone w/ relatively preserved renal function. Obstruction is a more common cause of sevre acute renal failure,  and US does show some fullness and large PVR (though no overt hydro)      No indication for HD as of yet, though at rate of rise will need HD in coming days   Agree w/ holding HCTZ  needs manzano, both to accurately measure UOP, and to ensure isn't obstructed   needs UA urine prot/cr ratio   cehck C3 C4 ARCELIA ANCA SPEP UPEP given severity of PEDRO rule out RPGN   discussed w/ house staff    will follow

## 2019-12-24 LAB
ALBUMIN SERPL ELPH-MCNC: 3.5 G/DL — SIGNIFICANT CHANGE UP (ref 3.5–5.2)
ALP SERPL-CCNC: 82 U/L — SIGNIFICANT CHANGE UP (ref 30–115)
ALT FLD-CCNC: <5 U/L — SIGNIFICANT CHANGE UP (ref 0–41)
ANION GAP SERPL CALC-SCNC: 15 MMOL/L — HIGH (ref 7–14)
ANION GAP SERPL CALC-SCNC: 16 MMOL/L — HIGH (ref 7–14)
ANION GAP SERPL CALC-SCNC: 16 MMOL/L — HIGH (ref 7–14)
APPEARANCE UR: ABNORMAL
AST SERPL-CCNC: 13 U/L — SIGNIFICANT CHANGE UP (ref 0–41)
BACTERIA # UR AUTO: NEGATIVE — SIGNIFICANT CHANGE UP
BASOPHILS # BLD AUTO: 0.04 K/UL — SIGNIFICANT CHANGE UP (ref 0–0.2)
BASOPHILS NFR BLD AUTO: 0.4 % — SIGNIFICANT CHANGE UP (ref 0–1)
BILIRUB SERPL-MCNC: 0.9 MG/DL — SIGNIFICANT CHANGE UP (ref 0.2–1.2)
BILIRUB UR-MCNC: NEGATIVE — SIGNIFICANT CHANGE UP
BUN SERPL-MCNC: 40 MG/DL — HIGH (ref 10–20)
BUN SERPL-MCNC: 42 MG/DL — HIGH (ref 10–20)
BUN SERPL-MCNC: 64 MG/DL — CRITICAL HIGH (ref 10–20)
BUN SERPL-MCNC: 77 MG/DL — CRITICAL HIGH (ref 10–20)
CALCIUM SERPL-MCNC: 8.2 MG/DL — LOW (ref 8.5–10.1)
CALCIUM SERPL-MCNC: 8.3 MG/DL — LOW (ref 8.5–10.1)
CALCIUM SERPL-MCNC: 8.4 MG/DL — LOW (ref 8.5–10.1)
CHLORIDE SERPL-SCNC: 96 MMOL/L — LOW (ref 98–110)
CHLORIDE SERPL-SCNC: 96 MMOL/L — LOW (ref 98–110)
CHLORIDE SERPL-SCNC: 99 MMOL/L — SIGNIFICANT CHANGE UP (ref 98–110)
CO2 SERPL-SCNC: 23 MMOL/L — SIGNIFICANT CHANGE UP (ref 17–32)
CO2 SERPL-SCNC: 23 MMOL/L — SIGNIFICANT CHANGE UP (ref 17–32)
CO2 SERPL-SCNC: 24 MMOL/L — SIGNIFICANT CHANGE UP (ref 17–32)
COLOR SPEC: YELLOW — SIGNIFICANT CHANGE UP
CREAT SERPL-MCNC: 1.1 MG/DL — SIGNIFICANT CHANGE UP (ref 0.7–1.5)
CREAT SERPL-MCNC: 1.2 MG/DL — SIGNIFICANT CHANGE UP (ref 0.7–1.5)
CREAT SERPL-MCNC: 2.1 MG/DL — HIGH (ref 0.7–1.5)
DIFF PNL FLD: ABNORMAL
EOSINOPHIL # BLD AUTO: 0.34 K/UL — SIGNIFICANT CHANGE UP (ref 0–0.7)
EOSINOPHIL NFR BLD AUTO: 3.4 % — SIGNIFICANT CHANGE UP (ref 0–8)
EPI CELLS # UR: 2 /HPF — SIGNIFICANT CHANGE UP (ref 0–5)
GLUCOSE SERPL-MCNC: 106 MG/DL — HIGH (ref 70–99)
GLUCOSE SERPL-MCNC: 124 MG/DL — HIGH (ref 70–99)
GLUCOSE SERPL-MCNC: 139 MG/DL — HIGH (ref 70–99)
GLUCOSE UR QL: NEGATIVE — SIGNIFICANT CHANGE UP
HCT VFR BLD CALC: 35.3 % — LOW (ref 42–52)
HGB BLD-MCNC: 12.1 G/DL — LOW (ref 14–18)
HYALINE CASTS # UR AUTO: 6 /LPF — SIGNIFICANT CHANGE UP (ref 0–7)
IMM GRANULOCYTES NFR BLD AUTO: 1.5 % — HIGH (ref 0.1–0.3)
KETONES UR-MCNC: NEGATIVE — SIGNIFICANT CHANGE UP
LEUKOCYTE ESTERASE UR-ACNC: ABNORMAL
LYMPHOCYTES # BLD AUTO: 1.44 K/UL — SIGNIFICANT CHANGE UP (ref 1.2–3.4)
LYMPHOCYTES # BLD AUTO: 14.4 % — LOW (ref 20.5–51.1)
MAGNESIUM SERPL-MCNC: 2.2 MG/DL — SIGNIFICANT CHANGE UP (ref 1.8–2.4)
MAGNESIUM SERPL-MCNC: 2.2 MG/DL — SIGNIFICANT CHANGE UP (ref 1.8–2.4)
MAGNESIUM SERPL-MCNC: 2.5 MG/DL — HIGH (ref 1.8–2.4)
MCHC RBC-ENTMCNC: 28.1 PG — SIGNIFICANT CHANGE UP (ref 27–31)
MCHC RBC-ENTMCNC: 34.3 G/DL — SIGNIFICANT CHANGE UP (ref 32–37)
MCV RBC AUTO: 81.9 FL — SIGNIFICANT CHANGE UP (ref 80–94)
MONOCYTES # BLD AUTO: 1.25 K/UL — HIGH (ref 0.1–0.6)
MONOCYTES NFR BLD AUTO: 12.5 % — HIGH (ref 1.7–9.3)
NEUTROPHILS # BLD AUTO: 6.78 K/UL — HIGH (ref 1.4–6.5)
NEUTROPHILS NFR BLD AUTO: 67.8 % — SIGNIFICANT CHANGE UP (ref 42.2–75.2)
NITRITE UR-MCNC: NEGATIVE — SIGNIFICANT CHANGE UP
NRBC # BLD: 0 /100 WBCS — SIGNIFICANT CHANGE UP (ref 0–0)
PH UR: 6 — SIGNIFICANT CHANGE UP (ref 5–8)
PLATELET # BLD AUTO: 199 K/UL — SIGNIFICANT CHANGE UP (ref 130–400)
POTASSIUM SERPL-MCNC: 3.9 MMOL/L — SIGNIFICANT CHANGE UP (ref 3.5–5)
POTASSIUM SERPL-MCNC: 4.1 MMOL/L — SIGNIFICANT CHANGE UP (ref 3.5–5)
POTASSIUM SERPL-MCNC: 4.3 MMOL/L — SIGNIFICANT CHANGE UP (ref 3.5–5)
POTASSIUM SERPL-SCNC: 3.9 MMOL/L — SIGNIFICANT CHANGE UP (ref 3.5–5)
POTASSIUM SERPL-SCNC: 4.1 MMOL/L — SIGNIFICANT CHANGE UP (ref 3.5–5)
POTASSIUM SERPL-SCNC: 4.3 MMOL/L — SIGNIFICANT CHANGE UP (ref 3.5–5)
PROT SERPL-MCNC: 6.7 G/DL — SIGNIFICANT CHANGE UP (ref 6–8)
PROT UR-MCNC: ABNORMAL
RBC # BLD: 4.31 M/UL — LOW (ref 4.7–6.1)
RBC # FLD: 13.9 % — SIGNIFICANT CHANGE UP (ref 11.5–14.5)
RBC CASTS # UR COMP ASSIST: 103 /HPF — HIGH (ref 0–4)
SODIUM SERPL-SCNC: 135 MMOL/L — SIGNIFICANT CHANGE UP (ref 135–146)
SODIUM SERPL-SCNC: 136 MMOL/L — SIGNIFICANT CHANGE UP (ref 135–146)
SODIUM SERPL-SCNC: 137 MMOL/L — SIGNIFICANT CHANGE UP (ref 135–146)
SP GR SPEC: 1.02 — SIGNIFICANT CHANGE UP (ref 1.01–1.02)
UROBILINOGEN FLD QL: SIGNIFICANT CHANGE UP
WBC # BLD: 10 K/UL — SIGNIFICANT CHANGE UP (ref 4.8–10.8)
WBC # FLD AUTO: 10 K/UL — SIGNIFICANT CHANGE UP (ref 4.8–10.8)
WBC UR QL: 18 /HPF — HIGH (ref 0–5)

## 2019-12-24 PROCEDURE — 99233 SBSQ HOSP IP/OBS HIGH 50: CPT

## 2019-12-24 RX ORDER — TAMSULOSIN HYDROCHLORIDE 0.4 MG/1
0.4 CAPSULE ORAL AT BEDTIME
Refills: 0 | Status: DISCONTINUED | OUTPATIENT
Start: 2019-12-24 | End: 2019-12-31

## 2019-12-24 RX ADMIN — CHLORHEXIDINE GLUCONATE 1 APPLICATION(S): 213 SOLUTION TOPICAL at 05:41

## 2019-12-24 RX ADMIN — SODIUM CHLORIDE 175 MILLILITER(S): 9 INJECTION, SOLUTION INTRAVENOUS at 21:18

## 2019-12-24 RX ADMIN — AMLODIPINE BESYLATE 10 MILLIGRAM(S): 2.5 TABLET ORAL at 05:40

## 2019-12-24 RX ADMIN — SODIUM CHLORIDE 175 MILLILITER(S): 9 INJECTION, SOLUTION INTRAVENOUS at 08:01

## 2019-12-24 RX ADMIN — HEPARIN SODIUM 5000 UNIT(S): 5000 INJECTION INTRAVENOUS; SUBCUTANEOUS at 05:40

## 2019-12-24 RX ADMIN — Medication 25 MICROGRAM(S): at 05:40

## 2019-12-24 RX ADMIN — HEPARIN SODIUM 5000 UNIT(S): 5000 INJECTION INTRAVENOUS; SUBCUTANEOUS at 21:11

## 2019-12-24 RX ADMIN — LACTULOSE 200 GRAM(S): 10 SOLUTION ORAL at 06:30

## 2019-12-24 RX ADMIN — PANTOPRAZOLE SODIUM 40 MILLIGRAM(S): 20 TABLET, DELAYED RELEASE ORAL at 05:40

## 2019-12-24 RX ADMIN — Medication 650 MILLIGRAM(S): at 21:41

## 2019-12-24 RX ADMIN — TAMSULOSIN HYDROCHLORIDE 0.4 MILLIGRAM(S): 0.4 CAPSULE ORAL at 21:11

## 2019-12-24 RX ADMIN — Medication 650 MILLIGRAM(S): at 21:11

## 2019-12-24 RX ADMIN — HEPARIN SODIUM 5000 UNIT(S): 5000 INJECTION INTRAVENOUS; SUBCUTANEOUS at 14:33

## 2019-12-24 NOTE — PHYSICAL THERAPY INITIAL EVALUATION ADULT - CRITERIA FOR SKILLED THERAPEUTIC INTERVENTIONS
functional limitations in following categories/risk reduction/prevention/rehab potential/predicted duration of therapy intervention/impairments found/therapy frequency

## 2019-12-24 NOTE — PROGRESS NOTE ADULT - SUBJECTIVE AND OBJECTIVE BOX
Patient Information:  KARLY DO / 84y / Male / MRN#:[id number]    Hospital Day: 5    HPI:  The patient is a 84-year-old male with a PMH of HTN, DVT/atrial fibrillation? (on Eliquis), hypothyroidism, and intellectual disability who presented after a syncopal event today.  The patient is a poor historian so further history was obtained from his sister at bedside.  The patient was reportedly walking to the store when he lost consciousness.  He is unable to provide further details about his syncopal event.    In the ED, the patient was found to be bradycardic and with complete heart block on ECG.  A transvenous pacemaker was placed and his HR subsequently improved.    Interval History:  Patient seen and examined at bedside. No overnight issues    Past Medical History:  Hypothyroidism  Hypertension  No pertinent past medical history    Past Surgical History:  No significant past surgical history    Allergies:  No Known Allergies    Medications:  PRN:  acetaminophen   Tablet .. 650 milliGRAM(s) Oral every 6 hours PRN Mild Pain (1 - 3)  acetaminophen  Suppository .. 650 milliGRAM(s) Rectal every 6 hours PRN Temp greater or equal to 38.5C (101.3F)  aluminum hydroxide/magnesium hydroxide/simethicone Suspension 30 milliLiter(s) Oral every 6 hours PRN Dyspepsia    Standing:  MEDICATIONS  (STANDING):  amLODIPine   Tablet 10 milliGRAM(s) Oral daily  chlorhexidine 4% Liquid 1 Application(s) Topical <User Schedule>  heparin  Injectable 5000 Unit(s) SubCutaneous every 8 hours  influenza   Vaccine 0.5 milliLiter(s) IntraMuscular once  lactated ringers. 1000 milliLiter(s) (100 mL/Hr) IV Continuous <Continuous>  lactulose Retention Enema 200 Gram(s) Rectal once  levothyroxine 25 MICROGram(s) Oral daily  pantoprazole    Tablet 40 milliGRAM(s) Oral before breakfast    MEDICATIONS  (PRN):  acetaminophen   Tablet .. 650 milliGRAM(s) Oral every 6 hours PRN Mild Pain (1 - 3)  acetaminophen  Suppository .. 650 milliGRAM(s) Rectal every 6 hours PRN Temp greater or equal to 38.5C (101.3F)  aluminum hydroxide/magnesium hydroxide/simethicone Suspension 30 milliLiter(s) Oral every 6 hours PRN Dyspepsia      Home:  amLODIPine 10 mg oral tablet: 1 tab(s) orally once a day  Eliquis:   levothyroxine 25 mcg (0.025 mg) oral tablet: 1 tab(s) orally once a day    Vitals:  Vital Signs Last 24 Hrs  T(C): 36.8 (23 Dec 2019 13:08), Max: 37.4 (22 Dec 2019 20:33)  T(F): 98.3 (23 Dec 2019 13:08), Max: 99.4 (22 Dec 2019 20:33)  HR: 84 (23 Dec 2019 13:08) (84 - 87)  BP: 142/67 (23 Dec 2019 13:08) (142/67 - 147/70)  BP(mean): --  RR: 18 (23 Dec 2019 13:08) (18 - 18)  SpO2: 95% (23 Dec 2019 08:07) (95% - 95%)  Physical Exam:  General: Awake, Alert. Not in acute distress.  Heart: Regular rate and rhythm; S1, S2; No murmurs.  Lungs: Clear to auscultation bilaterally.  Abdomen: hard, distended, nontender, BS+  Extremities: No edema in upper or lower extremities., Right upper ext: dressing present (PPM placed)  Neuro: AAOx2-3 No focal deficits.    Labs:                          12.1   10.00 )-----------( 199      ( 24 Dec 2019 06:05 )             35.3                           13.1   14.94 )-----------( 189      ( 23 Dec 2019 05:23 )             39.6     CBC (12-22 @ 06:22)                        Hgb: 13.8   WBC: 16.63 )-----------------( Plts: 205                              Hct: 41.7       12-24    136  |  96<L>  |  64<HH>  ----------------------------<  106<H>  4.1   |  24  |  2.1<H>    Ca    8.4<L>      24 Dec 2019 06:05  Mg     2.5     12-24    TPro  6.7  /  Alb  3.5  /  TBili  0.9  /  DBili  x   /  AST  13  /  ALT  <5  /  AlkPhos  82  12-24 12-23    135  |  94<L>  |  88<HH>  ----------------------------<  124<H>  4.8   |  17  |  5.5<HH>    Ca    8.1<L>      23 Dec 2019 05:23  Mg     2.5     12-23    TPro  7.4  /  Alb  3.8  /  TBili  0.6  /  DBili  x   /  AST  14  /  ALT  <5  /  AlkPhos  94  12-23        Chem (12-22 @ 06:22)  Na: 138  |     Cl: 97     |  BUN: 55  -----------------------------------------< Gluc: 132    K: 4.3   |    HCO3: 19  |  Cr: 4.2    Ca 8.4 (12-22 @ 06:22)  Phos 4.8 (12-21 @ 05:21)  Mg 1.8 (12-21 @ 05:21)    LFTs (12-22 @ 06:22)  TPro 7.5  /  Alb 4.0  TBili 0.7  /  DBili     AST 17  /  ALT <5  /  AlkPhos 104    PT/INR (12-21 @ 05:21)  PT: 15.00; INR: 1.31   PTT: 30.1

## 2019-12-24 NOTE — PROGRESS NOTE ADULT - SUBJECTIVE AND OBJECTIVE BOX
Progress Note:  Provider Speciality                            Hospitalist      KARLY DO MRN-[id number] 84y Male     CHIEF PRESENTING COMPLAINT:  Patient is a 84y old  Male who presents with a chief complaint of Complete heart block (21 Dec 2019 12:40)        SUBJECTIVE:  Patient was seen and examined at bedside.   patient is more awake, alert, talkative, reports he is doing better today after manzano cath placement yesterday.   having BM's as per resident  No significant overnight events reported.     HISTORY OF PRESENTING ILLNESS:  HPI:  The patient is a 84-year-old male with a PMH of HTN, DVT/atrial fibrillation? (on Eliquis), hypothyroidism, and intellectual disability who presented after a syncopal event today.  The patient is a poor historian so further history was obtained from his sister at bedside.  The patient was reportedly walking to the store when he lost consciousness.  He is unable to provide further details about his syncopal event.    In the ED, the patient was found to be bradycardic and with complete heart block on ECG.  A transvenous pacemaker was placed and his HR subsequently improved. (19 Dec 2019 17:53)        REVIEW OF SYSTEMS:    At least 10 systems were reviewed in ROS. All systems reviewed  are within normal limits except for the complaints as described in Subjective.    PAST MEDICAL & SURGICAL HISTORY:  PAST MEDICAL & SURGICAL HISTORY:  Hypothyroidism  Hypertension  No significant past surgical history          VITAL SIGNS:  Vital Signs Last 24 Hrs  T(C): 36.1 (24 Dec 2019 13:30), Max: 37.2 (23 Dec 2019 21:27)  T(F): 97 (24 Dec 2019 13:30), Max: 98.9 (23 Dec 2019 21:27)  HR: 79 (24 Dec 2019 13:30) (67 - 87)  BP: 144/67 (24 Dec 2019 13:30) (124/78 - 144/67)  BP(mean): --  RR: 18 (24 Dec 2019 13:30) (18 - 18)  SpO2: --        PHYSICAL EXAMINATION:    General: Awake, alert, Not in acute distress  HEENT:   EOMI, NO JVD, nose bridge with laceration with stitches in place with dried up blood  Heart: S1+S2 audible, no murmur  Lungs: bilateral  fair air entry, no wheezing, no crepitations.  Abdomen: Soft, non-tender,  distension -better, +ve BS  CNS: AAO  , no focal deficits.  : manzano cath with clear urine   Extremities:  No edema            CONSULTS:  Consultant(s) Notes Reviewed by me.   Care Discussed with Consultants/Other Providers where required.        MEDICATIONS:  MEDICATIONS  (STANDING):  amLODIPine   Tablet 10 milliGRAM(s) Oral daily  chlorhexidine 4% Liquid 1 Application(s) Topical <User Schedule>  heparin  Injectable 5000 Unit(s) SubCutaneous every 8 hours  influenza   Vaccine 0.5 milliLiter(s) IntraMuscular once  levothyroxine 25 MICROGram(s) Oral daily  pantoprazole    Tablet 40 milliGRAM(s) Oral before breakfast    MEDICATIONS  (PRN):  acetaminophen   Tablet .. 650 milliGRAM(s) Oral every 6 hours PRN Mild Pain (1 - 3)  aluminum hydroxide/magnesium hydroxide/simethicone Suspension 30 milliLiter(s) Oral every 6 hours PRN Dyspepsia      [facility] DATA/MICROBIOLOGY/I & O's:                        13.7   11.23 )-----------( 204      ( 21 Dec 2019 05:21 )             40.5     12-21    139  |  100  |  27<H>  ----------------------------<  135<H>  4.3   |  21  |  2.3<H>    Ca    8.7      21 Dec 2019 05:21  Phos  4.8     12-21  Mg     1.8     12-21    TPro  7.2  /  Alb  4.1  /  TBili  0.7  /  DBili  x   /  AST  20  /  ALT  20  /  AlkPhos  101  12-21    PT/INR - ( 21 Dec 2019 05:21 )   PT: 15.00 sec;   INR: 1.31 ratio         PTT - ( 21 Dec 2019 05:21 )  PTT:30.1 sec    CAPILLARY BLOOD GLUCOSE                    12-20-19 @ 07:01  -  12-21-19 @ 07:00  --------------------------------------------------------  IN: 350 mL / OUT: 660 mL / NET: -310 mL              ASSESSMENT/Plan:    85 YO M with PMH of HTN, hypothyroidism, h/o DVT, ?Paroxysmal A.Fib on Eliquis, hypercoagulable state, intellectual disability, brought in by EMS after an episode of syncope.     syncope episode due to complete heart block sp PPM placement:   sp PPM placement.  CTS signed off.    EP followed up and signed off.   as per EP, patient to wear sling to left arm to not move L arm above shoulder level as patient participates with PT to avoid leads displacement.  can restart eliquis as renal function is much better today.   TTE noted: normal EF with grade 1 diastolic dysfunction.     Acute renal failure possibly postobstructive and pre-renal due to poor oral intake vs contrast induced injury:   renal function improved significantly after manzano placement and with IVF hydration.  c/w IVF hydration for further improvement of renal function.   UA : not sent   continue holding HCTZ.   CT abdomen/pelvis noted - mild left hydroureter   c/w manzano catheter/ monitor I&O's.   Nephrology consult appreciated.     mild left hydroureter with abdominal distension/no abdominal bowel obstruction:    distension better after manzano palcement.   c/w manzano as per urology   start flomax   TOV after 3 days of flomax and patient more ambulatory.   repeat US renal in 48 hours as per urology to see resolution of hydroureter.       h/o ?A-FIB / ?DVT with h/o hypercoagulable state:    may resume eliquis /renal function is better.   repeat LE doppler shows no DVT.     cholelithiasis :   stable / no cholecystitis   fu surgery as OP for elective removal of GB    leukocytosis unclear etiology:   WBC improved significantly   afebrile.      HTN:   BP stable  c/w norvasc/dced  HCTZ       nasal bone fractures:  denies pain for now  c/w tylenol prn.   ENT evaluation appreciated- no intervention.    Right 7th rib fracture  does not complaint of pain/ tylenol prn.   encourage use of incentive spirometry        PT/ physiatry consult       Progress note handoff:   pending: further improvement of renal function/ PT / Physiatry   disposition: unknown at this time  discussion: plan of care with patient nephew at bedside / satisfied. Progress Note:  Provider Speciality                            Hospitalist      KARLY DO MRN-[id number] 84y Male     CHIEF PRESENTING COMPLAINT:  Patient is a 84y old  Male who presents with a chief complaint of Complete heart block (21 Dec 2019 12:40)        SUBJECTIVE:  Patient was seen and examined at bedside.   patient is more awake, alert, talkative, reports he is doing better today after manzano cath placement yesterday.   having BM's as per resident  No significant overnight events reported.     HISTORY OF PRESENTING ILLNESS:  HPI:  The patient is a 84-year-old male with a PMH of HTN, DVT/atrial fibrillation? (on Eliquis), hypothyroidism, and intellectual disability who presented after a syncopal event today.  The patient is a poor historian so further history was obtained from his sister at bedside.  The patient was reportedly walking to the store when he lost consciousness.  He is unable to provide further details about his syncopal event.    In the ED, the patient was found to be bradycardic and with complete heart block on ECG.  A transvenous pacemaker was placed and his HR subsequently improved. (19 Dec 2019 17:53)        REVIEW OF SYSTEMS:    At least 10 systems were reviewed in ROS. All systems reviewed  are within normal limits except for the complaints as described in Subjective.    PAST MEDICAL & SURGICAL HISTORY:  PAST MEDICAL & SURGICAL HISTORY:  Hypothyroidism  Hypertension  No significant past surgical history          VITAL SIGNS:  Vital Signs Last 24 Hrs  T(C): 36.1 (24 Dec 2019 13:30), Max: 37.2 (23 Dec 2019 21:27)  T(F): 97 (24 Dec 2019 13:30), Max: 98.9 (23 Dec 2019 21:27)  HR: 79 (24 Dec 2019 13:30) (67 - 87)  BP: 144/67 (24 Dec 2019 13:30) (124/78 - 144/67)  BP(mean): --  RR: 18 (24 Dec 2019 13:30) (18 - 18)  SpO2: --        PHYSICAL EXAMINATION:    General: Awake, alert, Not in acute distress  HEENT:   EOMI, NO JVD, nose bridge with laceration with stitches in place with dried up blood  Heart: S1+S2 audible, no murmur  Lungs: bilateral  fair air entry, no wheezing, no crepitations.  Abdomen: Soft, non-tender,  distension -better, +ve BS  CNS: AAO  , no focal deficits.  : manzano cath with clear urine   Extremities:  No edema            CONSULTS:  Consultant(s) Notes Reviewed by me.   Care Discussed with Consultants/Other Providers where required.        MEDICATIONS:  MEDICATIONS  (STANDING):  amLODIPine   Tablet 10 milliGRAM(s) Oral daily  chlorhexidine 4% Liquid 1 Application(s) Topical <User Schedule>  heparin  Injectable 5000 Unit(s) SubCutaneous every 8 hours  influenza   Vaccine 0.5 milliLiter(s) IntraMuscular once  levothyroxine 25 MICROGram(s) Oral daily  pantoprazole    Tablet 40 milliGRAM(s) Oral before breakfast    MEDICATIONS  (PRN):  acetaminophen   Tablet .. 650 milliGRAM(s) Oral every 6 hours PRN Mild Pain (1 - 3)  aluminum hydroxide/magnesium hydroxide/simethicone Suspension 30 milliLiter(s) Oral every 6 hours PRN Dyspepsia      [facility]/I & O's:                        13.7   11.23 )-----------( 204      ( 21 Dec 2019 05:21 )             40.5     12-21    139  |  100  |  27<H>  ----------------------------<  135<H>  4.3   |  21  |  2.3<H>    Ca    8.7      21 Dec 2019 05:21  Phos  4.8     12-21  Mg     1.8     12-21    TPro  7.2  /  Alb  4.1  /  TBili  0.7  /  DBili  x   /  AST  20  /  ALT  20  /  AlkPhos  101  12-21    PT/INR - ( 21 Dec 2019 05:21 )   PT: 15.00 sec;   INR: 1.31 ratio         PTT - ( 21 Dec 2019 05:21 )  PTT:30.1 sec    CAPILLARY BLOOD GLUCOSE                    12-20-19 @ 07:01  -  12-21-19 @ 07:00  --------------------------------------------------------  IN: 350 mL / OUT: 660 mL / NET: -310 mL              ASSESSMENT/Plan:    85 YO M with PMH of HTN, hypothyroidism, h/o DVT, ?Paroxysmal A.Fib on Eliquis, hypercoagulable state, intellectual disability, brought in by EMS after an episode of syncope.     syncope episode due to complete heart block sp PPM placement:   sp PPM placement.  CTS signed off.    EP followed up and signed off.   as per EP, patient to wear sling to left arm to not move L arm above shoulder level as patient participates with PT to avoid leads displacement.  can restart eliquis renally adjusted 2.5mg bid.   TTE noted: normal EF with grade 1 diastolic dysfunction.     Acute renal failure possibly postobstructive and pre-renal due to poor oral intake vs contrast induced injury:   renal function improved significantly after manzano placement and with IVF hydration.  c/w IVF hydration for further improvement of renal function.   UA : not sent   continue holding HCTZ.   CT abdomen/pelvis noted - mild left hydroureter   c/w manzano catheter/ monitor I&O's.   Nephrology consult appreciated.     mild left hydroureter with abdominal distension/no abdominal bowel obstruction:    distension better after manzano palcement.   c/w manzano as per urology   start flomax   TOV after 3 days of flomax and patient more ambulatory.   repeat US renal in 48 hours as per urology to see resolution of hydroureter.       h/o ?A-FIB / ?DVT with h/o hypercoagulable state:    restart  eliquis renally adjusted dose 2.5mg bid   repeat LE doppler shows no DVT.     cholelithiasis :   stable / no cholecystitis   fu surgery as OP for elective removal of GB    leukocytosis unclear etiology:   WBC improved significantly   afebrile.      HTN:   BP stable  c/w norvasc/dced  HCTZ       nasal bone fractures:  denies pain for now  c/w tylenol prn.   ENT evaluation appreciated- no intervention.    Right 7th rib fracture  does not complaint of pain/ tylenol prn.   encourage use of incentive spirometry        PT/ physiatry consult       Progress note handoff:   pending: further improvement of renal function/ PT / Physiatry   disposition: unknown at this time  discussion: plan of care with patient nephew at bedside / satisfied.

## 2019-12-24 NOTE — CONSULT NOTE ADULT - REASON FOR ADMISSION
Complete heart block

## 2019-12-24 NOTE — PHYSICAL THERAPY INITIAL EVALUATION ADULT - PRECAUTIONS/LIMITATIONS, REHAB EVAL
fall precautions/cardiac precautions cardiac precautions/L UE precauitiosn 2* to pacemaker placement/fall precautions

## 2019-12-24 NOTE — PHYSICAL THERAPY INITIAL EVALUATION ADULT - MANUAL MUSCLE TESTING RESULTS, REHAB EVAL
B UE's/ B LE's at least 3/5 R UE grossly WFL. L UE grossly WFL to 90* with respect to L UE precautions 2* to pacemaker placement/ B LE's at least 3/5

## 2019-12-24 NOTE — PHYSICAL THERAPY INITIAL EVALUATION ADULT - ORIENTATION, REHAB EVAL
not oriented to person, place, time or situation/unable to assess/pt was very evasive with answering questions

## 2019-12-24 NOTE — PROGRESS NOTE ADULT - SUBJECTIVE AND OBJECTIVE BOX
Nephrology progress note    Patient is seen and examined, events over the last 24 h noted .    Allergies:  No Known Allergies    Hospital Medications:   MEDICATIONS  (STANDING):  amLODIPine   Tablet 10 milliGRAM(s) Oral daily  chlorhexidine 4% Liquid 1 Application(s) Topical <User Schedule>  heparin  Injectable 5000 Unit(s) SubCutaneous every 8 hours  influenza   Vaccine 0.5 milliLiter(s) IntraMuscular once  lactated ringers. 1000 milliLiter(s) (175 mL/Hr) IV Continuous <Continuous>  levothyroxine 25 MICROGram(s) Oral daily  pantoprazole    Tablet 40 milliGRAM(s) Oral before breakfast  tamsulosin 0.4 milliGRAM(s) Oral at bedtime        VITALS:  T(F): 98.3 (12-24-19 @ 05:42), Max: 98.9 (12-23-19 @ 21:27)  HR: 67 (12-24-19 @ 05:42)  BP: 133/64 (12-24-19 @ 05:42)  RR: 18 (12-24-19 @ 05:42)  SpO2: --  Wt(kg): --    12-22 @ 07:01  -  12-23 @ 07:00  --------------------------------------------------------  IN: 0 mL / OUT: 350 mL / NET: -350 mL    12-23 @ 07:01  -  12-24 @ 07:00  --------------------------------------------------------  IN: 1000 mL / OUT: 4154 mL / NET: -3154 mL          PHYSICAL EXAM:  Constitutional: NAD  HEENT: anicteric sclera, oropharynx clear, MMM  Neck: No JVD  Respiratory: CTAB, no wheezes, rales or rhonchi  Cardiovascular: S1, S2, RRR  Gastrointestinal: BS+, soft, NT/ND  Extremities: No cyanosis or clubbing. No peripheral edema  :  No manzano.   Skin: No rashes    LABS:  12-24    136  |  96<L>  |  64<HH>  ----------------------------<  106<H>  4.1   |  24  |  2.1<H>    Ca    8.4<L>      24 Dec 2019 06:05  Mg     2.5     12-24    TPro  6.7  /  Alb  3.5  /  TBili  0.9  /  DBili      /  AST  13  /  ALT  <5  /  AlkPhos  82  12-24                          12.1   10.00 )-----------( 199      ( 24 Dec 2019 06:05 )             35.3       Urine Studies:      RADIOLOGY & ADDITIONAL STUDIES:

## 2019-12-24 NOTE — CONSULT NOTE ADULT - ATTENDING COMMENTS
I personally reviewed patient's CT images and discussed them with him.  I also explained the management of a nasal fracture and recommended an o/p f/u within a week.
ASSESSMENT/PLAN:   71M, s/p syncope, +HT, +LOC, +Eliquis, nasal bridge laceration.       - Full PAN scan w/ Max/fac - nasal bone fracture and age indeterminate rib fx (no corresponding chest wall tenderness   - full set of labs with elevation in lacate and mild elevation in trops   - At this time medical issues outweigh burden of traumatic injury (bradycardia requiring emergent pacing vs nasal bone fracture), agree with ED plan for admission to medical service for workup
agree with above
I personally saw and examined the patient, reviewed the chart and available data. I discussed the situation with the patient and his medical team as well as  staff. I also reviewed and/or amended the note as necessary.

## 2019-12-24 NOTE — CONSULT NOTE ADULT - SUBJECTIVE AND OBJECTIVE BOX
Patient is a 84y old  Male who presents with a chief complaint of Complete heart block (23 Dec 2019 15:17)      HPI:  The patient is a 84-year-old male with a PMH of HTN, DVT/atrial fibrillation? (on Eliquis), hypothyroidism, and intellectual disability who presented after a syncopal event today.  The patient is a poor historian so further history was obtained from his sister at bedside.  The patient was reportedly walking to the store when he lost consciousness.  He is unable to provide further details about his syncopal event.    In the ED, the patient was found to be bradycardic and with complete heart block on ECG.  A transvenous pacemaker was placed and his HR subsequently improved. (19 Dec 2019 17:53)    UROLOGY: Poor historian, medium to strong stream, frequency q 2 hrs, unsure of Nocturia #'s, + urgency with no incontinence, denies dysuria, hematuria, occasional hesitancy, no straining, voids in moderate amounts, no PV fullness or dribbling. Pt had Villavicencio placed for PEDRO with a PVR of 700 cc      PAST MEDICAL & SURGICAL HISTORY:  complete heart block s/p DDD pacemaker  Hypothyroidism  Hypertension  No significant past surgical history      REVIEW OF SYSTEMS:  [x] a 10 point review of systems was negative except where noted    [  ]  Due to altered mental status/intubation, subjective information was not able t be obtained from the patient.  History was obtained, to the extent possible, from review of the chart and collateral sources of information.      MEDICATIONS  (STANDING):  amLODIPine   Tablet 10 milliGRAM(s) Oral daily  chlorhexidine 4% Liquid 1 Application(s) Topical <User Schedule>  heparin  Injectable 5000 Unit(s) SubCutaneous every 8 hours  influenza   Vaccine 0.5 milliLiter(s) IntraMuscular once  lactated ringers. 1000 milliLiter(s) (175 mL/Hr) IV Continuous <Continuous>  levothyroxine 25 MICROGram(s) Oral daily  pantoprazole    Tablet 40 milliGRAM(s) Oral before breakfast    MEDICATIONS  (PRN):  acetaminophen   Tablet .. 650 milliGRAM(s) Oral every 6 hours PRN Mild Pain (1 - 3)  acetaminophen  Suppository .. 650 milliGRAM(s) Rectal every 6 hours PRN Temp greater or equal to 38.5C (101.3F)  aluminum hydroxide/magnesium hydroxide/simethicone Suspension 30 milliLiter(s) Oral every 6 hours PRN Dyspepsia      Allergies    No Known Allergies    SOCIAL HISTORY: No illicit drug use    FAMILY HISTORY: Nephew hx of prostate cancer s/p robotic prostatectomy      Vital Signs Last 24 Hrs  T(C): 36.8 (24 Dec 2019 05:42), Max: 37.2 (23 Dec 2019 21:27)  T(F): 98.3 (24 Dec 2019 05:42), Max: 98.9 (23 Dec 2019 21:27)  HR: 67 (24 Dec 2019 05:42) (67 - 87)  BP: 133/64 (24 Dec 2019 05:42) (124/78 - 142/67)  RR: 18 (24 Dec 2019 05:42) (18 - 18)    PHYSICAL EXAM:    Constitutional: NAD, well-developed  HEENT: bruise to bridge of nose   Back: No CVA tenderness  Respiratory: No accessory respiratory muscle use  Abd: Soft, NT/ND  no organomegally  no hernia  : NL M uncircumcised easily retractible no lesions testicles x 2 sym non tender no masses   QUIRINO: refused  Extremities: no edema  Neurological: A/O x 3  Psychiatric: Normal mood, normal affect  Skin: No rashes    I&O's Summary    23 Dec 2019 07:01  -  24 Dec 2019 07:00  --------------------------------------------------------  IN: 1000 mL / OUT: 4154 mL / NET: -3154 mL        LABS:                        12.1   10.00 )-----------( 199      ( 24 Dec 2019 06:05 )             35.3     12-24    136  |  96<L>  |  64<HH>  ----------------------------<  106<H>  4.1   |  24  |  2.1<H> down from 3.5 baseline 0.8    Ca    8.4<L>      24 Dec 2019 06:05  Mg     2.5     12-24    TPro  6.7  /  Alb  3.5  /  TBili  0.9  /  DBili  x   /  AST  13  /  ALT  <5  /  AlkPhos  82  12-24        RADIOLOGY & ADDITIONAL STUDIES:     CT Abdomen and Pelvis w/ Oral Cont (12.23.19 @ 23:26) >    EXAM:  CT ABDOMEN AND PELVIS OC            PROCEDURE DATE:  12/23/2019            INTERPRETATION:  CLINICAL STATEMENT: Pain. Evaluate for obstruction      TECHNIQUE: Contiguous axial CT images were obtained from the lower chest to the pubic symphysis without intravenous contrast.  Oral contrast was administered.  Reformatted images in the coronal and sagittal planes were acquired.    COMPARISON CT: December 19, 2019    OTHER STUDIES USED FOR CORRELATION: None.       FINDINGS:    LOWER CHEST: Cardiomegaly. Partially imaged pacer wires. Bibasilar subsegmental dependent atelectasis.    HEPATOBILIARY: Cholelithiasis. Unremarkable unenhanced liver.    SPLEEN: Unremarkable.    PANCREAS: Diffuse fatty atrophy.    ADRENAL GLANDS: Unremarkable.    KIDNEYS: New mild left hydroureter and periureteral fat stranding without evidence of an obstructing calculus. Unremarkable right kidney.    ABDOMINOPELVIC NODES: No lymphadenopathy.    PELVIC ORGANS: Urinary bladder decompressed with a Villavicencio catheter in place. Enlarged prostate gland.    PERITONEUM/MESENTERY/BOWEL: No bowel obstruction, ascites or pneumoperitoneum. Normal appendix. Sigmoid colonic anastomosis again noted.    BONES/SOFT TISSUES: Degenerative changes. Bilateral L5 pars defects with grade 1 anterolisthesis of L5 on S1.    OTHER: Scattered atherosclerotic vascular calcifications    IMPRESSION:   1.  Since December 19, 2019, new mild left hydroureter and periureteral fat stranding without evidence of an obstructing calculus. Findings may be secondary to a recently passed calculus versus an ascending urinary tract infection. Correlate with clinical and laboratory findings.    2.  No evidence of bowel obstruction.    3.  Cholelithiasis.                  HAILEE DIANE M.D., ATTENDING RADIOLOGIST  This document has been electronically signed. Dec 24 2019  8:30AM                < end of copied text > Patient is a 84y old  Male who presents with a chief complaint of Complete heart block (23 Dec 2019 15:17)      HPI:  The patient is a 84-year-old male with a PMH of HTN, DVT/atrial fibrillation? (on Eliquis), hypothyroidism, and intellectual disability who presented after a syncopal event today.  The patient is a poor historian so further history was obtained from his sister at bedside.  The patient was reportedly walking to the store when he lost consciousness.  He is unable to provide further details about his syncopal event.    In the ED, the patient was found to be bradycardic and with complete heart block on ECG.  A transvenous pacemaker was placed and his HR subsequently improved. (19 Dec 2019 17:53)    UROLOGY: Poor historian, medium to strong stream, frequency q 2 hrs, unsure of Nocturia #'s, + urgency with no incontinence, denies dysuria, hematuria, occasional hesitancy, no straining, voids in moderate amounts, no PV fullness or dribbling. Pt had Villavicencio placed for PEDRO with a PVR of 700 cc      PAST MEDICAL & SURGICAL HISTORY:  complete heart block s/p DDD pacemaker  Hypothyroidism  Hypertension  No significant past surgical history      REVIEW OF SYSTEMS:  [x] a 10 point review of systems was negative except where noted from the pts sister        MEDICATIONS  (STANDING):  amLODIPine   Tablet 10 milliGRAM(s) Oral daily  chlorhexidine 4% Liquid 1 Application(s) Topical <User Schedule>  heparin  Injectable 5000 Unit(s) SubCutaneous every 8 hours  influenza   Vaccine 0.5 milliLiter(s) IntraMuscular once  lactated ringers. 1000 milliLiter(s) (175 mL/Hr) IV Continuous <Continuous>  levothyroxine 25 MICROGram(s) Oral daily  pantoprazole    Tablet 40 milliGRAM(s) Oral before breakfast    MEDICATIONS  (PRN):  acetaminophen   Tablet .. 650 milliGRAM(s) Oral every 6 hours PRN Mild Pain (1 - 3)  acetaminophen  Suppository .. 650 milliGRAM(s) Rectal every 6 hours PRN Temp greater or equal to 38.5C (101.3F)  aluminum hydroxide/magnesium hydroxide/simethicone Suspension 30 milliLiter(s) Oral every 6 hours PRN Dyspepsia      Allergies    No Known Allergies    SOCIAL HISTORY: No illicit drug use    FAMILY HISTORY: Nephew hx of prostate cancer s/p robotic prostatectomy      Vital Signs Last 24 Hrs  T(C): 36.8 (24 Dec 2019 05:42), Max: 37.2 (23 Dec 2019 21:27)  T(F): 98.3 (24 Dec 2019 05:42), Max: 98.9 (23 Dec 2019 21:27)  HR: 67 (24 Dec 2019 05:42) (67 - 87)  BP: 133/64 (24 Dec 2019 05:42) (124/78 - 142/67)  RR: 18 (24 Dec 2019 05:42) (18 - 18)    PHYSICAL EXAM:    Constitutional: NAD, well-developed  HEENT: bruise to bridge of nose   Back: No CVA tenderness  Respiratory: No accessory respiratory muscle use  Abd: Soft, NT/ND  no organomegally  no hernia  : NL M uncircumcised easily retractible no lesions testicles x 2 sym non tender no masses   QUIRINO: refused  Extremities: no edema  Neurological: A/O x 3  Psychiatric: Normal mood, normal affect  Skin: No rashes    I&O's Summary    23 Dec 2019 07:01  -  24 Dec 2019 07:00  --------------------------------------------------------  IN: 1000 mL / OUT: 4154 mL / NET: -3154 mL        LABS:                        12.1   10.00 )-----------( 199      ( 24 Dec 2019 06:05 )             35.3     12-24    136  |  96<L>  |  64<HH>  ----------------------------<  106<H>  4.1   |  24  |  2.1<H> down from 3.5 baseline 0.8    Ca    8.4<L>      24 Dec 2019 06:05  Mg     2.5     12-24    TPro  6.7  /  Alb  3.5  /  TBili  0.9  /  DBili  x   /  AST  13  /  ALT  <5  /  AlkPhos  82  12-24        RADIOLOGY & ADDITIONAL STUDIES:     CT Abdomen and Pelvis w/ Oral Cont (12.23.19 @ 23:26) >    EXAM:  CT ABDOMEN AND PELVIS OC            PROCEDURE DATE:  12/23/2019            INTERPRETATION:  CLINICAL STATEMENT: Pain. Evaluate for obstruction      TECHNIQUE: Contiguous axial CT images were obtained from the lower chest to the pubic symphysis without intravenous contrast.  Oral contrast was administered.  Reformatted images in the coronal and sagittal planes were acquired.    COMPARISON CT: December 19, 2019    OTHER STUDIES USED FOR CORRELATION: None.       FINDINGS:    LOWER CHEST: Cardiomegaly. Partially imaged pacer wires. Bibasilar subsegmental dependent atelectasis.    HEPATOBILIARY: Cholelithiasis. Unremarkable unenhanced liver.    SPLEEN: Unremarkable.    PANCREAS: Diffuse fatty atrophy.    ADRENAL GLANDS: Unremarkable.    KIDNEYS: New mild left hydroureter and periureteral fat stranding without evidence of an obstructing calculus. Unremarkable right kidney.    ABDOMINOPELVIC NODES: No lymphadenopathy.    PELVIC ORGANS: Urinary bladder decompressed with a Villavicencio catheter in place. Enlarged prostate gland.    PERITONEUM/MESENTERY/BOWEL: No bowel obstruction, ascites or pneumoperitoneum. Normal appendix. Sigmoid colonic anastomosis again noted.    BONES/SOFT TISSUES: Degenerative changes. Bilateral L5 pars defects with grade 1 anterolisthesis of L5 on S1.    OTHER: Scattered atherosclerotic vascular calcifications    IMPRESSION:   1.  Since December 19, 2019, new mild left hydroureter and periureteral fat stranding without evidence of an obstructing calculus. Findings may be secondary to a recently passed calculus versus an ascending urinary tract infection. Correlate with clinical and laboratory findings.    2.  No evidence of bowel obstruction.    3.  Cholelithiasis.                  HAILEE DIANE M.D., ATTENDING RADIOLOGIST  This document has been electronically signed. Dec 24 2019  8:30AM                < end of copied text >

## 2019-12-24 NOTE — CONSULT NOTE ADULT - ASSESSMENT
Poor historian, medium to strong stream, frequency q 2 hrs, unsure of Nocturia #'s, + urgency with no incontinence, denies dysuria, hematuria, occasional hesitancy, no straining, voids in moderate amounts, no PV fullness or dribbling.  Pt had Villavicencio placed for PEDRO with a PVR of 700 cc    A) acute urinary retention with 700 cc PVR  PEDRO 2nd to retention  LUTS  s/p complete heart block s/p DDD pacemaker    P) Continue Villavicencio catheter 2nd to pt's over all mentation I don't think he will  tolerate CIC.  Start Flomax 0.4mg po daily if not contra indicated  increase ambulation, when fully ambulatory consider TOV after on Flomax for  3 days  renal sonogram to see if hydronephrosis resolves with drainage in 48 hrs  will d/w attending Poor historian, medium to strong stream, frequency q 2 hrs, unsure of Nocturia #'s, + urgency with no incontinence, denies dysuria, hematuria, occasional hesitancy, no straining, voids in moderate amounts, no PV fullness or dribbling.  Pt had Villavicencio placed for PEDRO with a PVR of 700 cc    A) acute urinary retention with 700 cc PVR  PEDRO 2nd to retention  LUTS  s/p complete heart block s/p DDD pacemaker  mild left hydro    P) Continue Villavicencio catheter 2nd as due to pt's over all mentation I don't think he will  tolerate CIC.  Start Flomax 0.4mg po daily if not contra indicated  increase ambulation, when fully ambulatory consider TOV after on Flomax for  3 days  renal sonogram to see if hydronephrosis resolves with drainage in 48 hrs  watch urine output and vitals as uo > 6 liters so far today and though ti is slowing it still averages +/- 200 cc hour

## 2019-12-24 NOTE — PHYSICAL THERAPY INITIAL EVALUATION ADULT - PLANNED THERAPY INTERVENTIONS, PT EVAL
strengthening/transfer training/balance training ROM/strengthening/transfer training/balance training

## 2019-12-24 NOTE — PROGRESS NOTE ADULT - ASSESSMENT
h/o Afib. recent heart block. syncope. decreased BP. PEDRO, secondary to above. Pt making urine. discussed with urology. renal function stable.

## 2019-12-24 NOTE — PROGRESS NOTE ADULT - ASSESSMENT
85 YO M with PMH of HTN, hypothyroidism, h/o DVT, ?Paroxysmal A.Fib on Eliquis, hypercoagulable state, intellectual disability, brought in by EMS after an episode of syncope.     Pending/update: CT of abdomen, has manzano now, PEDRO needs to improve, if contiously worsen will need urgent dialysis    #) SBO vs ileus  -< from: Xray Abdomen 2 Views (12.22.19 @ 16:09) >  Few mildly dilated loops of small bowel, with paucity of distal bowel gas, is nonspecific, possibly reflecting bowel obstruction versus ileus. No evidence of free intraperitoneal air. Osseous structures are stable.  - < from: CT Abdomen and Pelvis w/ Oral Cont (12.23.19 @ 23:26) >  IMPRESSION:   1.  Since December 19, 2019, new mild left hydroureter and periureteral fat stranding without evidence of an obstructing calculus. Findings may be secondary to a recently passed calculus versus an ascending urinary tract infection. Correlate with clinical and laboratory findings.  2.  No evidence of bowel obstruction.  3.  Cholelithiasis.  - c/w current management, patient had 2 good bowel movement, patient is much more alert and oriented, belly is less distended.    #) PEDRO >> imroving,   - creatinine originally trended up, but now trending down  - PEDRO secondary post-obstructive, which started improving after inserting manzano  - c/w IVF, rate increased to match the loss (75%of loss is being given)  - < from: US Kidney and Bladder (12.23.19 @ 12:08) >  Mild fullness of the right renal collecting system.  Prevoid volume of the urinary bladder was 370 stones. The patient did not want to urinate. Bladder walls are not thickened. There is no mass or debris.  - Nephrology recommended manzano catheter >   - PEDRO most likely post-obstructive   - monitor BMP daily  - in an event of PEDRO holding Eliquis, c/w heparin sc q8  - started flomax    #Syncope episode due to complete heart block  - S/p PPM placement 12/20  - Echo 12/20 LVEF 60%, Grade I Diastolic Dysfunction  - CTS following, no heavy lifting >10lbs or raising arm above shoulder level for 4-6 weeks, no submerging in water for 1 month, keep occlusive dressing intact- will be removed in cardiac surgery clinic by Dr. Zavala in 1 week  - EP following, no events on interrogation, follow up with Dr. Watson in 4-6 weeks  - Follow up PT/Phys eval    #HTN  - Continue Norvasc 10mg PO Daily  - Continue HCTZ 25mg PO Daily    #Afib  - will restart eliquis once kidney function improves    #Nasal bone fractures  #Right 7th rib fracture  - Per ENT, no acute intervention, continue bacitracin to wound on nasal bridge  - Continue incentive spirometry  - Continue Tylenol PRN pain    #Diet:npo  #GI Prophylaxis: Protonix 40mg PO Daily  #DVT Prophylaxis: holding eliquis, on heparin SC q8h  #Activity: Ambulate as tolerated  #Code Status: Full Code

## 2019-12-24 NOTE — PHYSICAL THERAPY INITIAL EVALUATION ADULT - GENERAL OBSERVATIONS, REHAB EVAL
Pt encountered semifowler in bed in NAD, + IV , +dressing L chest wall, manzano, agreeable to PT Eval with encouragement. 15:00-15:35.Pt encountered semifowler in bed in NAD, + IV , +dressing L chest wall, manzano, agreeable to PT Eval with encouragement.

## 2019-12-24 NOTE — PHYSICAL THERAPY INITIAL EVALUATION ADULT - PERTINENT HX OF CURRENT PROBLEM, REHAB EVAL
The patient is a 84-year-old male with a PMH of HTN, DVT/atrial fibrillation? (on Eliquis), hypothyroidism, and intellectual disability who presented after a syncopal event today, found to have complete heart block.

## 2019-12-25 LAB
ALBUMIN SERPL ELPH-MCNC: 3.2 G/DL — LOW (ref 3.5–5.2)
ALP SERPL-CCNC: 133 U/L — HIGH (ref 30–115)
ALT FLD-CCNC: 16 U/L — SIGNIFICANT CHANGE UP (ref 0–41)
ANION GAP SERPL CALC-SCNC: 16 MMOL/L — HIGH (ref 7–14)
AST SERPL-CCNC: 56 U/L — HIGH (ref 0–41)
BASOPHILS # BLD AUTO: 0.05 K/UL — SIGNIFICANT CHANGE UP (ref 0–0.2)
BASOPHILS NFR BLD AUTO: 0.7 % — SIGNIFICANT CHANGE UP (ref 0–1)
BILIRUB SERPL-MCNC: 0.6 MG/DL — SIGNIFICANT CHANGE UP (ref 0.2–1.2)
BUN SERPL-MCNC: 30 MG/DL — HIGH (ref 10–20)
CALCIUM SERPL-MCNC: 8.3 MG/DL — LOW (ref 8.5–10.1)
CHLORIDE SERPL-SCNC: 98 MMOL/L — SIGNIFICANT CHANGE UP (ref 98–110)
CO2 SERPL-SCNC: 24 MMOL/L — SIGNIFICANT CHANGE UP (ref 17–32)
CREAT SERPL-MCNC: 0.8 MG/DL — SIGNIFICANT CHANGE UP (ref 0.7–1.5)
EOSINOPHIL # BLD AUTO: 0.37 K/UL — SIGNIFICANT CHANGE UP (ref 0–0.7)
EOSINOPHIL NFR BLD AUTO: 5 % — SIGNIFICANT CHANGE UP (ref 0–8)
GLUCOSE SERPL-MCNC: 112 MG/DL — HIGH (ref 70–99)
HCT VFR BLD CALC: 33.8 % — LOW (ref 42–52)
HGB BLD-MCNC: 11.4 G/DL — LOW (ref 14–18)
IMM GRANULOCYTES NFR BLD AUTO: 2 % — HIGH (ref 0.1–0.3)
LYMPHOCYTES # BLD AUTO: 0.99 K/UL — LOW (ref 1.2–3.4)
LYMPHOCYTES # BLD AUTO: 13.3 % — LOW (ref 20.5–51.1)
MAGNESIUM SERPL-MCNC: 1.9 MG/DL — SIGNIFICANT CHANGE UP (ref 1.8–2.4)
MCHC RBC-ENTMCNC: 28.4 PG — SIGNIFICANT CHANGE UP (ref 27–31)
MCHC RBC-ENTMCNC: 33.7 G/DL — SIGNIFICANT CHANGE UP (ref 32–37)
MCV RBC AUTO: 84.1 FL — SIGNIFICANT CHANGE UP (ref 80–94)
MONOCYTES # BLD AUTO: 1.21 K/UL — HIGH (ref 0.1–0.6)
MONOCYTES NFR BLD AUTO: 16.3 % — HIGH (ref 1.7–9.3)
NEUTROPHILS # BLD AUTO: 4.67 K/UL — SIGNIFICANT CHANGE UP (ref 1.4–6.5)
NEUTROPHILS NFR BLD AUTO: 62.7 % — SIGNIFICANT CHANGE UP (ref 42.2–75.2)
NRBC # BLD: 0 /100 WBCS — SIGNIFICANT CHANGE UP (ref 0–0)
PLATELET # BLD AUTO: 188 K/UL — SIGNIFICANT CHANGE UP (ref 130–400)
POTASSIUM SERPL-MCNC: 3.9 MMOL/L — SIGNIFICANT CHANGE UP (ref 3.5–5)
POTASSIUM SERPL-SCNC: 3.9 MMOL/L — SIGNIFICANT CHANGE UP (ref 3.5–5)
PROT SERPL-MCNC: 5.9 G/DL — LOW (ref 6–8)
RBC # BLD: 4.02 M/UL — LOW (ref 4.7–6.1)
RBC # FLD: 14.1 % — SIGNIFICANT CHANGE UP (ref 11.5–14.5)
SODIUM SERPL-SCNC: 138 MMOL/L — SIGNIFICANT CHANGE UP (ref 135–146)
URATE SERPL-MCNC: 6 MG/DL — SIGNIFICANT CHANGE UP (ref 3.4–8.8)
WBC # BLD: 7.44 K/UL — SIGNIFICANT CHANGE UP (ref 4.8–10.8)
WBC # FLD AUTO: 7.44 K/UL — SIGNIFICANT CHANGE UP (ref 4.8–10.8)

## 2019-12-25 PROCEDURE — 99233 SBSQ HOSP IP/OBS HIGH 50: CPT

## 2019-12-25 PROCEDURE — 73600 X-RAY EXAM OF ANKLE: CPT | Mod: 26,LT

## 2019-12-25 RX ORDER — INDOMETHACIN 50 MG
25 CAPSULE ORAL ONCE
Refills: 0 | Status: COMPLETED | OUTPATIENT
Start: 2019-12-25 | End: 2019-12-25

## 2019-12-25 RX ORDER — APIXABAN 2.5 MG/1
5 TABLET, FILM COATED ORAL
Refills: 0 | Status: DISCONTINUED | OUTPATIENT
Start: 2019-12-25 | End: 2019-12-31

## 2019-12-25 RX ADMIN — Medication 650 MILLIGRAM(S): at 05:23

## 2019-12-25 RX ADMIN — Medication 25 MICROGRAM(S): at 05:25

## 2019-12-25 RX ADMIN — PANTOPRAZOLE SODIUM 40 MILLIGRAM(S): 20 TABLET, DELAYED RELEASE ORAL at 05:25

## 2019-12-25 RX ADMIN — APIXABAN 5 MILLIGRAM(S): 2.5 TABLET, FILM COATED ORAL at 17:02

## 2019-12-25 RX ADMIN — TAMSULOSIN HYDROCHLORIDE 0.4 MILLIGRAM(S): 0.4 CAPSULE ORAL at 21:16

## 2019-12-25 RX ADMIN — Medication 650 MILLIGRAM(S): at 05:53

## 2019-12-25 RX ADMIN — HEPARIN SODIUM 5000 UNIT(S): 5000 INJECTION INTRAVENOUS; SUBCUTANEOUS at 05:25

## 2019-12-25 RX ADMIN — SODIUM CHLORIDE 175 MILLILITER(S): 9 INJECTION, SOLUTION INTRAVENOUS at 02:47

## 2019-12-25 RX ADMIN — Medication 25 MILLIGRAM(S): at 12:24

## 2019-12-25 RX ADMIN — AMLODIPINE BESYLATE 10 MILLIGRAM(S): 2.5 TABLET ORAL at 05:25

## 2019-12-25 RX ADMIN — Medication 25 MILLIGRAM(S): at 12:54

## 2019-12-25 RX ADMIN — SODIUM CHLORIDE 175 MILLILITER(S): 9 INJECTION, SOLUTION INTRAVENOUS at 08:33

## 2019-12-25 NOTE — PROGRESS NOTE ADULT - SUBJECTIVE AND OBJECTIVE BOX
HIKARLY  [facility] T2-3A 015 A ([facility] T2-3A)            Patient was evaluated and examined  by bedside, c/o left ankle pain and swelling                REVIEW OF SYSTEMS:  please see pertinent positives mentioned above, all other 12 ROS negative      T(C): , Max: 37.3 (12-24-19 @ 20:36)  HR: 70 (12-25-19 @ 05:37)  BP: 146/67 (12-25-19 @ 05:37)  RR: 18 (12-24-19 @ 20:36)  SpO2: 95% (12-25-19 @ 05:37)  CAPILLARY BLOOD GLUCOSE          PHYSICAL EXAM:  General: NAD, AAOX3, patient is laying comfortably in bed  HEENT: AT, NC, Supple, NO JVD, NO CB  Lungs: CTA B/L, no wheezing, no rhonchi  CVS: normal S1, S2, RRR, NO M/G/R  Abdomen: soft, bowel sounds present, non-tender, mildly-distended  Extremities: left ankle edema and tenderness, no clubbing, no cyanosis, positive peripheral pulses b/l  Neuro: no acute focal neurological deficits, generalized body weakness , gait not tested  Skin: no rash, no ecchymosis      LAB  CBC  Date: 12-25-19 @ 06:48  Mean cell Faikfjzlwr70.4  Mean cell Hemoglobin Conc33.7  Mean cell Volum 84.1  Platelet count-Automate 188  RBC Count 4.02  Red Cell Distrib Width14.1  WBC Count7.44  % Albumin, Urine--  Hematocrit 33.8  Hemoglobin 11.4  CBC  Date: 12-24-19 @ 06:05  Mean cell Yzftteghzw70.1  Mean cell Hemoglobin Conc34.3  Mean cell Volum 81.9  Platelet count-Automate 199  RBC Count 4.31  Red Cell Distrib Width13.9  WBC Count10.00  % Albumin, Urine--  Hematocrit 35.3  Hemoglobin 12.1  CBC  Date: 12-23-19 @ 05:23  Mean cell Jxdyatxmci94.7  Mean cell Hemoglobin Conc33.1  Mean cell Volum 83.7  Platelet count-Automate 189  RBC Count 4.73  Red Cell Distrib Width14.0  WBC Count14.94  % Albumin, Urine--  Hematocrit 39.6  Hemoglobin 13.1  CBC  Date: 12-22-19 @ 06:22  Mean cell Jumzkjgcaz65.6  Mean cell Hemoglobin Conc33.1  Mean cell Volum 83.4  Platelet count-Automate 205  RBC Count 5.00  Red Cell Distrib Width14.0  WBC Count16.63  % Albumin, Urine--  Hematocrit 41.7  Hemoglobin 13.8  CBC  Date: 12-21-19 @ 05:21  Mean cell Ktzzwlokrw31.3  Mean cell Hemoglobin Conc33.8  Mean cell Volum 83.7  Platelet count-Automate 204  RBC Count 4.84  Red Cell Distrib Width13.6  WBC Count11.23  % Albumin, Urine--  Hematocrit 40.5  Hemoglobin 13.7  CBC  Date: 12-20-19 @ 04:07  Mean cell Xfgkepxioi36.0  Mean cell Hemoglobin Conc32.8  Mean cell Volum 85.4  Platelet count-Automate 168  RBC Count 4.32  Red Cell Distrib Width13.8  WBC Count7.60  % Albumin, Urine--  Hematocrit 36.9  Hemoglobin 12.1  CBC  Date: 12-19-19 @ 14:32  Mean cell Gyourebguw13.1  Mean cell Hemoglobin Conc32.7  Mean cell Volum 86.1  Platelet count-Automate 214  RBC Count 4.59  Red Cell Distrib Width13.9  WBC Count9.99  % Albumin, Urine--  Hematocrit 39.5  Hemoglobin 12.9    [facility]  12-25-19 @ 06:48  Blood Gas Arterial-Calcium,Ionized--  Blood Urea Nitrogen, Serum 30 mg/dL<H> [10 - 20]  Carbon Dioxide, Serum24 mmol/L [17 - 32]  Chloride, Serum98 mmol/L [98 - 110]  Creatinie, Serum0.8 mg/dL [0.7 - 1.5]  Glucose, Vyhes595 mg/dL<H> [70 - 99]  Potassium, Serum3.9 mmol/L [3.5 - 5.0]  Sodium, Serum 138 mmol/L [135 - 146]  [facility]  12-24-19 @ 22:43  Blood Gas Arterial-Calcium,Ionized--  Blood Urea Nitrogen, Serum 40 mg/dL<H> [10 - 20]  Carbon Dioxide, Serum23 mmol/L [17 - 32]  Chloride, Serum99 mmol/L [98 - 110]  Creatinie, Serum1.1 mg/dL [0.7 - 1.5]  Glucose, Nxfbs383 mg/dL<H> [70 - 99]  Potassium, Serum3.9 mmol/L [3.5 - 5.0]  Sodium, Serum 137 mmol/L [135 - 146]  [facility]  12-24-19 @ 18:36  Blood Gas Arterial-Calcium,Ionized--  Blood Urea Nitrogen, Serum 42 mg/dL<H> [10 - 20]  Carbon Dioxide, Serum23 mmol/L [17 - 32]  Chloride, Serum96 mmol/L<L> [98 - 110]  Creatinie, Serum1.2 mg/dL [0.7 - 1.5]  Glucose, Nazuq266 mg/dL<H> [70 - 99]  Potassium, Serum4.3 mmol/L [3.5 - 5.0]  Sodium, Serum 135 mmol/L [135 - 146]  [facility]  12-24-19 @ 06:05  Blood Gas Arterial-Calcium,Ionized--  Blood Urea Nitrogen, Serum 64 mg/dL<HH> [10 - 20] [Critical value:]  Carbon Dioxide, Serum24 mmol/L [17 - 32]  Chloride, Serum96 mmol/L<L> [98 - 110]  Creatinie, Serum2.1 mg/dL<H> [0.7 - 1.5]  Glucose, Hqxoi680 mg/dL<H> [70 - 99]  Potassium, Serum4.1 mmol/L [3.5 - 5.0]  Sodium, Serum 136 mmol/L [135 - 146]  [facility]  12-23-19 @ 22:04  Blood Gas Arterial-Calcium,Ionized--  Blood Urea Nitrogen, Serum 77 mg/dL<HH> [10 - 20] [Critical value:]  Carbon Dioxide, Serum18 mmol/L [17 - 32]  Chloride, Serum94 mmol/L<L> [98 - 110]  Creatinie, Serum3.5 mg/dL<H> [0.7 - 1.5]  Glucose, Heccw312 mg/dL<H> [70 - 99]  Potassium, Serum4.2 mmol/L [3.5 - 5.0]  Sodium, Serum 132 mmol/L<L> [135 - 146]  [facility]  12-23-19 @ 05:23  Blood Gas Arterial-Calcium,Ionized--  Blood Urea Nitrogen, Serum 88 mg/dL<HH> [10 - 20] [Critical value:]  Carbon Dioxide, Serum17 mmol/L [17 - 32]  Chloride, Serum94 mmol/L<L> [98 - 110]  Creatinie, Serum5.5 mg/dL<HH> [0.7 - 1.5] [Critical value:]  Glucose, Fzcfg289 mg/dL<H> [70 - 99]  Potassium, Serum4.8 mmol/L [3.5 - 5.0]  Sodium, Serum 135 mmol/L [135 - 146]  [facility]  12-22-19 @ 06:22  Blood Gas Arterial-Calcium,Ionized--  Blood Urea Nitrogen, Serum 55 mg/dL<H> [10 - 20]  Carbon Dioxide, Serum19 mmol/L [17 - 32]  Chloride, Serum97 mmol/L<L> [98 - 110]  Creatinie, Serum4.2 mg/dL<HH> [0.7 - 1.5] [Critical value:]  Glucose, Tlyqx047 mg/dL<H> [70 - 99]  Potassium, Serum4.3 mmol/L [3.5 - 5.0]  Sodium, Serum 138 mmol/L [135 - 146]  [facility]  12-21-19 @ 05:21  Blood Gas Arterial-Calcium,Ionized--  Blood Urea Nitrogen, Serum 27 mg/dL<H> [10 - 20]  Carbon Dioxide, Serum21 mmol/L [17 - 32]  Chloride, Tfskh979 mmol/L [98 - 110]  Creatinie, Serum2.3 mg/dL<H> [0.7 - 1.5]  Glucose, Uyuyl393 mg/dL<H> [70 - 99]  Potassium, Serum4.3 mmol/L [3.5 - 5.0]  Sodium, Serum 139 mmol/L [135 - 146]            Medications:  acetaminophen   Tablet .. 650 milliGRAM(s) Oral every 6 hours PRN  acetaminophen  Suppository .. 650 milliGRAM(s) Rectal every 6 hours PRN  aluminum hydroxide/magnesium hydroxide/simethicone Suspension 30 milliLiter(s) Oral every 6 hours PRN  amLODIPine   Tablet 10 milliGRAM(s) Oral daily  apixaban 5 milliGRAM(s) Oral two times a day  chlorhexidine 4% Liquid 1 Application(s) Topical <User Schedule>  indomethacin 25 milliGRAM(s) Oral once  influenza   Vaccine 0.5 milliLiter(s) IntraMuscular once  levothyroxine 25 MICROGram(s) Oral daily  pantoprazole    Tablet 40 milliGRAM(s) Oral before breakfast  tamsulosin 0.4 milliGRAM(s) Oral at bedtime        Assessment and Plan:  83 YO M with PMH of HTN, hypothyroidism, h/o DVT, ?Paroxysmal A.Fib on Eliquis, hypercoagulable state, intellectual disability, brought in by EMS after an episode of syncope.     syncope episode due to complete heart block sp PPM placement:   sp PPM placement.  CTS signed off.    EP followed up and signed off.   as per EP, patient to wear sling to left arm to not move L arm above shoulder level as patient participates with PT to avoid leads displacement.  on 12/25/19 patient was restarted on  eliquis tx.  TTE noted: normal EF with grade 1 diastolic dysfunction.     Acute renal failure possibly postobstructive and pre-renal due to poor oral intake vs contrast induced injury:   renal function normalized. d/c IVF  continue holding HCTZ for next 2 days.  CT abdomen/pelvis noted - mild left hydroureter   Nephrology consult appreciated.     mild left hydroureter with abdominal distension/no abdominal bowel obstruction:    distension better after manzano palcement.    as per urology   started on  flomax   TOV after 3 days of flomax and patient more ambulatory( tomorrow)    Acute left Ankle pain and edema- ? gout vs. pseudogout flare- obtain 3 view of left ankle x ray, give one dose of indomethacin, f/up uric acid level         h/o ?A-FIB / ?DVT with h/o hypercoagulable state:    restarted on   eliquis 5 mg bid   repeat LE doppler shows no DVT.     cholelithiasis :   stable / no cholecystitis   fu surgery as OP for elective removal of GB    leukocytosis unclear etiology:   WBC improved significantly   afebrile.      HTN:   BP stable  c/w norvasc/dced  HCTZ       nasal bone fractures:  denies pain for now  c/w tylenol prn.   ENT evaluation appreciated- no intervention.    Right 7th rib fracture  does not complaint of pain/ tylenol prn.   encourage use of incentive spirometry        PT/ physiatry eval and tx.     #Progress Note Handoff: Pending X rays of left ankle for new onset left ankle pain eval, restarted on eliquis today, TOV tomorrow.   Family discussion: yes Disposition: possible STR in 24 hours

## 2019-12-25 NOTE — PROGRESS NOTE ADULT - SUBJECTIVE AND OBJECTIVE BOX
83 y/o male with urinary retention. Developed post obstructive diuresis s/p manzano cath placement. Seen and examined at bedside doing well without any complaints. Improved current UO at 70ml/hr. Renal function improved.     PAST MEDICAL & SURGICAL HISTORY:  Hypothyroidism  Hypertension  No significant past surgical history    REVIEW OF SYSTEMS:    CONSTITUTIONAL:  No fevers or chills  HEENT: No visual changes  ENDO: No sweating  NECK: No pain or stiffness  MUSCULOSKELETAL: No back pain, no joint pain  RESPIRATORY: No shortness of breath  CARDIOVASCULAR: No chest pain  GASTROINTESTINAL: No abdominal or epigastric pain. No nausea, vomiting,  No diarrhea or constipation.   NEUROLOGICAL: No mental status changes  PSYCH: No depression, no mood changes  SKIN: No itching    MEDICATIONS  (STANDING):  amLODIPine   Tablet 10 milliGRAM(s) Oral daily  chlorhexidine 4% Liquid 1 Application(s) Topical <User Schedule>  heparin  Injectable 5000 Unit(s) SubCutaneous every 8 hours  influenza   Vaccine 0.5 milliLiter(s) IntraMuscular once  lactated ringers. 1000 milliLiter(s) (175 mL/Hr) IV Continuous <Continuous>  levothyroxine 25 MICROGram(s) Oral daily  pantoprazole    Tablet 40 milliGRAM(s) Oral before breakfast  tamsulosin 0.4 milliGRAM(s) Oral at bedtime    MEDICATIONS  (PRN):  acetaminophen   Tablet .. 650 milliGRAM(s) Oral every 6 hours PRN Mild Pain (1 - 3)  acetaminophen  Suppository .. 650 milliGRAM(s) Rectal every 6 hours PRN Temp greater or equal to 38.5C (101.3F)  aluminum hydroxide/magnesium hydroxide/simethicone Suspension 30 milliLiter(s) Oral every 6 hours PRN Dyspepsia    Allergies: No Known Allergies    Vital Signs Last 24 Hrs  T(C): 37.3 (24 Dec 2019 20:36), Max: 37.3 (24 Dec 2019 20:36)  T(F): 99.2 (24 Dec 2019 20:36), Max: 99.2 (24 Dec 2019 20:36)  HR: 70 (25 Dec 2019 05:37) (70 - 79)  BP: 146/67 (25 Dec 2019 05:37) (116/62 - 146/67)  BP(mean): --  RR: 18 (24 Dec 2019 20:36) (18 - 18)  SpO2: 95% (25 Dec 2019 05:37) (95% - 95%)    PHYSICAL EXAM:    Constitutional: NAD, well-developed, awake/alert  HEENT: EOMI  Neck: no pain  Back: No CVA tenderness B/L  Respiratory: No accessory respiratory muscle use  Abd: Soft, NT/ND, bladder non palpable, no suprapubic tenderness  : manzano cath draining clear yellow urine  Extremities: no edema  Neurological: A/O x 3  Psychiatric: Normal mood, normal affect      I&O's Summary    24 Dec 2019 07:01  -  25 Dec 2019 07:00  --------------------------------------------------------  IN: 2650 mL / OUT: 4000 mL / NET: -1350 mL        LABS:                        11.4   7.44  )-----------( 188      ( 25 Dec 2019 06:48 )             33.8         138  |  98  |  30<H>  ----------------------------<  112<H>  3.9   |  24  |  0.8    Ca    8.3<L>      25 Dec 2019 06:48  Mg     1.9         TPro  5.9<L>  /  Alb  3.2<L>  /  TBili  0.6  /  DBili  x   /  AST  56<H>  /  ALT  16  /  AlkPhos  133<H>  -      Urinalysis Basic - ( 24 Dec 2019 09:06 )    Color: Yellow / Appearance: Turbid / S.016 / pH: x  Gluc: x / Ketone: Negative  / Bili: Negative / Urobili: <2 mg/dL   Blood: x / Protein: 30 mg/dL / Nitrite: Negative   Leuk Esterase: Small / RBC: 103 /HPF / WBC 18 /HPF   Sq Epi: x / Non Sq Epi: 2 /HPF / Bacteria: Negative

## 2019-12-25 NOTE — PROGRESS NOTE ADULT - ASSESSMENT
85 y/o male with urinary retention and post obstructive diuresis  - Cont monitoring UO q4  - Continue alpha blockers  - Can decrease or d/c IV fluids if not medically warranted, encourage oral fluid supplementation  - TOV, if fails CIC q6-8 hrs for PVR >300cc  - Renal function improved to baseline  - Recall  prn  - Discussed with Dr. Whiteside

## 2019-12-25 NOTE — PROGRESS NOTE ADULT - SUBJECTIVE AND OBJECTIVE BOX
[facility] FOLLOW UP NOTE  --------------------------------------------------------------------------------  Chief Complaint:    24 hour events/subjective:        PAST HISTORY  --------------------------------------------------------------------------------  No significant changes to PMH, PSH, FHx, SHx, unless otherwise noted    ALLERGIES & MEDICATIONS  --------------------------------------------------------------------------------  Allergies    No Known Allergies    Intolerances      Standing Inpatient Medications  amLODIPine   Tablet 10 milliGRAM(s) Oral daily  apixaban 5 milliGRAM(s) Oral two times a day  chlorhexidine 4% Liquid 1 Application(s) Topical <User Schedule>  indomethacin 25 milliGRAM(s) Oral once  influenza   Vaccine 0.5 milliLiter(s) IntraMuscular once  levothyroxine 25 MICROGram(s) Oral daily  pantoprazole    Tablet 40 milliGRAM(s) Oral before breakfast  tamsulosin 0.4 milliGRAM(s) Oral at bedtime    PRN Inpatient Medications  acetaminophen   Tablet .. 650 milliGRAM(s) Oral every 6 hours PRN  acetaminophen  Suppository .. 650 milliGRAM(s) Rectal every 6 hours PRN  aluminum hydroxide/magnesium hydroxide/simethicone Suspension 30 milliLiter(s) Oral every 6 hours PRN      REVIEW OF SYSTEMS  --------------------------------------------------------------------------------  Gen: No weight changes, fatigue, fevers/chills, weakness  Skin: No rashes  Head/Eyes/Ears/Mouth: No headache; Normal hearing; Normal vision w/o blurriness; No sinus pain/discomfort, sore throat  Respiratory: No dyspnea, cough, wheezing, hemoptysis  CV: No chest pain, PND, orthopnea  GI: No abdominal pain, diarrhea, constipation, nausea, vomiting, melena, hematochezia  : No increased frequency, dysuria, hematuria, nocturia  MSK: No joint pain/swelling; no back pain; no edema  Neuro: No dizziness/lightheadedness, weakness, seizures, numbness, tingling  Heme: No easy bruising or bleeding  Endo: No heat/cold intolerance  Psych: No significant nervousness, anxiety, stress, depression    All other systems were reviewed and are negative, except as noted.    VITALS/PHYSICAL EXAM  --------------------------------------------------------------------------------  T(C): 37.3 (12-24-19 @ 20:36), Max: 37.3 (12-24-19 @ 20:36)  HR: 70 (12-25-19 @ 05:37) (70 - 79)  BP: 146/67 (12-25-19 @ 05:37) (116/62 - 146/67)  RR: 18 (12-24-19 @ 20:36) (18 - 18)  SpO2: 95% (12-25-19 @ 05:37) (95% - 95%)  Wt(kg): --        12-24-19 @ 07:01  -  12-25-19 @ 07:00  --------------------------------------------------------  IN: 2650 mL / OUT: 4000 mL / NET: -1350 mL    12-25-19 @ 07:01  -  12-25-19 @ 10:39  --------------------------------------------------------  IN: 500 mL / OUT: 1350 mL / NET: -850 mL      Physical Exam:  	Gen: NAD, well-appearing  	HEENT: PERRL, supple neck, clear oropharynx  	Pulm: CTA B/L  	CV: RRR, S1S2; no rub  	Back: No spinal or CVA tenderness; no sacral edema  	Abd: +BS, soft, nontender/nondistended  	: No suprapubic tenderness  	UE: Warm, FROM, no clubbing, intact strength; no edema; no asterixis  	LE: Warm, FROM, no clubbing, intact strength; no edema  	Neuro: No focal deficits, intact gait  	Psych: Normal affect and mood  	Skin: Warm, without rashes  	Vascular access:    LABS/STUDIES  --------------------------------------------------------------------------------              11.4   7.44  >-----------<  188      [12-25-19 @ 06:48]              33.8     138  |  98  |  30  ----------------------------<  112      [12-25-19 @ 06:48]  3.9   |  24  |  0.8        Ca     8.3     [12-25-19 @ 06:48]      Mg     1.9     [12-25-19 @ 06:48]    TPro  5.9  /  Alb  3.2  /  TBili  0.6  /  DBili  x   /  AST  56  /  ALT  16  /  AlkPhos  133  [12-25-19 @ 06:48]          Creatinine Trend:  SCr 0.8 [12-25 @ 06:48]  SCr 1.1 [12-24 @ 22:43]  SCr 1.2 [12-24 @ 18:36]  SCr 2.1 [12-24 @ 06:05]  SCr 3.5 [12-23 @ 22:04]    Urinalysis - [12-24-19 @ 09:06]      Color Yellow / Appearance Turbid / SG 1.016 / pH 6.0      Gluc Negative / Ketone Negative  / Bili Negative / Urobili <2 mg/dL       Blood Moderate / Protein 30 mg/dL / Leuk Est Small / Nitrite Negative       / WBC 18 / Hyaline 6 / Gran  / Sq Epi  / Non Sq Epi 2 / Bacteria Negative      TSH 2.40      [12-20-19 @ 04:07]

## 2019-12-26 LAB
ALBUMIN SERPL ELPH-MCNC: 2.9 G/DL — LOW (ref 3.5–5.2)
ALP SERPL-CCNC: 168 U/L — HIGH (ref 30–115)
ALT FLD-CCNC: 37 U/L — SIGNIFICANT CHANGE UP (ref 0–41)
ANION GAP SERPL CALC-SCNC: 13 MMOL/L — SIGNIFICANT CHANGE UP (ref 7–14)
ANISOCYTOSIS BLD QL: SLIGHT — SIGNIFICANT CHANGE UP
AST SERPL-CCNC: 78 U/L — HIGH (ref 0–41)
BASOPHILS # BLD AUTO: 0.07 K/UL — SIGNIFICANT CHANGE UP (ref 0–0.2)
BASOPHILS NFR BLD AUTO: 0.9 % — SIGNIFICANT CHANGE UP (ref 0–1)
BILIRUB SERPL-MCNC: 0.6 MG/DL — SIGNIFICANT CHANGE UP (ref 0.2–1.2)
BUN SERPL-MCNC: 21 MG/DL — HIGH (ref 10–20)
CALCIUM SERPL-MCNC: 8.4 MG/DL — LOW (ref 8.5–10.1)
CHLORIDE SERPL-SCNC: 99 MMOL/L — SIGNIFICANT CHANGE UP (ref 98–110)
CO2 SERPL-SCNC: 26 MMOL/L — SIGNIFICANT CHANGE UP (ref 17–32)
CREAT SERPL-MCNC: 0.8 MG/DL — SIGNIFICANT CHANGE UP (ref 0.7–1.5)
CULTURE RESULTS: NO GROWTH — SIGNIFICANT CHANGE UP
EOSINOPHIL # BLD AUTO: 0.5 K/UL — SIGNIFICANT CHANGE UP (ref 0–0.7)
EOSINOPHIL NFR BLD AUTO: 6.1 % — SIGNIFICANT CHANGE UP (ref 0–8)
GIANT PLATELETS BLD QL SMEAR: PRESENT — SIGNIFICANT CHANGE UP
GLUCOSE SERPL-MCNC: 107 MG/DL — HIGH (ref 70–99)
HCT VFR BLD CALC: 34.2 % — LOW (ref 42–52)
HGB BLD-MCNC: 11.1 G/DL — LOW (ref 14–18)
LYMPHOCYTES # BLD AUTO: 0.5 K/UL — LOW (ref 1.2–3.4)
LYMPHOCYTES # BLD AUTO: 6.1 % — LOW (ref 20.5–51.1)
MAGNESIUM SERPL-MCNC: 1.9 MG/DL — SIGNIFICANT CHANGE UP (ref 1.8–2.4)
MANUAL SMEAR VERIFICATION: SIGNIFICANT CHANGE UP
MCHC RBC-ENTMCNC: 27.7 PG — SIGNIFICANT CHANGE UP (ref 27–31)
MCHC RBC-ENTMCNC: 32.5 G/DL — SIGNIFICANT CHANGE UP (ref 32–37)
MCV RBC AUTO: 85.3 FL — SIGNIFICANT CHANGE UP (ref 80–94)
MONOCYTES # BLD AUTO: 0.92 K/UL — HIGH (ref 0.1–0.6)
MONOCYTES NFR BLD AUTO: 11.3 % — HIGH (ref 1.7–9.3)
MYELOCYTES NFR BLD: 1.7 % — HIGH (ref 0–0)
NEUTROPHILS # BLD AUTO: 5.59 K/UL — SIGNIFICANT CHANGE UP (ref 1.4–6.5)
NEUTROPHILS NFR BLD AUTO: 68.7 % — SIGNIFICANT CHANGE UP (ref 42.2–75.2)
PLAT MORPH BLD: NORMAL — SIGNIFICANT CHANGE UP
PLATELET # BLD AUTO: 192 K/UL — SIGNIFICANT CHANGE UP (ref 130–400)
POLYCHROMASIA BLD QL SMEAR: SLIGHT — SIGNIFICANT CHANGE UP
POTASSIUM SERPL-MCNC: 4.4 MMOL/L — SIGNIFICANT CHANGE UP (ref 3.5–5)
POTASSIUM SERPL-SCNC: 4.4 MMOL/L — SIGNIFICANT CHANGE UP (ref 3.5–5)
PROMYELOCYTES # FLD: 0.9 % — HIGH (ref 0–0)
PROT SERPL-MCNC: 5.9 G/DL — LOW (ref 6–8)
RBC # BLD: 4.01 M/UL — LOW (ref 4.7–6.1)
RBC # FLD: 14.2 % — SIGNIFICANT CHANGE UP (ref 11.5–14.5)
RBC BLD AUTO: NORMAL — SIGNIFICANT CHANGE UP
SODIUM SERPL-SCNC: 138 MMOL/L — SIGNIFICANT CHANGE UP (ref 135–146)
SPECIMEN SOURCE: SIGNIFICANT CHANGE UP
VARIANT LYMPHS # BLD: 4.3 % — SIGNIFICANT CHANGE UP (ref 0–5)
WBC # BLD: 8.14 K/UL — SIGNIFICANT CHANGE UP (ref 4.8–10.8)
WBC # FLD AUTO: 8.14 K/UL — SIGNIFICANT CHANGE UP (ref 4.8–10.8)

## 2019-12-26 PROCEDURE — 99233 SBSQ HOSP IP/OBS HIGH 50: CPT

## 2019-12-26 PROCEDURE — 76775 US EXAM ABDO BACK WALL LIM: CPT | Mod: 26

## 2019-12-26 PROCEDURE — 99232 SBSQ HOSP IP/OBS MODERATE 35: CPT

## 2019-12-26 RX ADMIN — APIXABAN 5 MILLIGRAM(S): 2.5 TABLET, FILM COATED ORAL at 05:31

## 2019-12-26 RX ADMIN — PANTOPRAZOLE SODIUM 40 MILLIGRAM(S): 20 TABLET, DELAYED RELEASE ORAL at 05:31

## 2019-12-26 RX ADMIN — Medication 25 MICROGRAM(S): at 05:31

## 2019-12-26 RX ADMIN — AMLODIPINE BESYLATE 10 MILLIGRAM(S): 2.5 TABLET ORAL at 05:31

## 2019-12-26 RX ADMIN — APIXABAN 5 MILLIGRAM(S): 2.5 TABLET, FILM COATED ORAL at 19:52

## 2019-12-26 RX ADMIN — TAMSULOSIN HYDROCHLORIDE 0.4 MILLIGRAM(S): 0.4 CAPSULE ORAL at 21:42

## 2019-12-26 NOTE — PROGRESS NOTE ADULT - SUBJECTIVE AND OBJECTIVE BOX
HI KARLY  [facility] T2-3A 015 A ([facility] T2-3A)            Patient was evaluated and examined  by bedside, reports left ankle pain has decreased, tolerating diet well, no fever          REVIEW OF SYSTEMS:  please see pertinent positives mentioned above, all other 12 ROS negative      T(C): , Max: 36.8 (12-25-19 @ 21:29)  HR: 68 (12-26-19 @ 04:32)  BP: 126/59 (12-26-19 @ 04:32)  RR: 18 (12-26-19 @ 04:32)  SpO2: --  CAPILLARY BLOOD GLUCOSE          PHYSICAL EXAM:  General: NAD, AAOX3, patient is laying comfortably in bed  HEENT: AT, NC, Supple, NO JVD, NO CB  Lungs: CTA B/L, no wheezing, no rhonchi  CVS: normal S1, S2, RRR, NO M/G/R  Abdomen: soft, bowel sounds present, non-tender, mildly-distended  Extremities: left ankle edema  decreased, no clubbing, no cyanosis, positive peripheral pulses b/l  Neuro: no acute focal neurological deficits, generalized body weakness , gait not tested  Skin: no rash, no ecchymosis        LAB  CBC  Date: 12-26-19 @ 06:40  Mean cell Zbuvcqcche45.7  Mean cell Hemoglobin Conc32.5  Mean cell Volum 85.3  Platelet count-Automate 192  RBC Count 4.01  Red Cell Distrib Width14.2  WBC Count8.14  % Albumin, Urine--  Hematocrit 34.2  Hemoglobin 11.1  CBC  Date: 12-25-19 @ 06:48  Mean cell Clriwqfpfd89.4  Mean cell Hemoglobin Conc33.7  Mean cell Volum 84.1  Platelet count-Automate 188  RBC Count 4.02  Red Cell Distrib Width14.1  WBC Count7.44  % Albumin, Urine--  Hematocrit 33.8  Hemoglobin 11.4  CBC  Date: 12-24-19 @ 06:05  Mean cell Pqofutixis00.1  Mean cell Hemoglobin Conc34.3  Mean cell Volum 81.9  Platelet count-Automate 199  RBC Count 4.31  Red Cell Distrib Width13.9  WBC Count10.00  % Albumin, Urine--  Hematocrit 35.3  Hemoglobin 12.1  CBC  Date: 12-23-19 @ 05:23  Mean cell Pwfewwdshx20.7  Mean cell Hemoglobin Conc33.1  Mean cell Volum 83.7  Platelet count-Automate 189  RBC Count 4.73  Red Cell Distrib Width14.0  WBC Count14.94  % Albumin, Urine--  Hematocrit 39.6  Hemoglobin 13.1  CBC  Date: 12-22-19 @ 06:22  Mean cell Qbczhnzblk68.6  Mean cell Hemoglobin Conc33.1  Mean cell Volum 83.4  Platelet count-Automate 205  RBC Count 5.00  Red Cell Distrib Width14.0  WBC Count16.63  % Albumin, Urine--  Hematocrit 41.7  Hemoglobin 13.8  CBC  Date: 12-21-19 @ 05:21  Mean cell Rwunkhzqnf82.3  Mean cell Hemoglobin Conc33.8  Mean cell Volum 83.7  Platelet count-Automate 204  RBC Count 4.84  Red Cell Distrib Width13.6  WBC Count11.23  % Albumin, Urine--  Hematocrit 40.5  Hemoglobin 13.7  CBC  Date: 12-20-19 @ 04:07  Mean cell Vbkbzjehha44.0  Mean cell Hemoglobin Conc32.8  Mean cell Volum 85.4  Platelet count-Automate 168  RBC Count 4.32  Red Cell Distrib Width13.8  WBC Count7.60  % Albumin, Urine--  Hematocrit 36.9  Hemoglobin 12.1  CBC  Date: 12-19-19 @ 14:32  Mean cell Yajkrwhjxt54.1  Mean cell Hemoglobin Conc32.7  Mean cell Volum 86.1  Platelet count-Automate 214  RBC Count 4.59  Red Cell Distrib Width13.9  WBC Count9.99  % Albumin, Urine--  Hematocrit 39.5  Hemoglobin 12.9    [facility]  12-26-19 @ 06:40  Blood Gas Arterial-Calcium,Ionized--  Blood Urea Nitrogen, Serum 21 mg/dL<H> [10 - 20]  Carbon Dioxide, Serum26 mmol/L [17 - 32]  Chloride, Serum99 mmol/L [98 - 110]  Creatinie, Serum0.8 mg/dL [0.7 - 1.5]  Glucose, Ivqyw067 mg/dL<H> [70 - 99]  Potassium, Serum4.4 mmol/L [3.5 - 5.0]  Sodium, Serum 138 mmol/L [135 - 146]  [facility]  12-25-19 @ 06:48  Blood Gas Arterial-Calcium,Ionized--  Blood Urea Nitrogen, Serum 30 mg/dL<H> [10 - 20]  Carbon Dioxide, Serum24 mmol/L [17 - 32]  Chloride, Serum98 mmol/L [98 - 110]  Creatinie, Serum0.8 mg/dL [0.7 - 1.5]  Glucose, Xvqbh783 mg/dL<H> [70 - 99]  Potassium, Serum3.9 mmol/L [3.5 - 5.0]  Sodium, Serum 138 mmol/L [135 - 146]  [facility]  12-24-19 @ 22:43  Blood Gas Arterial-Calcium,Ionized--  Blood Urea Nitrogen, Serum 40 mg/dL<H> [10 - 20]  Carbon Dioxide, Serum23 mmol/L [17 - 32]  Chloride, Serum99 mmol/L [98 - 110]  Creatinie, Serum1.1 mg/dL [0.7 - 1.5]  Glucose, Tlowj855 mg/dL<H> [70 - 99]  Potassium, Serum3.9 mmol/L [3.5 - 5.0]  Sodium, Serum 137 mmol/L [135 - 146]  [facility]  12-24-19 @ 18:36  Blood Gas Arterial-Calcium,Ionized--  Blood Urea Nitrogen, Serum 42 mg/dL<H> [10 - 20]  Carbon Dioxide, Serum23 mmol/L [17 - 32]  Chloride, Serum96 mmol/L<L> [98 - 110]  Creatinie, Serum1.2 mg/dL [0.7 - 1.5]  Glucose, Fyjly104 mg/dL<H> [70 - 99]  Potassium, Serum4.3 mmol/L [3.5 - 5.0]  Sodium, Serum 135 mmol/L [135 - 146]  [facility]  12-24-19 @ 06:05  Blood Gas Arterial-Calcium,Ionized--  Blood Urea Nitrogen, Serum 64 mg/dL<HH> [10 - 20] [Critical value:]  Carbon Dioxide, Serum24 mmol/L [17 - 32]  Chloride, Serum96 mmol/L<L> [98 - 110]  Creatinie, Serum2.1 mg/dL<H> [0.7 - 1.5]  Glucose, Lazfi747 mg/dL<H> [70 - 99]  Potassium, Serum4.1 mmol/L [3.5 - 5.0]  Sodium, Serum 136 mmol/L [135 - 146]  [facility]  12-23-19 @ 22:04  Blood Gas Arterial-Calcium,Ionized--  Blood Urea Nitrogen, Serum 77 mg/dL<HH> [10 - 20] [Critical value:]  Carbon Dioxide, Serum18 mmol/L [17 - 32]  Chloride, Serum94 mmol/L<L> [98 - 110]  Creatinie, Serum3.5 mg/dL<H> [0.7 - 1.5]  Glucose, Ktduf694 mg/dL<H> [70 - 99]  Potassium, Serum4.2 mmol/L [3.5 - 5.0]  Sodium, Serum 132 mmol/L<L> [135 - 146]  [facility]  12-23-19 @ 05:23  Blood Gas Arterial-Calcium,Ionized--  Blood Urea Nitrogen, Serum 88 mg/dL<HH> [10 - 20] [Critical value:]  Carbon Dioxide, Serum17 mmol/L [17 - 32]  Chloride, Serum94 mmol/L<L> [98 - 110]  Creatinie, Serum5.5 mg/dL<HH> [0.7 - 1.5] [Critical value:]  Glucose, Lxazx673 mg/dL<H> [70 - 99]  Potassium, Serum4.8 mmol/L [3.5 - 5.0]  Sodium, Serum 135 mmol/L [135 - 146]  [facility]  12-22-19 @ 06:22  Blood Gas Arterial-Calcium,Ionized--  Blood Urea Nitrogen, Serum 55 mg/dL<H> [10 - 20]  Carbon Dioxide, Serum19 mmol/L [17 - 32]  Chloride, Serum97 mmol/L<L> [98 - 110]  Creatinie, Serum4.2 mg/dL<HH> [0.7 - 1.5] [Critical value:]  Glucose, Gxmna941 mg/dL<H> [70 - 99]  Potassium, Serum4.3 mmol/L [3.5 - 5.0]  Sodium, Serum 138 mmol/L [135 - 146]              Microbiology:    Culture - Urine (collected 12-24-19 @ 09:06)  Source: .Urine Catheterized  Final Report (12-26-19 @ 06:37):    No growth            Medications:  acetaminophen   Tablet .. 650 milliGRAM(s) Oral every 6 hours PRN  acetaminophen  Suppository .. 650 milliGRAM(s) Rectal every 6 hours PRN  aluminum hydroxide/magnesium hydroxide/simethicone Suspension 30 milliLiter(s) Oral every 6 hours PRN  amLODIPine   Tablet 10 milliGRAM(s) Oral daily  apixaban 5 milliGRAM(s) Oral two times a day  chlorhexidine 4% Liquid 1 Application(s) Topical <User Schedule>  influenza   Vaccine 0.5 milliLiter(s) IntraMuscular once  levothyroxine 25 MICROGram(s) Oral daily  pantoprazole    Tablet 40 milliGRAM(s) Oral before breakfast  tamsulosin 0.4 milliGRAM(s) Oral at bedtime        Assessment and Plan:  83 YO M with PMH of HTN, hypothyroidism, h/o DVT, ?Paroxysmal A.Fib on Eliquis, hypercoagulable state, intellectual disability, brought in by EMS after an episode of syncope.     syncope episode due to complete heart block sp PPM placement:   sp PPM placement.  CTS signed off.    EP followed up and signed off.   as per EP, patient to wear sling to left arm to not move L arm above shoulder level as patient participates with PT to avoid leads displacement.  on 12/25/19 patient was restarted on  eliquis tx.  TTE noted: normal EF with grade 1 diastolic dysfunction.     Acute renal failure possibly postobstructive and pre-renal due to poor oral intake vs contrast induced injury:   renal function normalized. d/c IVF  continue holding HCTZ for next 24 hours.  CT abdomen/pelvis noted - mild left hydroureter   Nephrology consult appreciated.     mild left hydroureter with abdominal distension/no abdominal bowel obstruction:    distension better after manzano placement    as per urology   started on  flomax   TOV after 3 days of flomax   -f/up repeated US renal.    Acute left Ankle pain and edema- degenerative OA on X ray. PT tx. pain control prn.         h/o ?A-FIB / ?DVT with h/o hypercoagulable state:    restarted on   eliquis 5 mg bid   repeat LE doppler shows no DVT.     cholelithiasis :   stable / no cholecystitis   fu surgery as OP for elective removal of GB    leukocytosis unclear etiology:   WBC improved significantly   afebrile.      HTN:   BP stable  c/w norvasc/dced  HCTZ       nasal bone fractures:  denies pain for now  c/w tylenol prn.   ENT evaluation appreciated- no intervention.    Right 7th rib fracture  does not complaint of pain/ tylenol prn.   encourage use of incentive spirometry        PT/ physiatry eval and tx.     #Progress Note Handoff: repeat US renal, than TOV.   Family discussion: yes Disposition: possible STR in 24 hours

## 2019-12-26 NOTE — DIETITIAN INITIAL EVALUATION ADULT. - RD TO REMAIN AVAILABLE
yes/INTERVENTION: meals and snacks, coordination of care. ME: RD to monitor diet order, energy intake, body composition, NFPF

## 2019-12-26 NOTE — PROGRESS NOTE ADULT - SUBJECTIVE AND OBJECTIVE BOX
Patient Information:  KARLY DO / 84y / Male / MRN#:[id number]    Hospital Day: 7    HPI:  The patient is a 84-year-old male with a PMH of HTN, DVT/atrial fibrillation? (on Eliquis), hypothyroidism, and intellectual disability who presented after a syncopal event today.  The patient is a poor historian so further history was obtained from his sister at bedside.  The patient was reportedly walking to the store when he lost consciousness.  He is unable to provide further details about his syncopal event.    In the ED, the patient was found to be bradycardic and with complete heart block on ECG.  A transvenous pacemaker was placed and his HR subsequently improved.    Interval History:  Patient seen and examined at bedside. No overnight issues    Past Medical History:  Hypothyroidism  Hypertension  No pertinent past medical history    Past Surgical History:  No significant past surgical history    Allergies:  No Known Allergies    Medications:  PRN:  acetaminophen   Tablet .. 650 milliGRAM(s) Oral every 6 hours PRN Mild Pain (1 - 3)  acetaminophen  Suppository .. 650 milliGRAM(s) Rectal every 6 hours PRN Temp greater or equal to 38.5C (101.3F)  aluminum hydroxide/magnesium hydroxide/simethicone Suspension 30 milliLiter(s) Oral every 6 hours PRN Dyspepsia    Standing:  MEDICATIONS  (STANDING):  amLODIPine   Tablet 10 milliGRAM(s) Oral daily  chlorhexidine 4% Liquid 1 Application(s) Topical <User Schedule>  heparin  Injectable 5000 Unit(s) SubCutaneous every 8 hours  influenza   Vaccine 0.5 milliLiter(s) IntraMuscular once  lactated ringers. 1000 milliLiter(s) (100 mL/Hr) IV Continuous <Continuous>  lactulose Retention Enema 200 Gram(s) Rectal once  levothyroxine 25 MICROGram(s) Oral daily  pantoprazole    Tablet 40 milliGRAM(s) Oral before breakfast    MEDICATIONS  (PRN):  acetaminophen   Tablet .. 650 milliGRAM(s) Oral every 6 hours PRN Mild Pain (1 - 3)  acetaminophen  Suppository .. 650 milliGRAM(s) Rectal every 6 hours PRN Temp greater or equal to 38.5C (101.3F)  aluminum hydroxide/magnesium hydroxide/simethicone Suspension 30 milliLiter(s) Oral every 6 hours PRN Dyspepsia      Home:  amLODIPine 10 mg oral tablet: 1 tab(s) orally once a day  Eliquis:   levothyroxine 25 mcg (0.025 mg) oral tablet: 1 tab(s) orally once a day    Vitals:  Vital Signs Last 24 Hrs  T(C): 36.8 (23 Dec 2019 13:08), Max: 37.4 (22 Dec 2019 20:33)  T(F): 98.3 (23 Dec 2019 13:08), Max: 99.4 (22 Dec 2019 20:33)  HR: 84 (23 Dec 2019 13:08) (84 - 87)  BP: 142/67 (23 Dec 2019 13:08) (142/67 - 147/70)  BP(mean): --  RR: 18 (23 Dec 2019 13:08) (18 - 18)  SpO2: 95% (23 Dec 2019 08:07) (95% - 95%)  Physical Exam:  General: Awake, Alert. Not in acute distress.  Heart: Regular rate and rhythm; S1, S2; No murmurs.  Lungs: Clear to auscultation bilaterally.  Abdomen: hard, distended, nontender, BS+  Extremities: No edema in upper or lower extremities., Right upper ext: dressing present (PPM placed)  Neuro: AAOx2-3 No focal deficits.    Labs:                          11.1   8.14  )-----------( 192      ( 26 Dec 2019 06:40 )             34.2                           12.1   10.00 )-----------( 199      ( 24 Dec 2019 06:05 )             35.3                           13.1   14.94 )-----------( 189      ( 23 Dec 2019 05:23 )             39.6                             Hgb: 13.8   WBC: 16.63 )-----------------( Plts: 205                              Hct: 41.7       12-26    138  |  99  |  21<H>  ----------------------------<  107<H>  4.4   |  26  |  0.8    Ca    8.4<L>      26 Dec 2019 06:40  Mg     1.9     12-26    TPro  5.9<L>  /  Alb  2.9<L>  /  TBili  0.6  /  DBili  x   /  AST  78<H>  /  ALT  37  /  AlkPhos  168<H>  12-26 12-24    136  |  96<L>  |  64<HH>  ----------------------------<  106<H>  4.1   |  24  |  2.1<H>    Ca    8.4<L>      24 Dec 2019 06:05  Mg     2.5     12-24    TPro  6.7  /  Alb  3.5  /  TBili  0.9  /  DBili  x   /  AST  13  /  ALT  <5  /  AlkPhos  82  12-24 12-23    135  |  94<L>  |  88<HH>  ----------------------------<  124<H>  4.8   |  17  |  5.5<HH>    Ca    8.1<L>      23 Dec 2019 05:23  Mg     2.5     12-23    TPro  7.4  /  Alb  3.8  /  TBili  0.6  /  DBili  x   /  AST  14  /  ALT  <5  /  AlkPhos  94  12-23        Chem (12-22 @ 06:22)  Na: 138  |     Cl: 97     |  BUN: 55  -----------------------------------------< Gluc: 132    K: 4.3   |    HCO3: 19  |  Cr: 4.2    Ca 8.4 (12-22 @ 06:22)  Phos 4.8 (12-21 @ 05:21)  Mg 1.8 (12-21 @ 05:21)    LFTs (12-22 @ 06:22)  TPro 7.5  /  Alb 4.0  TBili 0.7  /  DBili     AST 17  /  ALT <5  /  AlkPhos 104    PT/INR (12-21 @ 05:21)  PT: 15.00; INR: 1.31   PTT: 30.1

## 2019-12-26 NOTE — PROGRESS NOTE ADULT - ASSESSMENT
83 YO M with PMH of HTN, hypothyroidism, h/o DVT, ?Paroxysmal A.Fib on Eliquis, hypercoagulable state, intellectual disability, brought in by EMS after an episode of syncope.     Pending/update: trial of void, then STR    #) Left ankle pain  -xray negative for fracture  -swelling and pain improved  -c/w pain med    #) SBO vs ileus >> Resolved  - patient ileus with small bowel dilation > which resolved  - no more obstruction having regular bowel movements    #) paroxysmal afib  - c/w eliquis    #) PEDRO   >> resolved  - secondary to urinary retention  - Trial of void today, > if pass can keep the manzano out  -repeat u/s negative for hydronephrosis    #Syncope episode due to complete heart block  - S/p PPM placement 12/20  - Echo 12/20 LVEF 60%, Grade I Diastolic Dysfunction  - CTS following, no heavy lifting >10lbs or raising arm above shoulder level for 4-6 weeks, no submerging in water for 1 month, keep occlusive dressing intact- will be removed in cardiac surgery clinic by Dr. Zavala in 1 week  - EP following, no events on interrogation, follow up with Dr. Watson in 4-6 weeks  - Follow up PT/Phys eval    #HTN  - Continue Norvasc 10mg PO Daily  - Continue HCTZ 25mg PO Daily    #Afib  - will restart eliquis once kidney function improves    #Nasal bone fractures  #Right 7th rib fracture  - Per ENT, no acute intervention, continue bacitracin to wound on nasal bridge  - Continue incentive spirometry  - Continue Tylenol PRN pain    #Diet: DASH/TLC  #GI Prophylaxis: Protonix 40mg PO Daily  #DVT Prophylaxis: eliquis,   #Activity: Ambulate as tolerated  #Code Status: Full Code  #) Dispo: trial of void then str 85 YO M with PMH of HTN, hypothyroidism, h/o DVT, ?Paroxysmal A.Fib on Eliquis, hypercoagulable state, intellectual disability, brought in by EMS after an episode of syncope.     Pending/update: trial of void, then STR    #) Left ankle pain  -xray negative for fracture  -swelling and pain improved  -c/w pain med    #) SBO vs ileus >> Resolved  - patient ileus with small bowel dilation > which resolved  - no more obstruction having regular bowel movements    #) paroxysmal afib  - c/w eliquis    #) PEDRO   >> resolved  - secondary to urinary retention  - Trial of void today, > if pass can keep the manzano out  -c/w manzano  -repeat u/s negative for hydronephrosis    #Syncope episode due to complete heart block  - S/p PPM placement 12/20  - Echo 12/20 LVEF 60%, Grade I Diastolic Dysfunction  - CTS following, no heavy lifting >10lbs or raising arm above shoulder level for 4-6 weeks, no submerging in water for 1 month, keep occlusive dressing intact- will be removed in cardiac surgery clinic by Dr. Zavala in 1 week  - EP following, no events on interrogation, follow up with Dr. Watson in 4-6 weeks  - Follow up PT/Phys eval    #HTN  - Continue Norvasc 10mg PO Daily  - Continue HCTZ 25mg PO Daily    #Afib  - will restart eliquis once kidney function improves    #Nasal bone fractures  #Right 7th rib fracture  - Per ENT, no acute intervention, continue bacitracin to wound on nasal bridge  - Continue incentive spirometry  - Continue Tylenol PRN pain    #Diet: DASH/TLC  #GI Prophylaxis: Protonix 40mg PO Daily  #DVT Prophylaxis: eliquis,   #Activity: Ambulate as tolerated  #Code Status: Full Code  #) Dispo: trial of void then str 85 YO M with PMH of HTN, hypothyroidism, h/o DVT, ?Paroxysmal A.Fib on Eliquis, hypercoagulable state, intellectual disability, brought in by EMS after an episode of syncope.     Pending/update: trial of void, then STR    #) Left ankle pain  -xray negative for fracture  -swelling and pain improved  -c/w pain med    #) SBO vs ileus >> Resolved  - patient ileus with small bowel dilation > which resolved  - no more obstruction having regular bowel movements    #) paroxysmal afib  - c/w eliquis    #) PEDRO   >> resolved  - secondary to urinary retention  - Trial of void today, > if pass can keep the manzano out  -c/w flomax  -repeat u/s negative for hydronephrosis    #Syncope episode due to complete heart block  - S/p PPM placement 12/20  - Echo 12/20 LVEF 60%, Grade I Diastolic Dysfunction  - CTS following, no heavy lifting >10lbs or raising arm above shoulder level for 4-6 weeks, no submerging in water for 1 month, keep occlusive dressing intact- will be removed in cardiac surgery clinic by Dr. Zavala in 1 week  - EP following, no events on interrogation, follow up with Dr. Watson in 4-6 weeks  - Follow up PT/Phys eval    #HTN  - Continue Norvasc 10mg PO Daily  - Continue HCTZ 25mg PO Daily    #Afib  - will restart eliquis once kidney function improves    #Nasal bone fractures  #Right 7th rib fracture  - Per ENT, no acute intervention, continue bacitracin to wound on nasal bridge  - Continue incentive spirometry  - Continue Tylenol PRN pain    #Diet: DASH/TLC  #GI Prophylaxis: Protonix 40mg PO Daily  #DVT Prophylaxis: eliquis,   #Activity: Ambulate as tolerated  #Code Status: Full Code  #) Dispo: trial of void then str

## 2019-12-26 NOTE — PROGRESS NOTE ADULT - ASSESSMENT
84 y.o M with urinary retention, PEDRO, mild left hydro, post obstructive diuresis       Plan:  [place] repeat Renal US today to re-evaluate left hydronephrosis   Cont. Flomax   Strict I&O   TOV when ambulating.    attending will F/U

## 2019-12-26 NOTE — PROGRESS NOTE ADULT - SUBJECTIVE AND OBJECTIVE BOX
Pt. seen and examined at bedside in NAD, Villavicencio catheter in place draining yellow clear urine. No acute events overnight, appears to be poor historian.     Vital Signs Last 24 Hrs  T(C): 36.6 (26 Dec 2019 04:32), Max: 36.8 (25 Dec 2019 21:29)  T(F): 97.8 (26 Dec 2019 04:32), Max: 98.3 (25 Dec 2019 21:29)  HR: 68 (26 Dec 2019 04:32) (65 - 73)  BP: 126/59 (26 Dec 2019 04:32) (105/59 - 162/74)  RR: 18 (26 Dec 2019 04:32) (18 - 18)     MEDICATIONS  (STANDING):  amLODIPine   Tablet 10 milliGRAM(s) Oral daily  apixaban 5 milliGRAM(s) Oral two times a day  chlorhexidine 4% Liquid 1 Application(s) Topical <User Schedule>  influenza   Vaccine 0.5 milliLiter(s) IntraMuscular once  levothyroxine 25 MICROGram(s) Oral daily  pantoprazole    Tablet 40 milliGRAM(s) Oral before breakfast  tamsulosin 0.4 milliGRAM(s) Oral at bedtime    MEDICATIONS  (PRN):  acetaminophen   Tablet .. 650 milliGRAM(s) Oral every 6 hours PRN Mild Pain (1 - 3)  acetaminophen  Suppository .. 650 milliGRAM(s) Rectal every 6 hours PRN Temp greater or equal to 38.5C (101.3F)  aluminum hydroxide/magnesium hydroxide/simethicone Suspension 30 milliLiter(s) Oral every 6 hours PRN Dyspepsia      PE:  General; WN/WD in [facility]  HEENT: NC, healing small laceration on the bridge of the nose , no active bleeding   Abd: Soft, NT, ND, no suprapubic ttp  : Normal male genitalia, +Villavicencio catheter in place drains clear yellow urine   Extremities: BUCIO x 4        I&O's Detail    25 Dec 2019 07:01  -  26 Dec 2019 07:00  --------------------------------------------------------  IN:    Oral Fluid: 1800 mL  Total IN: 1800 mL    OUT:    Indwelling Catheter - Urethral: 1300 mL    Voided: 1350 mL  Total OUT: 2650 mL    Total NET: -850 mL      26 Dec 2019 07:01  -  26 Dec 2019 11:19  --------------------------------------------------------  IN:    Oral Fluid: 450 mL  Total IN: 450 mL    OUT:    Voided: 450 mL  Total OUT: 450 mL    Total NET: 0 mL      LABS:                        11.1   8.14  )-----------( 192      ( 26 Dec 2019 06:40 )             34.2     12-26    138  |  99  |  21<H>  ----------------------------<  107<H>  4.4   |  26  |  0.8    Ca    8.4<L>      26 Dec 2019 06:40  Mg     1.9     12-26    TPro  5.9<L>  /  Alb  2.9<L>  /  TBili  0.6  /  DBili  x   /  AST  78<H>  /  ALT  37  /  AlkPhos  168<H>  12-26    Urinalysis (12.24.19 @ 09:06)    Glucose Qualitative, Urine: Negative    Blood, Urine: Moderate    pH Urine: 6.0    Color: Yellow    Urine Appearance: Turbid    Bilirubin: Negative    Ketone - Urine: Negative    Specific Gravity: 1.016    Protein, Urine: 30 mg/dL    Urobilinogen: <2 mg/dL    Nitrite: Negative    Leukocyte Esterase Concentration: Small     Culture - Urine (12.24.19 @ 09:06)    Specimen Source: .Urine Catheterized    Culture Results:   No growth      RADIOLOGY & ADDITIONAL STUDIES:     < from: CT Abdomen and Pelvis w/ Oral Cont (12.23.19 @ 23:26) >  IMPRESSION:   1.  Since December 19, 2019, new mild left hydroureter and periureteral fat stranding without evidence of an obstructing calculus. Findings may be secondary to a recently passed calculus versus an ascending urinary tract infection. Correlate with clinical and laboratory findings.    2.  No evidence of bowel obstruction.    3.  Cholelithiasis.        HAILEE DIANE M.D., ATTENDING RADIOLOGIST  This document has been electronically signed. Dec 24 2019  8:30AM    < end of copied text > 84 y.o M with urinary retention, PEDRO, mild left hydro, post obstructive diuresis   Pt. seen and examined at bedside in [facility], Villavicencio catheter in place draining yellow clear urine. No acute events overnight, appears to be poor historian.     Vital Signs Last 24 Hrs  T(C): 36.6 (26 Dec 2019 04:32), Max: 36.8 (25 Dec 2019 21:29)  T(F): 97.8 (26 Dec 2019 04:32), Max: 98.3 (25 Dec 2019 21:29)  HR: 68 (26 Dec 2019 04:32) (65 - 73)  BP: 126/59 (26 Dec 2019 04:32) (105/59 - 162/74)  RR: 18 (26 Dec 2019 04:32) (18 - 18)     MEDICATIONS  (STANDING):  amLODIPine   Tablet 10 milliGRAM(s) Oral daily  apixaban 5 milliGRAM(s) Oral two times a day  chlorhexidine 4% Liquid 1 Application(s) Topical <User Schedule>  influenza   Vaccine 0.5 milliLiter(s) IntraMuscular once  levothyroxine 25 MICROGram(s) Oral daily  pantoprazole    Tablet 40 milliGRAM(s) Oral before breakfast  tamsulosin 0.4 milliGRAM(s) Oral at bedtime    MEDICATIONS  (PRN):  acetaminophen   Tablet .. 650 milliGRAM(s) Oral every 6 hours PRN Mild Pain (1 - 3)  acetaminophen  Suppository .. 650 milliGRAM(s) Rectal every 6 hours PRN Temp greater or equal to 38.5C (101.3F)  aluminum hydroxide/magnesium hydroxide/simethicone Suspension 30 milliLiter(s) Oral every 6 hours PRN Dyspepsia      PE:  General; WN/WD in [facility]  HEENT: NC, healing small laceration on the bridge of the nose , no active bleeding   Abd: Soft, NT, ND, no suprapubic ttp  : Normal male genitalia, +Villavicencio catheter in place drains clear yellow urine   Extremities: BUCIO x 4        I&O's Detail    25 Dec 2019 07:01  -  26 Dec 2019 07:00  --------------------------------------------------------  IN:    Oral Fluid: 1800 mL  Total IN: 1800 mL    OUT:    Indwelling Catheter - Urethral: 1300 mL    Voided: 1350 mL  Total OUT: 2650 mL    Total NET: -850 mL      26 Dec 2019 07:01  -  26 Dec 2019 11:19  --------------------------------------------------------  IN:    Oral Fluid: 450 mL  Total IN: 450 mL    OUT:    Voided: 450 mL  Total OUT: 450 mL    Total NET: 0 mL      LABS:                        11.1   8.14  )-----------( 192      ( 26 Dec 2019 06:40 )             34.2     12-26    138  |  99  |  21<H>  ----------------------------<  107<H>  4.4   |  26  |  0.8    Ca    8.4<L>      26 Dec 2019 06:40  Mg     1.9     12-26    TPro  5.9<L>  /  Alb  2.9<L>  /  TBili  0.6  /  DBili  x   /  AST  78<H>  /  ALT  37  /  AlkPhos  168<H>  12-26    Urinalysis (12.24.19 @ 09:06)    Glucose Qualitative, Urine: Negative    Blood, Urine: Moderate    pH Urine: 6.0    Color: Yellow    Urine Appearance: Turbid    Bilirubin: Negative    Ketone - Urine: Negative    Specific Gravity: 1.016    Protein, Urine: 30 mg/dL    Urobilinogen: <2 mg/dL    Nitrite: Negative    Leukocyte Esterase Concentration: Small     Culture - Urine (12.24.19 @ 09:06)    Specimen Source: .Urine Catheterized    Culture Results:   No growth      RADIOLOGY & ADDITIONAL STUDIES:     < from: CT Abdomen and Pelvis w/ Oral Cont (12.23.19 @ 23:26) >  IMPRESSION:   1.  Since December 19, 2019, new mild left hydroureter and periureteral fat stranding without evidence of an obstructing calculus. Findings may be secondary to a recently passed calculus versus an ascending urinary tract infection. Correlate with clinical and laboratory findings.    2.  No evidence of bowel obstruction.    3.  Cholelithiasis.        HAILEE DIANE M.D., ATTENDING RADIOLOGIST  This document has been electronically signed. Dec 24 2019  8:30AM    < end of copied text >

## 2019-12-27 ENCOUNTER — TRANSCRIPTION ENCOUNTER (OUTPATIENT)
Age: 84
End: 2019-12-27

## 2019-12-27 LAB
ALBUMIN SERPL ELPH-MCNC: 3.8 G/DL — SIGNIFICANT CHANGE UP (ref 3.5–5.2)
ALP SERPL-CCNC: 253 U/L — HIGH (ref 30–115)
ALT FLD-CCNC: 55 U/L — HIGH (ref 0–41)
ANION GAP SERPL CALC-SCNC: 19 MMOL/L — HIGH (ref 7–14)
AST SERPL-CCNC: 89 U/L — HIGH (ref 0–41)
BASOPHILS # BLD AUTO: 0.12 K/UL — SIGNIFICANT CHANGE UP (ref 0–0.2)
BASOPHILS NFR BLD AUTO: 0.8 % — SIGNIFICANT CHANGE UP (ref 0–1)
BILIRUB SERPL-MCNC: 0.8 MG/DL — SIGNIFICANT CHANGE UP (ref 0.2–1.2)
BUN SERPL-MCNC: 31 MG/DL — HIGH (ref 10–20)
CALCIUM SERPL-MCNC: 9.2 MG/DL — SIGNIFICANT CHANGE UP (ref 8.5–10.1)
CHLORIDE SERPL-SCNC: 97 MMOL/L — LOW (ref 98–110)
CO2 SERPL-SCNC: 21 MMOL/L — SIGNIFICANT CHANGE UP (ref 17–32)
CREAT SERPL-MCNC: 1.2 MG/DL — SIGNIFICANT CHANGE UP (ref 0.7–1.5)
EOSINOPHIL # BLD AUTO: 0.12 K/UL — SIGNIFICANT CHANGE UP (ref 0–0.7)
EOSINOPHIL NFR BLD AUTO: 0.8 % — SIGNIFICANT CHANGE UP (ref 0–8)
GLUCOSE SERPL-MCNC: 130 MG/DL — HIGH (ref 70–99)
HCT VFR BLD CALC: 40.4 % — LOW (ref 42–52)
HGB BLD-MCNC: 13.1 G/DL — LOW (ref 14–18)
IMM GRANULOCYTES NFR BLD AUTO: 3.4 % — HIGH (ref 0.1–0.3)
LYMPHOCYTES # BLD AUTO: 1.34 K/UL — SIGNIFICANT CHANGE UP (ref 1.2–3.4)
LYMPHOCYTES # BLD AUTO: 8.7 % — LOW (ref 20.5–51.1)
MAGNESIUM SERPL-MCNC: 2 MG/DL — SIGNIFICANT CHANGE UP (ref 1.8–2.4)
MCHC RBC-ENTMCNC: 28.1 PG — SIGNIFICANT CHANGE UP (ref 27–31)
MCHC RBC-ENTMCNC: 32.4 G/DL — SIGNIFICANT CHANGE UP (ref 32–37)
MCV RBC AUTO: 86.7 FL — SIGNIFICANT CHANGE UP (ref 80–94)
MONOCYTES # BLD AUTO: 1.36 K/UL — HIGH (ref 0.1–0.6)
MONOCYTES NFR BLD AUTO: 8.8 % — SIGNIFICANT CHANGE UP (ref 1.7–9.3)
NEUTROPHILS # BLD AUTO: 12.03 K/UL — HIGH (ref 1.4–6.5)
NEUTROPHILS NFR BLD AUTO: 77.5 % — HIGH (ref 42.2–75.2)
NRBC # BLD: 0 /100 WBCS — SIGNIFICANT CHANGE UP (ref 0–0)
PLATELET # BLD AUTO: 273 K/UL — SIGNIFICANT CHANGE UP (ref 130–400)
POTASSIUM SERPL-MCNC: 5.1 MMOL/L — HIGH (ref 3.5–5)
POTASSIUM SERPL-SCNC: 5.1 MMOL/L — HIGH (ref 3.5–5)
PROT SERPL-MCNC: 7.7 G/DL — SIGNIFICANT CHANGE UP (ref 6–8)
RBC # BLD: 4.66 M/UL — LOW (ref 4.7–6.1)
RBC # FLD: 14.6 % — HIGH (ref 11.5–14.5)
SODIUM SERPL-SCNC: 137 MMOL/L — SIGNIFICANT CHANGE UP (ref 135–146)
WBC # BLD: 15.49 K/UL — HIGH (ref 4.8–10.8)
WBC # FLD AUTO: 15.49 K/UL — HIGH (ref 4.8–10.8)

## 2019-12-27 PROCEDURE — 99233 SBSQ HOSP IP/OBS HIGH 50: CPT

## 2019-12-27 PROCEDURE — 71045 X-RAY EXAM CHEST 1 VIEW: CPT | Mod: 26

## 2019-12-27 PROCEDURE — 74018 RADEX ABDOMEN 1 VIEW: CPT | Mod: 26

## 2019-12-27 RX ORDER — POLYETHYLENE GLYCOL 3350 17 G/17G
17 POWDER, FOR SOLUTION ORAL
Qty: 0 | Refills: 0 | DISCHARGE
Start: 2019-12-27

## 2019-12-27 RX ORDER — SENNA PLUS 8.6 MG/1
1 TABLET ORAL DAILY
Refills: 0 | Status: DISCONTINUED | OUTPATIENT
Start: 2019-12-27 | End: 2019-12-31

## 2019-12-27 RX ORDER — TAMSULOSIN HYDROCHLORIDE 0.4 MG/1
1 CAPSULE ORAL
Qty: 0 | Refills: 0 | DISCHARGE
Start: 2019-12-27

## 2019-12-27 RX ORDER — SENNA PLUS 8.6 MG/1
1 TABLET ORAL
Qty: 0 | Refills: 0 | DISCHARGE
Start: 2019-12-27

## 2019-12-27 RX ORDER — POLYETHYLENE GLYCOL 3350 17 G/17G
17 POWDER, FOR SOLUTION ORAL DAILY
Refills: 0 | Status: DISCONTINUED | OUTPATIENT
Start: 2019-12-27 | End: 2019-12-31

## 2019-12-27 RX ADMIN — APIXABAN 5 MILLIGRAM(S): 2.5 TABLET, FILM COATED ORAL at 17:22

## 2019-12-27 RX ADMIN — AMLODIPINE BESYLATE 10 MILLIGRAM(S): 2.5 TABLET ORAL at 05:37

## 2019-12-27 RX ADMIN — PANTOPRAZOLE SODIUM 40 MILLIGRAM(S): 20 TABLET, DELAYED RELEASE ORAL at 06:12

## 2019-12-27 RX ADMIN — APIXABAN 5 MILLIGRAM(S): 2.5 TABLET, FILM COATED ORAL at 05:36

## 2019-12-27 RX ADMIN — Medication 25 MICROGRAM(S): at 05:36

## 2019-12-27 RX ADMIN — TAMSULOSIN HYDROCHLORIDE 0.4 MILLIGRAM(S): 0.4 CAPSULE ORAL at 21:51

## 2019-12-27 RX ADMIN — POLYETHYLENE GLYCOL 3350 17 GRAM(S): 17 POWDER, FOR SOLUTION ORAL at 12:13

## 2019-12-27 RX ADMIN — SENNA PLUS 1 TABLET(S): 8.6 TABLET ORAL at 12:13

## 2019-12-27 NOTE — DISCHARGE NOTE PROVIDER - NSDCMRMEDTOKEN_GEN_ALL_CORE_FT
amLODIPine 10 mg oral tablet: 1 tab(s) orally once a day  Eliquis:   levothyroxine 25 mcg (0.025 mg) oral tablet: 1 tab(s) orally once a day  polyethylene glycol 3350 oral powder for reconstitution: 17 gram(s) orally once a day, As Needed  senna oral tablet: 1 tab(s) orally once a day, As Needed  tamsulosin 0.4 mg oral capsule: 1 cap(s) orally once a day (at bedtime)

## 2019-12-27 NOTE — DISCHARGE NOTE PROVIDER - CARE PROVIDER_API CALL
Brannon Menard)  Internal Medicine  [facility] [place] [address]  Phone: [phone]  Fax: [phone]  Follow Up Time: 1 week    Ara Dukes)  Cardiology; Internal Medicine  [address] [place] [address]  Phone: [phone]  Fax: [phone]  Follow Up Time: 1 month Brannon Menard)  Internal Medicine  [address] [place] [address]  Phone: [phone]  Fax: [phone]  Follow Up Time: 1 week    Ara Dukes)  Cardiology; Internal Medicine  [address] [place] [address], [address]  [place] [address]  Phone: [phone]  Fax: [phone]  Follow Up Time: 1 month    Micah Villa)  Urology  [address] [facility], [address]  [place] [address]  Phone: [phone]  Fax: [phone]  Follow Up Time: 1 week

## 2019-12-27 NOTE — DISCHARGE NOTE PROVIDER - NSDCFUADDINST_GEN_ALL_CORE_FT
please follow up with your primary care doctor within one week    Please follow up with nephrologist to re-evaluate the need of manzano    - Please f/u with Dr. Watson in 4-6 weeks    Discharge Instructions:  - Wear sling for 6 weeks on LUE  - May resume Eliquis starting tomorrow evening  - No heavy lifting >5 lbs. for 4-6 weeks  - Do not raise your arm above shoulder level for 4-6 weeks.   - Wound care per CT surgery  - No submerging in water for 1 month.  - Leave steri-strips in place, they will fall off on their own.  - No driving for 2 weeks.

## 2019-12-27 NOTE — DISCHARGE NOTE PROVIDER - CARE PROVIDERS DIRECT ADDRESSES
,[email or web address]

## 2019-12-27 NOTE — DISCHARGE NOTE PROVIDER - NSDCCPCAREPLAN_GEN_ALL_CORE_FT
PRINCIPAL DISCHARGE DIAGNOSIS  Diagnosis: Complete heart block  Assessment and Plan of Treatment: you were treated with pacemaker please follow up with dr. Oro in 4-6 weeks      SECONDARY DISCHARGE DIAGNOSES  Diagnosis: PEDRO (acute kidney injury)  Assessment and Plan of Treatment: you were diagnosed with PEDRO, treated with manzano,    Diagnosis: Nasal fracture  Assessment and Plan of Treatment: please follow up with ENT if you have any new pain surrounding that area PRINCIPAL DISCHARGE DIAGNOSIS  Diagnosis: Complete heart block  Assessment and Plan of Treatment: you were treated with pacemaker please follow up with dr. Oro in 4-6 weeks.  - Wear sling for 6 weeks on LUE  - No heavy lifting >5 lbs. for 4-6 weeks  - Do not raise your arm above shoulder level for 4-6 weeks.   - No submerging in water for 1 month.  - Leave steri-strips in place, they will fall off on their own.  - No driving for 2 weeks.      SECONDARY DISCHARGE DIAGNOSES  Diagnosis: PEDRO (acute kidney injury)  Assessment and Plan of Treatment: You were diagnosed with PEDRO due to post renal injury that resolved with folley placement. You failed trial of void on 12/26 and 12/30 and folley was reisnerted. Trial of void can be attempted again when you are ambulating at the nursing home. Urology has been following and they recommended follow up in the clinics for urodynamic testing in 1 week    Diagnosis: Nasal fracture  Assessment and Plan of Treatment: please follow up with ENT if you have any new pain surrounding that area

## 2019-12-27 NOTE — DISCHARGE NOTE PROVIDER - PROVIDER TOKENS
PROVIDER:[TOKEN:[[id number]],FOLLOWUP:[1 week]],PROVIDER:[TOKEN:[[id number]],FOLLOWUP:[1 month]] PROVIDER:[TOKEN:[[id number]],FOLLOWUP:[1 week]],PROVIDER:[TOKEN:[[id number]],FOLLOWUP:[1 month]],PROVIDER:[TOKEN:[[id number]],FOLLOWUP:[1 week]]

## 2019-12-27 NOTE — DISCHARGE NOTE PROVIDER - HOSPITAL COURSE
83 YO M with PMH of HTN, hypothyroidism, h/o DVT, ?Paroxysmal A.Fib on Eliquis, hypercoagulable state, intellectual disability, brought in by EMS after an episode of syncope. Patient was bradycardic s/p PPM. patient needs to follow up with     dr. Oro         > patient also developed PEDRO (Post-renal PEDRO) which resolved after inserting manzano. Patient failed the trial of void (recent bladder scan showed 900cc), manzano was re-inserted.        - Please f/u with Dr. Watson in 4-6 weeks        Discharge Instructions:    - Wear sling for 6 weeks on LUE    - May resume Eliquis starting tomorrow evening    - No heavy lifting >5 lbs. for 4-6 weeks    - Do not raise your arm above shoulder level for 4-6 weeks.     - Wound care per CT surgery    - No submerging in water for 1 month.    - Leave steri-strips in place, they will fall off on their own.    - No driving for 2 weeks. 83 YO M with PMH of HTN, hypothyroidism, h/o DVT, ?Paroxysmal A.Fib on Eliquis, hypercoagulable state, intellectual disability, brought in by EMS after an episode of syncope. Patient was bradycardic s/p PPM. patient needs to follow up with     dr. Oro         > patient also developed PEDRO (Post-renal PEDRO) which resolved after inserting manzano. Patient failed the trial of void (recent bladder scan showed 900cc), manzano was re-inserted. please follow up with primary care doctor and nephrologist for trial of void        - Please f/u with Dr. Watson in 4-6 weeks        Discharge Instructions:    - Wear sling for 6 weeks on LUE    - May resume Eliquis starting tomorrow evening    - No heavy lifting >5 lbs. for 4-6 weeks    - Do not raise your arm above shoulder level for 4-6 weeks.     - Wound care per CT surgery    - No submerging in water for 1 month.    - Leave steri-strips in place, they will fall off on their own.    - No driving for 2 weeks. 85 YO M with PMH of HTN, hypothyroidism, h/o DVT, ?Paroxysmal A.Fib on Eliquis, hypercoagulable state, intellectual disability, brought in by EMS after an episode of syncope. Patient was bradycardic s/p PPM. patient needs to follow up with     dr. Oro         > patient also developed PEDRO (Post-renal PERDO) which resolved after inserting manzano. Patient failed the trial of void on 12/26 and 12/30 (recent bladder scan showed 900cc), manzano was re-inserted. please follow up with primary care doctor and urology as outpatient in the clinics        - Please f/u with Dr. Watson in 4-6 weeks        Discharge Instructions:    - Wear sling for 6 weeks on LUE    - No heavy lifting >5 lbs. for 4-6 weeks    - Do not raise your arm above shoulder level for 4-6 weeks.     - No submerging in water for 1 month.    - Leave steri-strips in place, they will fall off on their own.    - No driving for 2 weeks.

## 2019-12-27 NOTE — PROGRESS NOTE ADULT - SUBJECTIVE AND OBJECTIVE BOX
HI, ALFRED  [facility] T2-3A 015 A ([facility] T2-3A)            Patient was evaluated and examined  by bedside, tolerating diet well, manzano was d/c yesterday, f/up with bedside bladder scan today, no c/o left ankle pain.        REVIEW OF SYSTEMS:  please see pertinent positives mentioned above, all other 12 ROS negative      T(C): , Max: 37.8 (12-26-19 @ 21:01)  HR: 75 (12-27-19 @ 06:09)  BP: 148/70 (12-27-19 @ 06:09)  RR: 17 (12-27-19 @ 06:09)  SpO2: --  CAPILLARY BLOOD GLUCOSE          PHYSICAL EXAM:  General: NAD, AAOX3, patient is laying comfortably in bed  HEENT: AT, NC, Supple, NO JVD, NO CB  Lungs: CTA B/L, no wheezing, no rhonchi  CVS: normal S1, S2, RRR, NO M/G/R  Abdomen: soft, bowel sounds present, non-tender, mildly-distended  Extremities: no edema, no clubbing, no cyanosis, positive peripheral pulses b/l  Neuro: no acute focal neurological deficits  Skin: no rash, no ecchymosis      LAB  CBC  Date: 12-27-19 @ 07:40  Mean cell Xbjndjttwl33.1  Mean cell Hemoglobin Conc32.4  Mean cell Volum 86.7  Platelet count-Automate 273  RBC Count 4.66  Red Cell Distrib Width14.6  WBC Count15.49  % Albumin, Urine--  Hematocrit 40.4  Hemoglobin 13.1  CBC  Date: 12-26-19 @ 06:40  Mean cell Rcietmodhz06.7  Mean cell Hemoglobin Conc32.5  Mean cell Volum 85.3  Platelet count-Automate 192  RBC Count 4.01  Red Cell Distrib Width14.2  WBC Count8.14  % Albumin, Urine--  Hematocrit 34.2  Hemoglobin 11.1  CBC  Date: 12-25-19 @ 06:48  Mean cell Wotahpyncy48.4  Mean cell Hemoglobin Conc33.7  Mean cell Volum 84.1  Platelet count-Automate 188  RBC Count 4.02  Red Cell Distrib Width14.1  WBC Count7.44  % Albumin, Urine--  Hematocrit 33.8  Hemoglobin 11.4  CBC  Date: 12-24-19 @ 06:05  Mean cell Fvvxwhxait94.1  Mean cell Hemoglobin Conc34.3  Mean cell Volum 81.9  Platelet count-Automate 199  RBC Count 4.31  Red Cell Distrib Width13.9  WBC Count10.00  % Albumin, Urine--  Hematocrit 35.3  Hemoglobin 12.1  CBC  Date: 12-23-19 @ 05:23  Mean cell Sifnqexcrs00.7  Mean cell Hemoglobin Conc33.1  Mean cell Volum 83.7  Platelet count-Automate 189  RBC Count 4.73  Red Cell Distrib Width14.0  WBC Count14.94  % Albumin, Urine--  Hematocrit 39.6  Hemoglobin 13.1  CBC  Date: 12-22-19 @ 06:22  Mean cell Awyhdpyfbp21.6  Mean cell Hemoglobin Conc33.1  Mean cell Volum 83.4  Platelet count-Automate 205  RBC Count 5.00  Red Cell Distrib Width14.0  WBC Count16.63  % Albumin, Urine--  Hematocrit 41.7  Hemoglobin 13.8  CBC  Date: 12-21-19 @ 05:21  Mean cell Pnoviyejom07.3  Mean cell Hemoglobin Conc33.8  Mean cell Volum 83.7  Platelet count-Automate 204  RBC Count 4.84  Red Cell Distrib Width13.6  WBC Count11.23  % Albumin, Urine--  Hematocrit 40.5  Hemoglobin 13.7    [facility]  12-27-19 @ 07:40  Blood Gas Arterial-Calcium,Ionized--  Blood Urea Nitrogen, Serum 31 mg/dL<H> [10 - 20]  Carbon Dioxide, Serum21 mmol/L [17 - 32]  Chloride, Serum97 mmol/L<L> [98 - 110]  Creatinie, Serum1.2 mg/dL [0.7 - 1.5]  Glucose, Hbejg906 mg/dL<H> [70 - 99]  Potassium, Serum5.1 mmol/L<H> [3.5 - 5.0]  Sodium, Serum 137 mmol/L [135 - 146]  [facility]  12-26-19 @ 06:40  Blood Gas Arterial-Calcium,Ionized--  Blood Urea Nitrogen, Serum 21 mg/dL<H> [10 - 20]  Carbon Dioxide, Serum26 mmol/L [17 - 32]  Chloride, Serum99 mmol/L [98 - 110]  Creatinie, Serum0.8 mg/dL [0.7 - 1.5]  Glucose, Sdqtr096 mg/dL<H> [70 - 99]  Potassium, Serum4.4 mmol/L [3.5 - 5.0]  Sodium, Serum 138 mmol/L [135 - 146]  [facility]  12-25-19 @ 06:48  Blood Gas Arterial-Calcium,Ionized--  Blood Urea Nitrogen, Serum 30 mg/dL<H> [10 - 20]  Carbon Dioxide, Serum24 mmol/L [17 - 32]  Chloride, Serum98 mmol/L [98 - 110]  Creatinie, Serum0.8 mg/dL [0.7 - 1.5]  Glucose, Mxdlb781 mg/dL<H> [70 - 99]  Potassium, Serum3.9 mmol/L [3.5 - 5.0]  Sodium, Serum 138 mmol/L [135 - 146]  [facility]  12-24-19 @ 22:43  Blood Gas Arterial-Calcium,Ionized--  Blood Urea Nitrogen, Serum 40 mg/dL<H> [10 - 20]  Carbon Dioxide, Serum23 mmol/L [17 - 32]  Chloride, Serum99 mmol/L [98 - 110]  Creatinie, Serum1.1 mg/dL [0.7 - 1.5]  Glucose, Lpmur722 mg/dL<H> [70 - 99]  Potassium, Serum3.9 mmol/L [3.5 - 5.0]  Sodium, Serum 137 mmol/L [135 - 146]  [facility]  12-24-19 @ 18:36  Blood Gas Arterial-Calcium,Ionized--  Blood Urea Nitrogen, Serum 42 mg/dL<H> [10 - 20]  Carbon Dioxide, Serum23 mmol/L [17 - 32]  Chloride, Serum96 mmol/L<L> [98 - 110]  Creatinie, Serum1.2 mg/dL [0.7 - 1.5]  Glucose, Vjmbv690 mg/dL<H> [70 - 99]  Potassium, Serum4.3 mmol/L [3.5 - 5.0]  Sodium, Serum 135 mmol/L [135 - 146]  [facility]  12-24-19 @ 06:05  Blood Gas Arterial-Calcium,Ionized--  Blood Urea Nitrogen, Serum 64 mg/dL<HH> [10 - 20] [Critical value:]  Carbon Dioxide, Serum24 mmol/L [17 - 32]  Chloride, Serum96 mmol/L<L> [98 - 110]  Creatinie, Serum2.1 mg/dL<H> [0.7 - 1.5]  Glucose, Smxyd795 mg/dL<H> [70 - 99]  Potassium, Serum4.1 mmol/L [3.5 - 5.0]  Sodium, Serum 136 mmol/L [135 - 146]  [facility]  12-23-19 @ 22:04  Blood Gas Arterial-Calcium,Ionized--  Blood Urea Nitrogen, Serum 77 mg/dL<HH> [10 - 20] [Critical value:]  Carbon Dioxide, Serum18 mmol/L [17 - 32]  Chloride, Serum94 mmol/L<L> [98 - 110]  Creatinie, Serum3.5 mg/dL<H> [0.7 - 1.5]  Glucose, Htjyf057 mg/dL<H> [70 - 99]  Potassium, Serum4.2 mmol/L [3.5 - 5.0]  Sodium, Serum 132 mmol/L<L> [135 - 146]  [facility]  12-23-19 @ 05:23  Blood Gas Arterial-Calcium,Ionized--  Blood Urea Nitrogen, Serum 88 mg/dL<HH> [10 - 20] [Critical value:]  Carbon Dioxide, Serum17 mmol/L [17 - 32]  Chloride, Serum94 mmol/L<L> [98 - 110]  Creatinie, Serum5.5 mg/dL<HH> [0.7 - 1.5] [Critical value:]  Glucose, Jcied136 mg/dL<H> [70 - 99]  Potassium, Serum4.8 mmol/L [3.5 - 5.0]  Sodium, Serum 135 mmol/L [135 - 146]              Microbiology:    Culture - Urine (collected 12-24-19 @ 09:06)  Source: .Urine Catheterized  Final Report (12-26-19 @ 06:37):    No growth          Medications:  acetaminophen   Tablet .. 650 milliGRAM(s) Oral every 6 hours PRN  acetaminophen  Suppository .. 650 milliGRAM(s) Rectal every 6 hours PRN  aluminum hydroxide/magnesium hydroxide/simethicone Suspension 30 milliLiter(s) Oral every 6 hours PRN  amLODIPine   Tablet 10 milliGRAM(s) Oral daily  apixaban 5 milliGRAM(s) Oral two times a day  chlorhexidine 4% Liquid 1 Application(s) Topical <User Schedule>  influenza   Vaccine 0.5 milliLiter(s) IntraMuscular once  levothyroxine 25 MICROGram(s) Oral daily  pantoprazole    Tablet 40 milliGRAM(s) Oral before breakfast  polyethylene glycol 3350 17 Gram(s) Oral daily  senna 1 Tablet(s) Oral daily  tamsulosin 0.4 milliGRAM(s) Oral at bedtime        Assessment and Plan:  83 YO M with PMH of HTN, hypothyroidism, h/o DVT, ?Paroxysmal A.Fib on Eliquis, hypercoagulable state, intellectual disability, brought in by EMS after an episode of syncope.     syncope episode due to complete heart block sp PPM placement:   sp PPM placement.  CTS signed off.    EP followed up and signed off.   as per EP, patient to wear sling to left arm to not move L arm above shoulder level as patient participates with PT to avoid leads displacement.  on 12/25/19 patient was restarted on  eliquis tx.  TTE noted: normal EF with grade 1 diastolic dysfunction.     Acute renal failure possibly postobstructive and pre-renal due to poor oral intake vs contrast induced injury:   renal function normalized.  CT abdomen/pelvis noted - mild left hydroureter   Nephrology consult appreciated.     mild left hydroureter with abdominal distension/no abdominal bowel obstruction:    distension better after manzano placement    as per urology   started on  flomax   - mnazano was d/c on 12/26, repeated US renal- no hydro.    Acute left Ankle pain and edema- pain has resolved.  degenerative OA on X ray.          h/o ?A-FIB / ?DVT with h/o hypercoagulable state:    restarted on   eliquis 5 mg bid   repeat LE doppler shows no DVT.     cholelithiasis :   stable / no cholecystitis   fu surgery as OP for elective removal of GB    leukocytosis unclear etiology:   WBC improved significantly   afebrile.      HTN:   BP stable  c/w norvasc/dced  HCTZ       nasal bone fractures:  denies pain for now  c/w tylenol prn.   ENT evaluation appreciated- no intervention.    Right 7th rib fracture  does not complaint of pain/ tylenol prn.   encourage use of incentive spirometry        PT/ physiatry eval and tx.     #Progress Note Handoff: post d/c manzano ,mild abdominal distention, f/up bedside urinary bladder, if normal , than obtain KUB abd.,   Family discussion: yes Disposition: possible STR today vs. tomorrow.

## 2019-12-28 LAB
ALBUMIN SERPL ELPH-MCNC: 3 G/DL — LOW (ref 3.5–5.2)
ALP SERPL-CCNC: 178 U/L — HIGH (ref 30–115)
ALT FLD-CCNC: 63 U/L — HIGH (ref 0–41)
ANION GAP SERPL CALC-SCNC: 14 MMOL/L — SIGNIFICANT CHANGE UP (ref 7–14)
ANION GAP SERPL CALC-SCNC: 16 MMOL/L — HIGH (ref 7–14)
AST SERPL-CCNC: 91 U/L — HIGH (ref 0–41)
BASOPHILS # BLD AUTO: 0.06 K/UL — SIGNIFICANT CHANGE UP (ref 0–0.2)
BASOPHILS # BLD AUTO: 0.07 K/UL — SIGNIFICANT CHANGE UP (ref 0–0.2)
BASOPHILS NFR BLD AUTO: 0.7 % — SIGNIFICANT CHANGE UP (ref 0–1)
BASOPHILS NFR BLD AUTO: 0.8 % — SIGNIFICANT CHANGE UP (ref 0–1)
BILIRUB SERPL-MCNC: 0.6 MG/DL — SIGNIFICANT CHANGE UP (ref 0.2–1.2)
BUN SERPL-MCNC: 28 MG/DL — HIGH (ref 10–20)
BUN SERPL-MCNC: 29 MG/DL — HIGH (ref 10–20)
CALCIUM SERPL-MCNC: 8.4 MG/DL — LOW (ref 8.5–10.1)
CALCIUM SERPL-MCNC: 8.6 MG/DL — SIGNIFICANT CHANGE UP (ref 8.5–10.1)
CHLORIDE SERPL-SCNC: 100 MMOL/L — SIGNIFICANT CHANGE UP (ref 98–110)
CHLORIDE SERPL-SCNC: 97 MMOL/L — LOW (ref 98–110)
CO2 SERPL-SCNC: 22 MMOL/L — SIGNIFICANT CHANGE UP (ref 17–32)
CO2 SERPL-SCNC: 25 MMOL/L — SIGNIFICANT CHANGE UP (ref 17–32)
CREAT SERPL-MCNC: 0.9 MG/DL — SIGNIFICANT CHANGE UP (ref 0.7–1.5)
CREAT SERPL-MCNC: 1 MG/DL — SIGNIFICANT CHANGE UP (ref 0.7–1.5)
EOSINOPHIL # BLD AUTO: 0.38 K/UL — SIGNIFICANT CHANGE UP (ref 0–0.7)
EOSINOPHIL # BLD AUTO: 0.42 K/UL — SIGNIFICANT CHANGE UP (ref 0–0.7)
EOSINOPHIL NFR BLD AUTO: 4.5 % — SIGNIFICANT CHANGE UP (ref 0–8)
EOSINOPHIL NFR BLD AUTO: 4.9 % — SIGNIFICANT CHANGE UP (ref 0–8)
GLUCOSE SERPL-MCNC: 109 MG/DL — HIGH (ref 70–99)
GLUCOSE SERPL-MCNC: 196 MG/DL — HIGH (ref 70–99)
HCT VFR BLD CALC: 35.5 % — LOW (ref 42–52)
HCT VFR BLD CALC: 35.7 % — LOW (ref 42–52)
HGB BLD-MCNC: 11.1 G/DL — LOW (ref 14–18)
HGB BLD-MCNC: 11.5 G/DL — LOW (ref 14–18)
IMM GRANULOCYTES NFR BLD AUTO: 4.2 % — HIGH (ref 0.1–0.3)
IMM GRANULOCYTES NFR BLD AUTO: 4.8 % — HIGH (ref 0.1–0.3)
LYMPHOCYTES # BLD AUTO: 0.95 K/UL — LOW (ref 1.2–3.4)
LYMPHOCYTES # BLD AUTO: 1.33 K/UL — SIGNIFICANT CHANGE UP (ref 1.2–3.4)
LYMPHOCYTES # BLD AUTO: 11.4 % — LOW (ref 20.5–51.1)
LYMPHOCYTES # BLD AUTO: 15.4 % — LOW (ref 20.5–51.1)
MAGNESIUM SERPL-MCNC: 2 MG/DL — SIGNIFICANT CHANGE UP (ref 1.8–2.4)
MCHC RBC-ENTMCNC: 27.3 PG — SIGNIFICANT CHANGE UP (ref 27–31)
MCHC RBC-ENTMCNC: 28 PG — SIGNIFICANT CHANGE UP (ref 27–31)
MCHC RBC-ENTMCNC: 31.3 G/DL — LOW (ref 32–37)
MCHC RBC-ENTMCNC: 32.2 G/DL — SIGNIFICANT CHANGE UP (ref 32–37)
MCV RBC AUTO: 87.1 FL — SIGNIFICANT CHANGE UP (ref 80–94)
MCV RBC AUTO: 87.4 FL — SIGNIFICANT CHANGE UP (ref 80–94)
MONOCYTES # BLD AUTO: 0.57 K/UL — SIGNIFICANT CHANGE UP (ref 0.1–0.6)
MONOCYTES # BLD AUTO: 0.84 K/UL — HIGH (ref 0.1–0.6)
MONOCYTES NFR BLD AUTO: 6.8 % — SIGNIFICANT CHANGE UP (ref 1.7–9.3)
MONOCYTES NFR BLD AUTO: 9.7 % — HIGH (ref 1.7–9.3)
NEUTROPHILS # BLD AUTO: 5.56 K/UL — SIGNIFICANT CHANGE UP (ref 1.4–6.5)
NEUTROPHILS # BLD AUTO: 6.05 K/UL — SIGNIFICANT CHANGE UP (ref 1.4–6.5)
NEUTROPHILS NFR BLD AUTO: 64.5 % — SIGNIFICANT CHANGE UP (ref 42.2–75.2)
NEUTROPHILS NFR BLD AUTO: 72.3 % — SIGNIFICANT CHANGE UP (ref 42.2–75.2)
NRBC # BLD: 0 /100 WBCS — SIGNIFICANT CHANGE UP (ref 0–0)
NRBC # BLD: 0 /100 WBCS — SIGNIFICANT CHANGE UP (ref 0–0)
PLATELET # BLD AUTO: 228 K/UL — SIGNIFICANT CHANGE UP (ref 130–400)
PLATELET # BLD AUTO: 306 K/UL — SIGNIFICANT CHANGE UP (ref 130–400)
POTASSIUM SERPL-MCNC: 4.3 MMOL/L — SIGNIFICANT CHANGE UP (ref 3.5–5)
POTASSIUM SERPL-MCNC: 4.8 MMOL/L — SIGNIFICANT CHANGE UP (ref 3.5–5)
POTASSIUM SERPL-SCNC: 4.3 MMOL/L — SIGNIFICANT CHANGE UP (ref 3.5–5)
POTASSIUM SERPL-SCNC: 4.8 MMOL/L — SIGNIFICANT CHANGE UP (ref 3.5–5)
PROT SERPL-MCNC: 6.3 G/DL — SIGNIFICANT CHANGE UP (ref 6–8)
RBC # BLD: 4.06 M/UL — LOW (ref 4.7–6.1)
RBC # BLD: 4.1 M/UL — LOW (ref 4.7–6.1)
RBC # FLD: 14.6 % — HIGH (ref 11.5–14.5)
RBC # FLD: 14.8 % — HIGH (ref 11.5–14.5)
SODIUM SERPL-SCNC: 136 MMOL/L — SIGNIFICANT CHANGE UP (ref 135–146)
SODIUM SERPL-SCNC: 138 MMOL/L — SIGNIFICANT CHANGE UP (ref 135–146)
WBC # BLD: 8.37 K/UL — SIGNIFICANT CHANGE UP (ref 4.8–10.8)
WBC # BLD: 8.62 K/UL — SIGNIFICANT CHANGE UP (ref 4.8–10.8)
WBC # FLD AUTO: 8.37 K/UL — SIGNIFICANT CHANGE UP (ref 4.8–10.8)
WBC # FLD AUTO: 8.62 K/UL — SIGNIFICANT CHANGE UP (ref 4.8–10.8)

## 2019-12-28 PROCEDURE — 99233 SBSQ HOSP IP/OBS HIGH 50: CPT

## 2019-12-28 RX ADMIN — APIXABAN 5 MILLIGRAM(S): 2.5 TABLET, FILM COATED ORAL at 17:11

## 2019-12-28 RX ADMIN — TAMSULOSIN HYDROCHLORIDE 0.4 MILLIGRAM(S): 0.4 CAPSULE ORAL at 22:10

## 2019-12-28 RX ADMIN — APIXABAN 5 MILLIGRAM(S): 2.5 TABLET, FILM COATED ORAL at 05:24

## 2019-12-28 RX ADMIN — CHLORHEXIDINE GLUCONATE 1 APPLICATION(S): 213 SOLUTION TOPICAL at 05:24

## 2019-12-28 RX ADMIN — Medication 25 MICROGRAM(S): at 05:24

## 2019-12-28 RX ADMIN — PANTOPRAZOLE SODIUM 40 MILLIGRAM(S): 20 TABLET, DELAYED RELEASE ORAL at 06:35

## 2019-12-28 RX ADMIN — POLYETHYLENE GLYCOL 3350 17 GRAM(S): 17 POWDER, FOR SOLUTION ORAL at 11:30

## 2019-12-28 RX ADMIN — SENNA PLUS 1 TABLET(S): 8.6 TABLET ORAL at 11:30

## 2019-12-28 RX ADMIN — AMLODIPINE BESYLATE 10 MILLIGRAM(S): 2.5 TABLET ORAL at 05:24

## 2019-12-28 NOTE — PROGRESS NOTE ADULT - SUBJECTIVE AND OBJECTIVE BOX
HI, ALFRED  [facility] T2-3A 020 B ([facility] T2-3A)            Patient was evaluated and examined  by bedside, no active complains                REVIEW OF SYSTEMS:  please see pertinent positives mentioned above, all other 12 ROS negative      T(C): , Max: 37.1 (12-27-19 @ 14:13)  HR: 61 (12-28-19 @ 04:42)  BP: 142/65 (12-28-19 @ 04:42)  RR: 18 (12-28-19 @ 04:42)  SpO2: --  CAPILLARY BLOOD GLUCOSE          PHYSICAL EXAM:  General: NAD, AAOX3, patient is laying comfortably in bed  HEENT: AT, NC, Supple, NO JVD, NO CB  Lungs: CTA B/L, no wheezing, no rhonchi  CVS: normal S1, S2, RRR, NO M/G/R  Abdomen: soft, bowel sounds present, non-tender, mildly-distended  Extremities: no edema, no clubbing, no cyanosis, positive peripheral pulses b/l  Neuro: no acute focal neurological deficits, gait not tested  Skin: no rash, no ecchymosis        LAB  CBC  Date: 12-27-19 @ 07:40  Mean cell Tzxdobhgws30.1  Mean cell Hemoglobin Conc32.4  Mean cell Volum 86.7  Platelet count-Automate 273  RBC Count 4.66  Red Cell Distrib Width14.6  WBC Count15.49  % Albumin, Urine--  Hematocrit 40.4  Hemoglobin 13.1  CBC  Date: 12-26-19 @ 06:40  Mean cell Snuqqmdmqk86.7  Mean cell Hemoglobin Conc32.5  Mean cell Volum 85.3  Platelet count-Automate 192  RBC Count 4.01  Red Cell Distrib Width14.2  WBC Count8.14  % Albumin, Urine--  Hematocrit 34.2  Hemoglobin 11.1  CBC  Date: 12-25-19 @ 06:48  Mean cell Ttehraahzm09.4  Mean cell Hemoglobin Conc33.7  Mean cell Volum 84.1  Platelet count-Automate 188  RBC Count 4.02  Red Cell Distrib Width14.1  WBC Count7.44  % Albumin, Urine--  Hematocrit 33.8  Hemoglobin 11.4  CBC  Date: 12-24-19 @ 06:05  Mean cell Qqekuehlzs92.1  Mean cell Hemoglobin Conc34.3  Mean cell Volum 81.9  Platelet count-Automate 199  RBC Count 4.31  Red Cell Distrib Width13.9  WBC Count10.00  % Albumin, Urine--  Hematocrit 35.3  Hemoglobin 12.1  CBC  Date: 12-23-19 @ 05:23  Mean cell Lxdelvcxqo59.7  Mean cell Hemoglobin Conc33.1  Mean cell Volum 83.7  Platelet count-Automate 189  RBC Count 4.73  Red Cell Distrib Width14.0  WBC Count14.94  % Albumin, Urine--  Hematocrit 39.6  Hemoglobin 13.1  CBC  Date: 12-22-19 @ 06:22  Mean cell Yzqtlmchhd25.6  Mean cell Hemoglobin Conc33.1  Mean cell Volum 83.4  Platelet count-Automate 205  RBC Count 5.00  Red Cell Distrib Width14.0  WBC Count16.63  % Albumin, Urine--  Hematocrit 41.7  Hemoglobin 13.8    [facility]  12-27-19 @ 07:40  Blood Gas Arterial-Calcium,Ionized--  Blood Urea Nitrogen, Serum 31 mg/dL<H> [10 - 20]  Carbon Dioxide, Serum21 mmol/L [17 - 32]  Chloride, Serum97 mmol/L<L> [98 - 110]  Creatinie, Serum1.2 mg/dL [0.7 - 1.5]  Glucose, Qrmtg806 mg/dL<H> [70 - 99]  Potassium, Serum5.1 mmol/L<H> [3.5 - 5.0]  Sodium, Serum 137 mmol/L [135 - 146]  [facility]  12-26-19 @ 06:40  Blood Gas Arterial-Calcium,Ionized--  Blood Urea Nitrogen, Serum 21 mg/dL<H> [10 - 20]  Carbon Dioxide, Serum26 mmol/L [17 - 32]  Chloride, Serum99 mmol/L [98 - 110]  Creatinie, Serum0.8 mg/dL [0.7 - 1.5]  Glucose, Frugh201 mg/dL<H> [70 - 99]  Potassium, Serum4.4 mmol/L [3.5 - 5.0]  Sodium, Serum 138 mmol/L [135 - 146]  [facility]  12-25-19 @ 06:48  Blood Gas Arterial-Calcium,Ionized--  Blood Urea Nitrogen, Serum 30 mg/dL<H> [10 - 20]  Carbon Dioxide, Serum24 mmol/L [17 - 32]  Chloride, Serum98 mmol/L [98 - 110]  Creatinie, Serum0.8 mg/dL [0.7 - 1.5]  Glucose, Wteeb452 mg/dL<H> [70 - 99]  Potassium, Serum3.9 mmol/L [3.5 - 5.0]  Sodium, Serum 138 mmol/L [135 - 146]  [facility]  12-24-19 @ 22:43  Blood Gas Arterial-Calcium,Ionized--  Blood Urea Nitrogen, Serum 40 mg/dL<H> [10 - 20]  Carbon Dioxide, Serum23 mmol/L [17 - 32]  Chloride, Serum99 mmol/L [98 - 110]  Creatinie, Serum1.1 mg/dL [0.7 - 1.5]  Glucose, Bjrxw924 mg/dL<H> [70 - 99]  Potassium, Serum3.9 mmol/L [3.5 - 5.0]  Sodium, Serum 137 mmol/L [135 - 146]  [facility]  12-24-19 @ 18:36  Blood Gas Arterial-Calcium,Ionized--  Blood Urea Nitrogen, Serum 42 mg/dL<H> [10 - 20]  Carbon Dioxide, Serum23 mmol/L [17 - 32]  Chloride, Serum96 mmol/L<L> [98 - 110]  Creatinie, Serum1.2 mg/dL [0.7 - 1.5]  Glucose, Spwrg748 mg/dL<H> [70 - 99]  Potassium, Serum4.3 mmol/L [3.5 - 5.0]  Sodium, Serum 135 mmol/L [135 - 146]  [facility]  12-24-19 @ 06:05  Blood Gas Arterial-Calcium,Ionized--  Blood Urea Nitrogen, Serum 64 mg/dL<HH> [10 - 20] [Critical value:]  Carbon Dioxide, Serum24 mmol/L [17 - 32]  Chloride, Serum96 mmol/L<L> [98 - 110]  Creatinie, Serum2.1 mg/dL<H> [0.7 - 1.5]  Glucose, Zuasb297 mg/dL<H> [70 - 99]  Potassium, Serum4.1 mmol/L [3.5 - 5.0]  Sodium, Serum 136 mmol/L [135 - 146]  [facility]  12-23-19 @ 22:04  Blood Gas Arterial-Calcium,Ionized--  Blood Urea Nitrogen, Serum 77 mg/dL<HH> [10 - 20] [Critical value:]  Carbon Dioxide, Serum18 mmol/L [17 - 32]  Chloride, Serum94 mmol/L<L> [98 - 110]  Creatinie, Serum3.5 mg/dL<H> [0.7 - 1.5]  Glucose, Ijkbt326 mg/dL<H> [70 - 99]  Potassium, Serum4.2 mmol/L [3.5 - 5.0]  Sodium, Serum 132 mmol/L<L> [135 - 146]              Microbiology:    Culture - Urine (collected 12-24-19 @ 09:06)  Source: .Urine Catheterized  Final Report (12-26-19 @ 06:37):    No growth        Medications:  acetaminophen   Tablet .. 650 milliGRAM(s) Oral every 6 hours PRN  acetaminophen  Suppository .. 650 milliGRAM(s) Rectal every 6 hours PRN  aluminum hydroxide/magnesium hydroxide/simethicone Suspension 30 milliLiter(s) Oral every 6 hours PRN  amLODIPine   Tablet 10 milliGRAM(s) Oral daily  apixaban 5 milliGRAM(s) Oral two times a day  chlorhexidine 4% Liquid 1 Application(s) Topical <User Schedule>  influenza   Vaccine 0.5 milliLiter(s) IntraMuscular once  levothyroxine 25 MICROGram(s) Oral daily  pantoprazole    Tablet 40 milliGRAM(s) Oral before breakfast  polyethylene glycol 3350 17 Gram(s) Oral daily  senna 1 Tablet(s) Oral daily  tamsulosin 0.4 milliGRAM(s) Oral at bedtime        Assessment and Plan:  83 YO M with PMH of HTN, hypothyroidism, h/o DVT, ?Paroxysmal A.Fib on Eliquis, hypercoagulable state, intellectual disability, brought in by EMS after an episode of syncope.     syncope episode due to complete heart block sp PPM placement:   sp PPM placement.  CTS signed off.    EP followed up and signed off.   as per EP, patient to wear sling to left arm to not move L arm above shoulder level as patient participates with PT to avoid leads displacement.  on 12/25/19 patient was restarted on  eliquis tx.  TTE noted: normal EF with grade 1 diastolic dysfunction.     Acute renal failure possibly postobstructive and pre-renal due to poor oral intake vs contrast induced injury:   renal function normalized.  CT abdomen/pelvis noted - mild left hydroureter   Nephrology consult appreciated.     Acute urinary retention- patient failed trial of void, reinserted foely catheter, continue flomax, outpatient  follow up.     mild left hydroureter with abdominal distension/no abdominal bowel obstruction:    distension better after manzano placement    as per urology   started on  flomax   - manzano was d/c on 12/26, repeated US renal- no hydro.    Acute left Ankle pain and edema- pain has resolved.  degenerative OA on X ray.          h/o ?A-FIB / ?DVT with h/o hypercoagulable state:    restarted on   eliquis 5 mg bid   repeat LE doppler shows no DVT.     cholelithiasis :   stable / no cholecystitis   fu surgery as OP for elective removal of GB    leukocytosis unclear etiology:   WBC improved significantly   afebrile.      HTN:   BP stable  c/w norvasc/dced  HCTZ       nasal bone fractures:  denies pain for now  c/w tylenol prn.   ENT evaluation appreciated- no intervention.    Right 7th rib fracture  does not complaint of pain/ tylenol prn.   encourage use of incentive spirometry        PT/ physiatry eval and tx.     #Progress Note Handoff: monitor CBC, d/c planning to STR once authorization is obtained.  Family discussion: yes Disposition: possible STR

## 2019-12-29 LAB
ALBUMIN SERPL ELPH-MCNC: 3.2 G/DL — LOW (ref 3.5–5.2)
ALP SERPL-CCNC: 167 U/L — HIGH (ref 30–115)
ALT FLD-CCNC: 49 U/L — HIGH (ref 0–41)
ANION GAP SERPL CALC-SCNC: 19 MMOL/L — HIGH (ref 7–14)
AST SERPL-CCNC: 49 U/L — HIGH (ref 0–41)
BASOPHILS # BLD AUTO: 0.05 K/UL — SIGNIFICANT CHANGE UP (ref 0–0.2)
BASOPHILS NFR BLD AUTO: 0.7 % — SIGNIFICANT CHANGE UP (ref 0–1)
BILIRUB SERPL-MCNC: 0.6 MG/DL — SIGNIFICANT CHANGE UP (ref 0.2–1.2)
BUN SERPL-MCNC: 27 MG/DL — HIGH (ref 10–20)
CALCIUM SERPL-MCNC: 8.7 MG/DL — SIGNIFICANT CHANGE UP (ref 8.5–10.1)
CHLORIDE SERPL-SCNC: 96 MMOL/L — LOW (ref 98–110)
CO2 SERPL-SCNC: 23 MMOL/L — SIGNIFICANT CHANGE UP (ref 17–32)
CREAT SERPL-MCNC: 1 MG/DL — SIGNIFICANT CHANGE UP (ref 0.7–1.5)
EOSINOPHIL # BLD AUTO: 0.31 K/UL — SIGNIFICANT CHANGE UP (ref 0–0.7)
EOSINOPHIL NFR BLD AUTO: 4.5 % — SIGNIFICANT CHANGE UP (ref 0–8)
GLUCOSE SERPL-MCNC: 141 MG/DL — HIGH (ref 70–99)
HCT VFR BLD CALC: 36.1 % — LOW (ref 42–52)
HGB BLD-MCNC: 11.5 G/DL — LOW (ref 14–18)
IMM GRANULOCYTES NFR BLD AUTO: 6.4 % — HIGH (ref 0.1–0.3)
LYMPHOCYTES # BLD AUTO: 0.89 K/UL — LOW (ref 1.2–3.4)
LYMPHOCYTES # BLD AUTO: 13 % — LOW (ref 20.5–51.1)
MAGNESIUM SERPL-MCNC: 2 MG/DL — SIGNIFICANT CHANGE UP (ref 1.8–2.4)
MCHC RBC-ENTMCNC: 28 PG — SIGNIFICANT CHANGE UP (ref 27–31)
MCHC RBC-ENTMCNC: 31.9 G/DL — LOW (ref 32–37)
MCV RBC AUTO: 87.8 FL — SIGNIFICANT CHANGE UP (ref 80–94)
MONOCYTES # BLD AUTO: 0.61 K/UL — HIGH (ref 0.1–0.6)
MONOCYTES NFR BLD AUTO: 8.9 % — SIGNIFICANT CHANGE UP (ref 1.7–9.3)
NEUTROPHILS # BLD AUTO: 4.54 K/UL — SIGNIFICANT CHANGE UP (ref 1.4–6.5)
NEUTROPHILS NFR BLD AUTO: 66.5 % — SIGNIFICANT CHANGE UP (ref 42.2–75.2)
NRBC # BLD: 0 /100 WBCS — SIGNIFICANT CHANGE UP (ref 0–0)
PLATELET # BLD AUTO: 293 K/UL — SIGNIFICANT CHANGE UP (ref 130–400)
POTASSIUM SERPL-MCNC: 4.8 MMOL/L — SIGNIFICANT CHANGE UP (ref 3.5–5)
POTASSIUM SERPL-SCNC: 4.8 MMOL/L — SIGNIFICANT CHANGE UP (ref 3.5–5)
PROT SERPL-MCNC: 6.4 G/DL — SIGNIFICANT CHANGE UP (ref 6–8)
RBC # BLD: 4.11 M/UL — LOW (ref 4.7–6.1)
RBC # FLD: 14.6 % — HIGH (ref 11.5–14.5)
SODIUM SERPL-SCNC: 138 MMOL/L — SIGNIFICANT CHANGE UP (ref 135–146)
WBC # BLD: 6.84 K/UL — SIGNIFICANT CHANGE UP (ref 4.8–10.8)
WBC # FLD AUTO: 6.84 K/UL — SIGNIFICANT CHANGE UP (ref 4.8–10.8)

## 2019-12-29 PROCEDURE — 99233 SBSQ HOSP IP/OBS HIGH 50: CPT

## 2019-12-29 RX ORDER — OXYCODONE AND ACETAMINOPHEN 5; 325 MG/1; MG/1
1 TABLET ORAL EVERY 6 HOURS
Refills: 0 | Status: DISCONTINUED | OUTPATIENT
Start: 2019-12-29 | End: 2019-12-31

## 2019-12-29 RX ORDER — LANOLIN ALCOHOL/MO/W.PET/CERES
3 CREAM (GRAM) TOPICAL ONCE
Refills: 0 | Status: COMPLETED | OUTPATIENT
Start: 2019-12-29 | End: 2019-12-29

## 2019-12-29 RX ADMIN — SENNA PLUS 1 TABLET(S): 8.6 TABLET ORAL at 11:57

## 2019-12-29 RX ADMIN — Medication 3 MILLIGRAM(S): at 01:35

## 2019-12-29 RX ADMIN — CHLORHEXIDINE GLUCONATE 1 APPLICATION(S): 213 SOLUTION TOPICAL at 05:36

## 2019-12-29 RX ADMIN — POLYETHYLENE GLYCOL 3350 17 GRAM(S): 17 POWDER, FOR SOLUTION ORAL at 11:56

## 2019-12-29 RX ADMIN — TAMSULOSIN HYDROCHLORIDE 0.4 MILLIGRAM(S): 0.4 CAPSULE ORAL at 21:28

## 2019-12-29 RX ADMIN — Medication 650 MILLIGRAM(S): at 12:02

## 2019-12-29 RX ADMIN — OXYCODONE AND ACETAMINOPHEN 1 TABLET(S): 5; 325 TABLET ORAL at 15:04

## 2019-12-29 RX ADMIN — Medication 650 MILLIGRAM(S): at 11:56

## 2019-12-29 RX ADMIN — PANTOPRAZOLE SODIUM 40 MILLIGRAM(S): 20 TABLET, DELAYED RELEASE ORAL at 05:37

## 2019-12-29 RX ADMIN — Medication 25 MICROGRAM(S): at 05:36

## 2019-12-29 RX ADMIN — OXYCODONE AND ACETAMINOPHEN 1 TABLET(S): 5; 325 TABLET ORAL at 15:02

## 2019-12-29 RX ADMIN — APIXABAN 5 MILLIGRAM(S): 2.5 TABLET, FILM COATED ORAL at 17:00

## 2019-12-29 RX ADMIN — APIXABAN 5 MILLIGRAM(S): 2.5 TABLET, FILM COATED ORAL at 05:36

## 2019-12-29 RX ADMIN — AMLODIPINE BESYLATE 10 MILLIGRAM(S): 2.5 TABLET ORAL at 05:36

## 2019-12-29 NOTE — PROGRESS NOTE ADULT - ASSESSMENT
85 YO M with PMH of HTN, hypothyroidism, h/o DVT, ?Paroxysmal A.Fib on Eliquis, hypercoagulable state, intellectual disability, brought in by EMS after an episode of syncope.     #) Left ankle pain  -xray negative for fracture  -swelling and pain improved  -c/w pain med    #) SBO vs ileus >> Resolved  - patient ileus with small bowel dilation > which resolved  - no more obstruction having regular bowel movements    #) paroxysmal afib  - c/w eliquis    #) PEDRO   >> resolved  - secondary to urinary retention  - Failed TOV, patient will be discharged with juvenal and will follow up with  as OP  -c/w flomax  -repeat u/s negative for hydronephrosis    #Syncope episode due to complete heart block  - S/p PPM placement 12/20  - Echo 12/20 LVEF 60%, Grade I Diastolic Dysfunction  - CTS following, no heavy lifting >10lbs or raising arm above shoulder level for 4-6 weeks, no submerging in water for 1 month, keep occlusive dressing intact- will be removed in cardiac surgery clinic by Dr. Zavala in 1 week  - EP following, no events on interrogation, follow up with Dr. Watson in 4-6 weeks    #HTN  - Continue Norvasc 10mg PO Daily  - Continue HCTZ 25mg PO Daily    #Afib  - on eliquis    #Nasal bone fractures  #Right 7th rib fracture  - Per ENT, no acute intervention, continue bacitracin to wound on nasal bridge  - Continue incentive spirometry  - Continue Tylenol PRN pain    #Diet: DASH/TLC  #GI Prophylaxis: Protonix 40mg PO Daily  #DVT Prophylaxis: eliquis,   #Activity: Ambulate as tolerated  #Code Status: Full Code  #) Dispo: STR placement

## 2019-12-29 NOTE — PROGRESS NOTE ADULT - SUBJECTIVE AND OBJECTIVE BOX
SUBJECTIVE:    Patient is a 84y old Male who presents with a chief complaint of Complete heart block (28 Dec 2019 11:22)    Overnight Events: Patient is stable, no acute events overnight.  VS are stable, no major complaints.    PAST MEDICAL & SURGICAL HISTORY  Hypothyroidism  Hypertension  No significant past surgical history    SOCIAL HISTORY:  Negative for smoking/alcohol/drug use.     ALLERGIES:  No Known Allergies    MEDICATIONS:  STANDING MEDICATIONS  amLODIPine   Tablet 10 milliGRAM(s) Oral daily  apixaban 5 milliGRAM(s) Oral two times a day  chlorhexidine 4% Liquid 1 Application(s) Topical <User Schedule>  influenza   Vaccine 0.5 milliLiter(s) IntraMuscular once  levothyroxine 25 MICROGram(s) Oral daily  pantoprazole    Tablet 40 milliGRAM(s) Oral before breakfast  polyethylene glycol 3350 17 Gram(s) Oral daily  senna 1 Tablet(s) Oral daily  tamsulosin 0.4 milliGRAM(s) Oral at bedtime    PRN MEDICATIONS  acetaminophen   Tablet .. 650 milliGRAM(s) Oral every 6 hours PRN  acetaminophen  Suppository .. 650 milliGRAM(s) Rectal every 6 hours PRN  aluminum hydroxide/magnesium hydroxide/simethicone Suspension 30 milliLiter(s) Oral every 6 hours PRN    VITALS:   T(F): 98.3, Max: 99.3 (12-28-19 @ 21:47)  HR: 60 (60 - 65)  BP: 129/68 (129/68 - 135/65)  RR: 17 (17 - 18)  SpO2: 94% (94% - 96%)    LABS:                        11.5   6.84  )-----------( 293      ( 29 Dec 2019 09:32 )             36.1     12-28    136  |  97<L>  |  28<H>  ----------------------------<  109<H>  4.8   |  25  |  1.0    Ca    8.4<L>      28 Dec 2019 19:07  Mg     2.0     12-28    TPro  6.3  /  Alb  3.0<L>  /  TBili  0.6  /  DBili  x   /  AST  91<H>  /  ALT  63<H>  /  AlkPhos  178<H>  12-28              Culture - Blood (collected 27 Dec 2019 19:00)  Source: .Blood None  Preliminary Report (29 Dec 2019 01:01):    No growth to date.              12-28-19 @ 07:01  -  12-29-19 @ 07:00  --------------------------------------------------------  IN: 500 mL / OUT: 1000 mL / NET: -500 mL        PHYSICAL EXAM:  GEN: NAD, comfortable  LUNGS: CTAB, no w/r/r  HEART: RRR, s1 and s2 appreciated, no m/r/g  ABD: soft, NT/ND, +BS  EXT: no edema, PP b/l  NEURO: AAOX3

## 2019-12-29 NOTE — PROGRESS NOTE ADULT - NSHPATTENDINGPLANDISCUSS_GEN_ALL_CORE
house staff
med team
CONCHA Grimm
PT FAMILY AND MED TEAM
house staff

## 2019-12-30 LAB
ALBUMIN SERPL ELPH-MCNC: 3.2 G/DL — LOW (ref 3.5–5.2)
ALP SERPL-CCNC: 156 U/L — HIGH (ref 30–115)
ALT FLD-CCNC: 41 U/L — SIGNIFICANT CHANGE UP (ref 0–41)
ANION GAP SERPL CALC-SCNC: 13 MMOL/L — SIGNIFICANT CHANGE UP (ref 7–14)
AST SERPL-CCNC: 32 U/L — SIGNIFICANT CHANGE UP (ref 0–41)
BASOPHILS # BLD AUTO: 0.08 K/UL — SIGNIFICANT CHANGE UP (ref 0–0.2)
BASOPHILS NFR BLD AUTO: 1.5 % — HIGH (ref 0–1)
BILIRUB SERPL-MCNC: 0.7 MG/DL — SIGNIFICANT CHANGE UP (ref 0.2–1.2)
BUN SERPL-MCNC: 22 MG/DL — HIGH (ref 10–20)
CALCIUM SERPL-MCNC: 8.8 MG/DL — SIGNIFICANT CHANGE UP (ref 8.5–10.1)
CHLORIDE SERPL-SCNC: 97 MMOL/L — LOW (ref 98–110)
CO2 SERPL-SCNC: 24 MMOL/L — SIGNIFICANT CHANGE UP (ref 17–32)
CREAT SERPL-MCNC: 0.9 MG/DL — SIGNIFICANT CHANGE UP (ref 0.7–1.5)
EOSINOPHIL # BLD AUTO: 0.28 K/UL — SIGNIFICANT CHANGE UP (ref 0–0.7)
EOSINOPHIL NFR BLD AUTO: 5.2 % — SIGNIFICANT CHANGE UP (ref 0–8)
GLUCOSE SERPL-MCNC: 104 MG/DL — HIGH (ref 70–99)
HCT VFR BLD CALC: 37.2 % — LOW (ref 42–52)
HGB BLD-MCNC: 11.8 G/DL — LOW (ref 14–18)
IMM GRANULOCYTES NFR BLD AUTO: 6.3 % — HIGH (ref 0.1–0.3)
LYMPHOCYTES # BLD AUTO: 0.78 K/UL — LOW (ref 1.2–3.4)
LYMPHOCYTES # BLD AUTO: 14.5 % — LOW (ref 20.5–51.1)
MAGNESIUM SERPL-MCNC: 2 MG/DL — SIGNIFICANT CHANGE UP (ref 1.8–2.4)
MCHC RBC-ENTMCNC: 27.7 PG — SIGNIFICANT CHANGE UP (ref 27–31)
MCHC RBC-ENTMCNC: 31.7 G/DL — LOW (ref 32–37)
MCV RBC AUTO: 87.3 FL — SIGNIFICANT CHANGE UP (ref 80–94)
MONOCYTES # BLD AUTO: 0.63 K/UL — HIGH (ref 0.1–0.6)
MONOCYTES NFR BLD AUTO: 11.7 % — HIGH (ref 1.7–9.3)
NEUTROPHILS # BLD AUTO: 3.26 K/UL — SIGNIFICANT CHANGE UP (ref 1.4–6.5)
NEUTROPHILS NFR BLD AUTO: 60.8 % — SIGNIFICANT CHANGE UP (ref 42.2–75.2)
NRBC # BLD: 0 /100 WBCS — SIGNIFICANT CHANGE UP (ref 0–0)
PLATELET # BLD AUTO: 279 K/UL — SIGNIFICANT CHANGE UP (ref 130–400)
POTASSIUM SERPL-MCNC: 4.8 MMOL/L — SIGNIFICANT CHANGE UP (ref 3.5–5)
POTASSIUM SERPL-SCNC: 4.8 MMOL/L — SIGNIFICANT CHANGE UP (ref 3.5–5)
PROT SERPL-MCNC: 6.8 G/DL — SIGNIFICANT CHANGE UP (ref 6–8)
RBC # BLD: 4.26 M/UL — LOW (ref 4.7–6.1)
RBC # FLD: 14.5 % — SIGNIFICANT CHANGE UP (ref 11.5–14.5)
SODIUM SERPL-SCNC: 134 MMOL/L — LOW (ref 135–146)
WBC # BLD: 5.37 K/UL — SIGNIFICANT CHANGE UP (ref 4.8–10.8)
WBC # FLD AUTO: 5.37 K/UL — SIGNIFICANT CHANGE UP (ref 4.8–10.8)

## 2019-12-30 PROCEDURE — 99233 SBSQ HOSP IP/OBS HIGH 50: CPT

## 2019-12-30 RX ADMIN — PANTOPRAZOLE SODIUM 40 MILLIGRAM(S): 20 TABLET, DELAYED RELEASE ORAL at 05:37

## 2019-12-30 RX ADMIN — POLYETHYLENE GLYCOL 3350 17 GRAM(S): 17 POWDER, FOR SOLUTION ORAL at 11:32

## 2019-12-30 RX ADMIN — AMLODIPINE BESYLATE 10 MILLIGRAM(S): 2.5 TABLET ORAL at 05:37

## 2019-12-30 RX ADMIN — APIXABAN 5 MILLIGRAM(S): 2.5 TABLET, FILM COATED ORAL at 17:52

## 2019-12-30 RX ADMIN — TAMSULOSIN HYDROCHLORIDE 0.4 MILLIGRAM(S): 0.4 CAPSULE ORAL at 21:08

## 2019-12-30 RX ADMIN — Medication 25 MICROGRAM(S): at 05:37

## 2019-12-30 RX ADMIN — CHLORHEXIDINE GLUCONATE 1 APPLICATION(S): 213 SOLUTION TOPICAL at 05:37

## 2019-12-30 RX ADMIN — APIXABAN 5 MILLIGRAM(S): 2.5 TABLET, FILM COATED ORAL at 05:37

## 2019-12-30 RX ADMIN — SENNA PLUS 1 TABLET(S): 8.6 TABLET ORAL at 11:32

## 2019-12-30 NOTE — PROGRESS NOTE ADULT - ASSESSMENT
83 YO M with PMH of HTN, hypothyroidism, h/o DVT, ?Paroxysmal A.Fib on Eliquis, hypercoagulable state, intellectual disability, brought in by EMS after an episode of syncope.     #) Left ankle pain  -xray negative for fracture  -VA duplex showed no DVTs  -swelling and pain improved  -c/w pain med    #) SBO vs ileus >> Resolved  - patient ileus with small bowel dilation > which resolved  - no more obstruction having regular bowel movements    #) paroxysmal afib  - c/w eliquis    #) PEDRO   >> resolved  - secondary to urinary retention  - Failed TOV, patient will be discharged with juvenal and will follow up with  as OP  -c/w flomax  -repeat u/s negative for hydronephrosis    #Syncope episode due to complete heart block  - S/p PPM placement 12/20  - Echo 12/20 LVEF 60%, Grade I Diastolic Dysfunction  - CTS following, no heavy lifting >10lbs or raising arm above shoulder level for 4-6 weeks, no submerging in water for 1 month, keep occlusive dressing intact- will be removed in cardiac surgery clinic by Dr. Zavala in 1 week  - EP following, no events on interrogation, follow up with Dr. Watson in 4-6 weeks    #HTN  - Continue Norvasc 10mg PO Daily  - Continue HCTZ 25mg PO Daily    #Afib  - on eliquis    #Nasal bone fractures  #Right 7th rib fracture  - Per ENT, no acute intervention, continue bacitracin to wound on nasal bridge  - Continue incentive spirometry  - Continue Tylenol PRN pain    #Diet: DASH/TLC  #GI Prophylaxis: Protonix 40mg PO Daily  #DVT Prophylaxis: eliquis,   #Activity: Ambulate as tolerated  #Code Status: Full Code  #) Dispo: STR placement

## 2019-12-30 NOTE — PROGRESS NOTE ADULT - SUBJECTIVE AND OBJECTIVE BOX
SUBJECTIVE:    Patient is a 84y old Male who presents with a chief complaint of Complete heart block (29 Dec 2019 10:44)    Overnight Events: Patient is stable, no acute events overnight.  VS are stable, no major complaints.    PAST MEDICAL & SURGICAL HISTORY  Hypothyroidism  Hypertension  No significant past surgical history    SOCIAL HISTORY:  Negative for smoking/alcohol/drug use.     ALLERGIES:  No Known Allergies    MEDICATIONS:  STANDING MEDICATIONS  amLODIPine   Tablet 10 milliGRAM(s) Oral daily  apixaban 5 milliGRAM(s) Oral two times a day  chlorhexidine 4% Liquid 1 Application(s) Topical <User Schedule>  influenza   Vaccine 0.5 milliLiter(s) IntraMuscular once  levothyroxine 25 MICROGram(s) Oral daily  pantoprazole    Tablet 40 milliGRAM(s) Oral before breakfast  polyethylene glycol 3350 17 Gram(s) Oral daily  senna 1 Tablet(s) Oral daily  tamsulosin 0.4 milliGRAM(s) Oral at bedtime    PRN MEDICATIONS  acetaminophen  Suppository .. 650 milliGRAM(s) Rectal every 6 hours PRN  aluminum hydroxide/magnesium hydroxide/simethicone Suspension 30 milliLiter(s) Oral every 6 hours PRN  oxycodone    5 mG/acetaminophen 325 mG 1 Tablet(s) Oral every 6 hours PRN    VITALS:   T(F): 96.9, Max: 97.3 (12-29-19 @ 20:31)  HR: 66 (60 - 70)  BP: 128/74 (111/45 - 137/68)  RR: 18 (17 - 18)  SpO2: 95% (95% - 95%)    LABS:                        11.8   5.37  )-----------( 279      ( 30 Dec 2019 07:41 )             37.2     12-29    138  |  96<L>  |  27<H>  ----------------------------<  141<H>  4.8   |  23  |  1.0    Ca    8.7      29 Dec 2019 09:32  Mg     2.0     12-29    TPro  6.4  /  Alb  3.2<L>  /  TBili  0.6  /  DBili  x   /  AST  49<H>  /  ALT  49<H>  /  AlkPhos  167<H>  12-29              Culture - Blood (collected 27 Dec 2019 19:00)  Source: .Blood None  Preliminary Report (29 Dec 2019 01:01):    No growth to date.              12-29-19 @ 07:01  -  12-30-19 @ 07:00  --------------------------------------------------------  IN: 700 mL / OUT: 2900 mL / NET: -2200 mL        PHYSICAL EXAM:  GEN: NAD, comfortable  LUNGS: CTAB, no w/r/r  HEART: RRR, s1 and s2 appreciated, no m/r/g  ABD: soft, NT/ND, +BS  EXT: no edema, PP b/l  NEURO: AAOX3

## 2019-12-31 ENCOUNTER — TRANSCRIPTION ENCOUNTER (OUTPATIENT)
Age: 84
End: 2019-12-31

## 2019-12-31 VITALS — OXYGEN SATURATION: 95 %

## 2019-12-31 LAB
ANION GAP SERPL CALC-SCNC: 14 MMOL/L — SIGNIFICANT CHANGE UP (ref 7–14)
ANION GAP SERPL CALC-SCNC: 15 MMOL/L — HIGH (ref 7–14)
BASOPHILS # BLD AUTO: 0.08 K/UL — SIGNIFICANT CHANGE UP (ref 0–0.2)
BASOPHILS NFR BLD AUTO: 1.1 % — HIGH (ref 0–1)
BUN SERPL-MCNC: 24 MG/DL — HIGH (ref 10–20)
BUN SERPL-MCNC: 24 MG/DL — HIGH (ref 10–20)
CALCIUM SERPL-MCNC: 9.1 MG/DL — SIGNIFICANT CHANGE UP (ref 8.5–10.1)
CALCIUM SERPL-MCNC: 9.3 MG/DL — SIGNIFICANT CHANGE UP (ref 8.5–10.1)
CHLORIDE SERPL-SCNC: 96 MMOL/L — LOW (ref 98–110)
CHLORIDE SERPL-SCNC: 98 MMOL/L — SIGNIFICANT CHANGE UP (ref 98–110)
CO2 SERPL-SCNC: 25 MMOL/L — SIGNIFICANT CHANGE UP (ref 17–32)
CO2 SERPL-SCNC: 27 MMOL/L — SIGNIFICANT CHANGE UP (ref 17–32)
CREAT SERPL-MCNC: 0.9 MG/DL — SIGNIFICANT CHANGE UP (ref 0.7–1.5)
CREAT SERPL-MCNC: 1 MG/DL — SIGNIFICANT CHANGE UP (ref 0.7–1.5)
EOSINOPHIL # BLD AUTO: 0.24 K/UL — SIGNIFICANT CHANGE UP (ref 0–0.7)
EOSINOPHIL NFR BLD AUTO: 3.4 % — SIGNIFICANT CHANGE UP (ref 0–8)
GLUCOSE SERPL-MCNC: 112 MG/DL — HIGH (ref 70–99)
GLUCOSE SERPL-MCNC: 120 MG/DL — HIGH (ref 70–99)
HCT VFR BLD CALC: 39.5 % — LOW (ref 42–52)
HGB BLD-MCNC: 12.5 G/DL — LOW (ref 14–18)
IMM GRANULOCYTES NFR BLD AUTO: 4.9 % — HIGH (ref 0.1–0.3)
LYMPHOCYTES # BLD AUTO: 1.08 K/UL — LOW (ref 1.2–3.4)
LYMPHOCYTES # BLD AUTO: 15.2 % — LOW (ref 20.5–51.1)
MCHC RBC-ENTMCNC: 27.8 PG — SIGNIFICANT CHANGE UP (ref 27–31)
MCHC RBC-ENTMCNC: 31.6 G/DL — LOW (ref 32–37)
MCV RBC AUTO: 88 FL — SIGNIFICANT CHANGE UP (ref 80–94)
MONOCYTES # BLD AUTO: 0.75 K/UL — HIGH (ref 0.1–0.6)
MONOCYTES NFR BLD AUTO: 10.5 % — HIGH (ref 1.7–9.3)
NEUTROPHILS # BLD AUTO: 4.61 K/UL — SIGNIFICANT CHANGE UP (ref 1.4–6.5)
NEUTROPHILS NFR BLD AUTO: 64.9 % — SIGNIFICANT CHANGE UP (ref 42.2–75.2)
NRBC # BLD: 0 /100 WBCS — SIGNIFICANT CHANGE UP (ref 0–0)
PLATELET # BLD AUTO: 303 K/UL — SIGNIFICANT CHANGE UP (ref 130–400)
POTASSIUM SERPL-MCNC: 4.9 MMOL/L — SIGNIFICANT CHANGE UP (ref 3.5–5)
POTASSIUM SERPL-MCNC: 5.6 MMOL/L — HIGH (ref 3.5–5)
POTASSIUM SERPL-SCNC: 4.9 MMOL/L — SIGNIFICANT CHANGE UP (ref 3.5–5)
POTASSIUM SERPL-SCNC: 5.6 MMOL/L — HIGH (ref 3.5–5)
RBC # BLD: 4.49 M/UL — LOW (ref 4.7–6.1)
RBC # FLD: 14.4 % — SIGNIFICANT CHANGE UP (ref 11.5–14.5)
SODIUM SERPL-SCNC: 136 MMOL/L — SIGNIFICANT CHANGE UP (ref 135–146)
SODIUM SERPL-SCNC: 139 MMOL/L — SIGNIFICANT CHANGE UP (ref 135–146)
WBC # BLD: 7.11 K/UL — SIGNIFICANT CHANGE UP (ref 4.8–10.8)
WBC # FLD AUTO: 7.11 K/UL — SIGNIFICANT CHANGE UP (ref 4.8–10.8)

## 2019-12-31 PROCEDURE — 99239 HOSP IP/OBS DSCHRG MGMT >30: CPT

## 2019-12-31 RX ADMIN — CHLORHEXIDINE GLUCONATE 1 APPLICATION(S): 213 SOLUTION TOPICAL at 05:53

## 2019-12-31 RX ADMIN — APIXABAN 5 MILLIGRAM(S): 2.5 TABLET, FILM COATED ORAL at 17:13

## 2019-12-31 RX ADMIN — Medication 25 MICROGRAM(S): at 05:53

## 2019-12-31 RX ADMIN — APIXABAN 5 MILLIGRAM(S): 2.5 TABLET, FILM COATED ORAL at 05:53

## 2019-12-31 RX ADMIN — SENNA PLUS 1 TABLET(S): 8.6 TABLET ORAL at 11:23

## 2019-12-31 RX ADMIN — POLYETHYLENE GLYCOL 3350 17 GRAM(S): 17 POWDER, FOR SOLUTION ORAL at 11:23

## 2019-12-31 RX ADMIN — PANTOPRAZOLE SODIUM 40 MILLIGRAM(S): 20 TABLET, DELAYED RELEASE ORAL at 05:53

## 2019-12-31 RX ADMIN — AMLODIPINE BESYLATE 10 MILLIGRAM(S): 2.5 TABLET ORAL at 05:53

## 2019-12-31 NOTE — PROGRESS NOTE ADULT - SUBJECTIVE AND OBJECTIVE BOX
SUBJECTIVE:    Patient is a 84y old Male who presents with a chief complaint of Complete heart block (30 Dec 2019 09:55)    Overnight Events: Patient is stable, no acute events overnight.  yesterday the folley was removed again and TOV was attempted as per the request of his sister.  Patient failed TOV, will be discharged on folley catheter.  VS are stable, no other complaints.    PAST MEDICAL & SURGICAL HISTORY  Hypothyroidism  Hypertension  No significant past surgical history    SOCIAL HISTORY:  Negative for smoking/alcohol/drug use.     ALLERGIES:  No Known Allergies    MEDICATIONS:  STANDING MEDICATIONS  amLODIPine   Tablet 10 milliGRAM(s) Oral daily  apixaban 5 milliGRAM(s) Oral two times a day  chlorhexidine 4% Liquid 1 Application(s) Topical <User Schedule>  influenza   Vaccine 0.5 milliLiter(s) IntraMuscular once  levothyroxine 25 MICROGram(s) Oral daily  pantoprazole    Tablet 40 milliGRAM(s) Oral before breakfast  polyethylene glycol 3350 17 Gram(s) Oral daily  senna 1 Tablet(s) Oral daily  tamsulosin 0.4 milliGRAM(s) Oral at bedtime    PRN MEDICATIONS  acetaminophen  Suppository .. 650 milliGRAM(s) Rectal every 6 hours PRN  aluminum hydroxide/magnesium hydroxide/simethicone Suspension 30 milliLiter(s) Oral every 6 hours PRN  oxycodone    5 mG/acetaminophen 325 mG 1 Tablet(s) Oral every 6 hours PRN    VITALS:   T(F): 96.4, Max: 97.3 (12-30-19 @ 12:30)  HR: 67 (62 - 72)  BP: 139/75 (128/64 - 139/75)  RR: 18 (18 - 18)  SpO2: 95% (95% - 95%)    LABS:                        12.5   7.11  )-----------( 303      ( 31 Dec 2019 07:46 )             39.5     12-31    139  |  98  |  24<H>  ----------------------------<  112<H>  5.6<H>   |  27  |  1.0    Ca    9.3      31 Dec 2019 07:46  Mg     2.0     12-30    TPro  6.8  /  Alb  3.2<L>  /  TBili  0.7  /  DBili  x   /  AST  32  /  ALT  41  /  AlkPhos  156<H>  12-30                      12-30-19 @ 07:01  -  12-31-19 @ 07:00  --------------------------------------------------------  IN: 430 mL / OUT: 1400 mL / NET: -970 mL        PHYSICAL EXAM:  GEN: NAD, comfortable  LUNGS: CTAB, no w/r/r  HEART: RRR, s1 and s2 appreciated, no m/r/g  ABD: soft, NT/ND, +BS  EXT: no edema, PP b/l  NEURO: AAOX3

## 2019-12-31 NOTE — DISCHARGE NOTE NURSING/CASE MANAGEMENT/SOCIAL WORK - PATIENT PORTAL LINK FT
You can access the FollowMyHealth Patient Portal offered by [facility] by registering at the following website: [email or web address]. By joining [facility]’s FollowMyHealth portal, you will also be able to view your health information using other applications (apps) compatible with our system.

## 2019-12-31 NOTE — CHART NOTE - NSCHARTNOTEFT_GEN_A_CORE
I discussed w/ urology per pt family request re: failed TOV x2  no further inpatient w/u  needs to f/u Dr. emerson outpatient for urodynamic study which is not done inpatient.  ok to discharge w/ manzano

## 2019-12-31 NOTE — PROGRESS NOTE ADULT - REASON FOR ADMISSION
Complete heart block

## 2019-12-31 NOTE — PROGRESS NOTE ADULT - ATTENDING COMMENTS
I saw and evaluated patient  by bedside, no active complains , tolerating diet well.    All labs, radiology studies, VS was reviewed  I have reviewed the resident's note and agree with documented findings and  plan of care.  83 YO M with PMH of HTN, hypothyroidism, h/o DVT, ?Paroxysmal A.Fib on Eliquis, hypercoagulable state, intellectual disability, brought in by EMS after an episode of syncope.     syncope episode due to complete heart block sp PPM placement:   sp PPM placement.  CTS signed off.    EP followed up and signed off.   on 12/25/19 patient was restarted on  eliquis tx.  TTE noted: normal EF with grade 1 diastolic dysfunction.     Acute renal failure possibly postobstructive and pre-renal due to poor oral intake vs contrast induced injury:   renal function normalized.  CT abdomen/pelvis noted - mild left hydroureter   Nephrology consult appreciated.     Acute urinary retention- patient failed trial of void, reinserted foely catheter, continue flomax, outpatient  follow up.     mild left hydroureter with abdominal distension/no abdominal bowel obstruction:    distension better after manzano placement    as per urology   started on  flomax   - manzano was d/c on 12/26, repeated US renal- no hydro.    Acute left Ankle pain and edema- pain has resolved.  degenerative OA on X ray.          h/o ?A-FIB / ?DVT with h/o hypercoagulable state:    restarted on   eliquis 5 mg bid   repeat LE doppler shows no DVT.     cholelithiasis :   stable / no cholecystitis   fu surgery as OP for elective removal of GB    leukocytosis unclear etiology:   WBC improved significantly   afebrile.      HTN:   BP stable  c/w norvasc/dced  HCTZ       nasal bone fractures:  denies pain for now  c/w tylenol prn.   ENT evaluation appreciated- no intervention.    Right 7th rib fracture  does not complaint of pain/ tylenol prn.   encourage use of incentive spirometry        PT/ physiatry eval and tx.     #Progress Note Handoff: patient is medically stable for STR once bed available.  Family discussion: yes Disposition: STR once bed available
pt seen and examined independently  per daughter request yesteday, TOV was attempted for the second time.    pt failed TOV and manzano placed again.    #syncope due to CHF s/p PPM placement.  ECHO ok, on eliquis.  #PEDRO - etiology unclear/multifactorial.  resolved.  #Acute urinary retention - failed TOV x2.  cont flomax.  d/c on manzano w/  f/u outpatient  #mild left hydroureter with abdominal distension/no abdominal bowel obstruction - improved after manzano  #Acute left Ankle pain and edema - degenerative changes on XR  #p. afib/dvt/hypercoagulable state - on eliquis.  duplex neg  #cholelithiasis - f/u surgery OP  #HTN - BP ok  #nasal fracture - no intervention  #Right 7th rib fracture - encourage IS  #PPx - on eliquis    medically stable for discharge to rehab today
pt seen and examined independently  wants manzano out but advised him that he failed TOV    #syncope due to CHF s/p PPM placement.  ECHO ok, on eliquis.  #PEDRO - etiology unclear/multifactorial.  resolved.  #Acute urinary retention - failed TOV.  cont flomax.  d/c on manzano w/ TOV either at [facility] or w/  outpatient.  #mild left hydroureter with abdominal distension/no abdominal bowel obstruction - improved after manzano  #Acute left Ankle pain and edema - degenerative changes on XR  #p. afib/dvt/hypercoagulable state - on eliquis.  duplex neg  #cholelithiasis - f/u surgery OP  #HTN - BP ok  #nasal fracture - no intervention  #Right 7th rib fracture - encourage IS  #PPx - on eliquis    medically stable for discharge to rehab once bed available
pt seen and examined on 12/16/19  agree with above  radiology images reviewed by me

## 2019-12-31 NOTE — PROGRESS NOTE ADULT - ASSESSMENT
85 YO M with PMH of HTN, hypothyroidism, h/o DVT, ?Paroxysmal A.Fib on Eliquis, hypercoagulable state, intellectual disability, brought in by EMS after an episode of syncope.     #) Left ankle pain  -xray negative for fracture  -VA duplex showed no DVTs  -swelling and pain improved  -c/w pain med    #) SBO vs ileus >> Resolved  - patient ileus with small bowel dilation > which resolved  - no more obstruction having regular bowel movements    #) paroxysmal afib  - c/w eliquis    #) PEDRO   >> resolved  - secondary to urinary retention  - Failed TOV on 12/26 and 12/30, patient will be discharged with juvenal and will follow up with  as OP  -c/w flomax  -repeat u/s negative for hydronephrosis    #Syncope episode due to complete heart block  - S/p PPM placement 12/20  - Echo 12/20 LVEF 60%, Grade I Diastolic Dysfunction  - CTS following, no heavy lifting >10lbs or raising arm above shoulder level for 4-6 weeks, no submerging in water for 1 month, keep occlusive dressing intact- will be removed in cardiac surgery clinic by Dr. Zavala in 1 week  - EP following, no events on interrogation, follow up with Dr. Watson in 4-6 weeks    #HTN  - Continue Norvasc 10mg PO Daily  - Continue HCTZ 25mg PO Daily    #Afib  - on eliquis    #Nasal bone fractures  #Right 7th rib fracture  - Per ENT, no acute intervention, continue bacitracin to wound on nasal bridge  - Continue incentive spirometry  - Continue Tylenol PRN pain    #Diet: DASH/TLC  #GI Prophylaxis: Protonix 40mg PO Daily  #DVT Prophylaxis: eliquis,   #Activity: Ambulate as tolerated  #Code Status: Full Code  #) Dispo: STR placement

## 2020-01-02 LAB
CULTURE RESULTS: SIGNIFICANT CHANGE UP
SPECIMEN SOURCE: SIGNIFICANT CHANGE UP

## 2020-01-07 DIAGNOSIS — D68.59 OTHER PRIMARY THROMBOPHILIA: ICD-10-CM
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
## 2020-01-07 DIAGNOSIS — K56.7 ILEUS, UNSPECIFIED: ICD-10-CM
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
## 2020-01-07 DIAGNOSIS — K56.609 UNSPECIFIED INTESTINAL OBSTRUCTION, UNSPECIFIED AS TO PARTIAL VERSUS COMPLETE OBSTRUCTION: ICD-10-CM

## 2020-01-07 DIAGNOSIS — R40.2252 COMA SCALE, BEST VERBAL RESPONSE, ORIENTED, AT ARRIVAL TO EMERGENCY DEPARTMENT: ICD-10-CM

## 2020-01-07 DIAGNOSIS — S22.31XA FRACTURE OF ONE RIB, RIGHT SIDE, INITIAL ENCOUNTER FOR CLOSED FRACTURE: ICD-10-CM

## 2020-01-07 DIAGNOSIS — K80.20 CALCULUS OF GALLBLADDER WITHOUT CHOLECYSTITIS WITHOUT OBSTRUCTION: ICD-10-CM

## 2020-01-07 DIAGNOSIS — R40.2142 COMA SCALE, EYES OPEN, SPONTANEOUS, AT ARRIVAL TO EMERGENCY DEPARTMENT: ICD-10-CM

## 2020-01-07 DIAGNOSIS — Z79.01 LONG TERM (CURRENT) USE OF ANTICOAGULANTS: ICD-10-CM

## 2020-01-07 DIAGNOSIS — M25.572 PAIN IN LEFT ANKLE AND JOINTS OF LEFT FOOT: ICD-10-CM

## 2020-01-07 DIAGNOSIS — N17.9 ACUTE KIDNEY FAILURE, UNSPECIFIED: ICD-10-CM

## 2020-01-07 DIAGNOSIS — I44.2 ATRIOVENTRICULAR BLOCK, COMPLETE: ICD-10-CM

## 2020-01-07 DIAGNOSIS — R26.89 OTHER ABNORMALITIES OF GAIT AND MOBILITY: ICD-10-CM

## 2020-01-07 DIAGNOSIS — E03.9 HYPOTHYROIDISM, UNSPECIFIED: ICD-10-CM

## 2020-01-07 DIAGNOSIS — R33.9 RETENTION OF URINE, UNSPECIFIED: ICD-10-CM

## 2020-01-07 DIAGNOSIS — R55 SYNCOPE AND COLLAPSE: ICD-10-CM

## 2020-01-07 DIAGNOSIS — I10 ESSENTIAL (PRIMARY) HYPERTENSION: ICD-10-CM

## 2020-01-07 DIAGNOSIS — F79 UNSPECIFIED INTELLECTUAL DISABILITIES: ICD-10-CM

## 2020-01-07 DIAGNOSIS — R40.2362 COMA SCALE, BEST MOTOR RESPONSE, OBEYS COMMANDS, AT ARRIVAL TO EMERGENCY DEPARTMENT: ICD-10-CM

## 2020-01-07 DIAGNOSIS — Z86.718 PERSONAL HISTORY OF OTHER VENOUS THROMBOSIS AND EMBOLISM: ICD-10-CM

## 2020-01-07 DIAGNOSIS — N13.4 HYDROURETER: ICD-10-CM

## 2020-01-07 DIAGNOSIS — S02.2XXA FRACTURE OF NASAL BONES, INITIAL ENCOUNTER FOR CLOSED FRACTURE: ICD-10-CM
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
## 2020-01-07 DIAGNOSIS — I48.0 PAROXYSMAL ATRIAL FIBRILLATION: ICD-10-CM

## 2020-01-07 DIAGNOSIS — N13.30 UNSPECIFIED HYDRONEPHROSIS: ICD-10-CM

## 2020-01-07 DIAGNOSIS — R00.1 BRADYCARDIA, UNSPECIFIED: ICD-10-CM

## 2020-01-15 PROBLEM — Z00.00 ENCOUNTER FOR PREVENTIVE HEALTH EXAMINATION: Noted: 2020-01-15

## 2020-01-16 RX ORDER — AMLODIPINE BESYLATE 2.5 MG/1
1 TABLET ORAL
Qty: 0 | Refills: 0 | DISCHARGE

## 2020-01-16 RX ORDER — LEVOTHYROXINE SODIUM 125 MCG
1 TABLET ORAL
Qty: 0 | Refills: 0 | DISCHARGE

## 2020-01-16 RX ORDER — LEVOTHYROXINE SODIUM 125 MCG
0 TABLET ORAL
Qty: 0 | Refills: 0 | DISCHARGE

## 2020-01-16 RX ORDER — APIXABAN 2.5 MG/1
1 TABLET, FILM COATED ORAL
Qty: 0 | Refills: 0 | DISCHARGE

## 2020-01-16 RX ORDER — APIXABAN 2.5 MG/1
0 TABLET, FILM COATED ORAL
Qty: 0 | Refills: 0 | DISCHARGE

## 2020-02-10 ENCOUNTER — APPOINTMENT (OUTPATIENT)
Dept: UROLOGY | Facility: CLINIC | Age: 85
End: 2020-02-10

## 2020-04-01 ENCOUNTER — APPOINTMENT (OUTPATIENT)
Dept: CARDIOLOGY | Facility: CLINIC | Age: 85
End: 2020-04-01

## 2020-08-15 NOTE — ED PROCEDURE NOTE - NS ED PROC PERFORMED BY1 FT
[facility]
Karen Ford
Karen Ford
Karen
Spontaneous, unlabored and symmetrical

## 2020-09-13 NOTE — PRE-ANESTHESIA EVALUATION ADULT - NSANTHOSAYNRD_GEN_A_CORE
Calm No. HUYEN screening performed.  STOP BANG Legend: 0-2 = LOW Risk; 3-4 = INTERMEDIATE Risk; 5-8 = HIGH Risk

## 2020-12-08 NOTE — ED ADULT NURSE NOTE - PAIN: PRESENCE, MLM
----- Message from Woo Javed MD sent at 12/8/2020  1:06 PM CST -----  Contact: [phone]  I called  ----- Message -----  From: Meryl Oliva MA  Sent: 12/7/2020   2:10 PM CST  To: MD Lianna Gurrola, he was last seen in office in 2016.  Do you want to add him on as a virtual visit somewhere?  Kim  ----- Message -----  From: Orly Burgos  Sent: 12/4/2020   8:31 AM CST  To: [facility] Staff    Type:  Needs Medical Advice    Who Called: PT  Reason for Call: pt would like to schedule a Virtual Visit with the Dr, pt has a Lump he would like to talk with the Dr About  Would the patient rather a call back or a response via [facility]?  Call back  Best Call Back Number: [phone]             non-verbal indicators present

## 2020-12-23 NOTE — ED ADULT NURSE NOTE - NSIMPLEMENTINTERV_GEN_ALL_ED
Yes
Implemented All Fall with Harm Risk Interventions:  [place] to call system. Call bell, personal items and telephone within reach. Instruct patient to call for assistance. Room bathroom lighting operational. Non-slip footwear when patient is off stretcher. Physically safe environment: no spills, clutter or unnecessary equipment. Stretcher in lowest position, wheels locked, appropriate side rails in place. Provide visual cue, wrist band, yellow gown, etc. Monitor gait and stability. Monitor for mental status changes and reorient to person, place, and time. Review medications for side effects contributing to fall risk. Reinforce activity limits and safety measures with patient and family. Provide visual clues: red socks.
Yes

## 2021-02-16 NOTE — DISCHARGE NOTE PROVIDER - NSDCCPGOAL_GEN_ALL_CORE_FT
[FreeTextEntry3] : All medical record entries made by the Scribe were at my, Dr. Hernandez's, discretion and personally dictated by me on 02/16/2021. I have reviewed the chart and agree that the record accurately reflects my personal performance of the history, physical exam, assessment and plan. I have also personally directed, reviewed and agreed to the chart.  To get better and follow your care plan as instructed.

## 2021-07-20 NOTE — DISCHARGE NOTE NURSING/CASE MANAGEMENT/SOCIAL WORK - NSDCPEELIQUIS_GEN_ALL_CORE
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 (+) steroid responder. /502, 12/20 OCT 99/88 6/7, GCC 29/81, 4/21 HVF FULL OU Plan: IOP 15/12 with thin pachs and [place] QD OD. IOP WNL. History of steroid response. Continue same medication regimen.
Apixaban/Eliquis - Follow up monitoring/Apixaban/Eliquis - Dietary Advice/Apixaban/Eliquis - Potential for adverse drug reactions and interactions/Apixaban/Eliquis - Compliance

## 2024-03-29 NOTE — PATIENT PROFILE ADULT - MONEY FOR FOOD
Pt was told by  to go to ED FOR ADMISSION BECAUSE HE NEEDS HIS TOE CUT OFF     Triage Assessment (Adult)       Row Name 03/29/24 1026          Triage Assessment    Airway WDL WDL        Respiratory WDL    Respiratory WDL WDL        Skin Circulation/Temperature WDL    Skin Circulation/Temperature WDL WDL        Cardiac WDL    Cardiac WDL WDL        Peripheral/Neurovascular WDL    Peripheral Neurovascular WDL WDL        Cognitive/Neuro/Behavioral WDL    Cognitive/Neuro/Behavioral WDL WDL                      no

## 2024-06-19 NOTE — PROCEDURAL SAFETY CHECKLIST WITH OR WITHOUT SEDATION - NSTEAMPROVIDERFT_GEN_ALL_CORE
Date of admission: 6/15/2024.  6/18 home health ordered for wound care.  Cleared for discharge: Yes - Date: 6/18 .  Discharge delayed: No.  Discharge date: tbd.   [place]
